# Patient Record
Sex: FEMALE | Race: WHITE
[De-identification: names, ages, dates, MRNs, and addresses within clinical notes are randomized per-mention and may not be internally consistent; named-entity substitution may affect disease eponyms.]

---

## 2017-10-22 ENCOUNTER — HEALTH MAINTENANCE LETTER (OUTPATIENT)
Age: 47
End: 2017-10-22

## 2018-07-16 ENCOUNTER — TRANSFERRED RECORDS (OUTPATIENT)
Dept: HEALTH INFORMATION MANAGEMENT | Facility: CLINIC | Age: 48
End: 2018-07-16

## 2018-07-26 ENCOUNTER — TRANSFERRED RECORDS (OUTPATIENT)
Dept: HEALTH INFORMATION MANAGEMENT | Facility: CLINIC | Age: 48
End: 2018-07-26

## 2018-08-29 ENCOUNTER — OFFICE VISIT (OUTPATIENT)
Dept: FAMILY MEDICINE | Facility: CLINIC | Age: 48
End: 2018-08-29
Payer: MEDICAID

## 2018-08-29 VITALS
OXYGEN SATURATION: 100 % | HEART RATE: 96 BPM | DIASTOLIC BLOOD PRESSURE: 76 MMHG | BODY MASS INDEX: 45.48 KG/M2 | SYSTOLIC BLOOD PRESSURE: 119 MMHG | WEIGHT: 281.8 LBS | TEMPERATURE: 99.1 F

## 2018-08-29 DIAGNOSIS — I10 BENIGN ESSENTIAL HYPERTENSION: ICD-10-CM

## 2018-08-29 DIAGNOSIS — F41.1 GAD (GENERALIZED ANXIETY DISORDER): ICD-10-CM

## 2018-08-29 DIAGNOSIS — F41.0 PANIC DISORDER WITHOUT AGORAPHOBIA: ICD-10-CM

## 2018-08-29 DIAGNOSIS — F15.21 METHAMPHETAMINE USE DISORDER, SEVERE, IN EARLY REMISSION, IN CONTROLLED ENVIRONMENT, DEPENDENCE (H): Primary | ICD-10-CM

## 2018-08-29 DIAGNOSIS — R30.0 DYSURIA: ICD-10-CM

## 2018-08-29 DIAGNOSIS — Z13.9 SCREENING FOR CONDITION: ICD-10-CM

## 2018-08-29 LAB
BACTERIA: NORMAL
BILIRUBIN UR: NEGATIVE
BLOOD UR: NEGATIVE
CASTS: NORMAL /LPF
CRYSTAL URINE: NORMAL /LPF
EPITHELIAL CELLS UR: NORMAL /LPF (ref 0–2)
GLUCOSE URINE: NEGATIVE
HCG UR QL: NEGATIVE
KETONES UR QL: NEGATIVE
LEUKOCYTE ESTERASE UR: ABNORMAL
MUCOUS URINE: NORMAL LPF
NITRITE UR QL STRIP: POSITIVE
PH UR STRIP: 6 [PH] (ref 5–7)
PROTEIN UR: NEGATIVE
RBC URINE: NORMAL /HPF
SP GR UR STRIP: 1.02
UROBILINOGEN UR STRIP-ACNC: ABNORMAL
WBC URINE: NORMAL /HPF

## 2018-08-29 RX ORDER — LISINOPRIL 10 MG/1
10 TABLET ORAL DAILY
Qty: 30 TABLET | Refills: 99 | Status: SHIPPED | OUTPATIENT
Start: 2018-08-29 | End: 2018-08-29

## 2018-08-29 RX ORDER — ACETAMINOPHEN 500 MG
500 TABLET ORAL
COMMUNITY
Start: 2018-07-16 | End: 2018-10-18

## 2018-08-29 RX ORDER — LISINOPRIL 10 MG/1
TABLET ORAL
Refills: 99 | COMMUNITY
Start: 2018-07-09 | End: 2018-08-29

## 2018-08-29 RX ORDER — LISINOPRIL 2.5 MG/1
2.5 TABLET ORAL DAILY
Qty: 30 TABLET | Refills: 3 | Status: SHIPPED | OUTPATIENT
Start: 2018-08-29 | End: 2018-10-18

## 2018-08-29 RX ORDER — HYDROXYZINE PAMOATE 50 MG/1
CAPSULE ORAL
Refills: 99 | COMMUNITY
Start: 2018-07-09 | End: 2018-10-18

## 2018-08-29 RX ORDER — LANCETS 28 GAUGE
EACH MISCELLANEOUS
Refills: 99 | COMMUNITY
Start: 2018-06-27 | End: 2018-10-18

## 2018-08-29 RX ORDER — ALBUTEROL SULFATE 90 UG/1
2 AEROSOL, METERED RESPIRATORY (INHALATION)
COMMUNITY
End: 2018-10-18

## 2018-08-29 RX ORDER — PROPRANOLOL HYDROCHLORIDE 10 MG/1
10 TABLET ORAL 3 TIMES DAILY
Qty: 90 TABLET | Refills: 1 | Status: SHIPPED | OUTPATIENT
Start: 2018-08-29 | End: 2018-10-18

## 2018-08-29 RX ORDER — NYSTATIN 100000 U/G
OINTMENT TOPICAL 2 TIMES DAILY
COMMUNITY

## 2018-08-29 RX ORDER — PRENATAL VIT/IRON FUM/FOLIC AC 27MG-0.8MG
1 TABLET ORAL DAILY
Qty: 100 TABLET | Refills: 3 | Status: SHIPPED | OUTPATIENT
Start: 2018-08-29 | End: 2018-10-18

## 2018-08-29 RX ORDER — IBUPROFEN 400 MG/1
400 TABLET, FILM COATED ORAL
COMMUNITY
Start: 2015-03-23 | End: 2018-10-18

## 2018-08-29 RX ORDER — MINERAL OIL/HYDROPHIL PETROLAT
OINTMENT (GRAM) TOPICAL
Refills: 0 | COMMUNITY
Start: 2018-05-22 | End: 2018-10-18

## 2018-08-29 RX ORDER — OMEGA-3 FATTY ACIDS/FISH OIL 300-1000MG
1 CAPSULE ORAL DAILY
Qty: 90 CAPSULE | Refills: 3 | Status: SHIPPED | OUTPATIENT
Start: 2018-08-29 | End: 2018-09-28

## 2018-08-29 RX ORDER — BENZOCAINE/MENTHOL 6 MG-10 MG
LOZENGE MUCOUS MEMBRANE
COMMUNITY
Start: 2016-09-27 | End: 2018-10-18

## 2018-08-29 NOTE — MR AVS SNAPSHOT
After Visit Summary   8/29/2018    Misty Girard    MRN: [id number]           Patient Information     Date Of Birth          1970        Visit Information        Provider Department      8/29/2018 4:00 PM [facility]        Today's Diagnoses     Screening for condition    -  1    Methamphetamine use disorder, severe, in early remission, in controlled environment, dependence (H)        ALONA (generalized anxiety disorder)        Panic disorder without agoraphobia        Benign essential hypertension        Dysuria          Care Instructions    Here is the plan from today's visit    1. Screening for condition  - HIV Antigen Antibody Combo  - Hepatitis A Antibody IgG  - Hepatitis A antibody IgM  - Hepatitis B Surface Antibody  - Hepatitis B surface antigen  - Hepatitis C antibody  - M Tuberculosis by Quantiferon  - HCG Qualitative Urine ([facility])  - Urine Microscopic (UA) ([facility])    2. Methamphetamine use disorder, severe, in early remission, in controlled environment, dependence (H)  - BEHAVIORAL HEALTH REFERRAL ([facility] interal and external)    3. ALONA (generalized anxiety disorder)  - propranolol (INDERAL) 10 MG tablet; Take 1 tablet (10 mg) by mouth 3 times daily AS NEEDED for anxiety  Dispense: 90 tablet; Refill: 1  - BEHAVIORAL HEALTH REFERRAL ([facility] interal and external)    4. Panic disorder without agoraphobia  - propranolol (INDERAL) 10 MG tablet; Take 1 tablet (10 mg) by mouth 3 times daily AS NEEDED for anxiety  Dispense: 90 tablet; Refill: 1  - BEHAVIORAL HEALTH REFERRAL ([facility] interal and external)    5. Benign essential hypertension  - propranolol (INDERAL) 10 MG tablet; Take 1 tablet (10 mg) by mouth 3 times daily AS NEEDED for anxiety  Dispense: 90 tablet; Refill: 1  - lisinopril (PRINIVIL/ZESTRIL) 2.5 MG tablet; Take 1 tablet (2.5 mg) by mouth daily  Dispense: 30 tablet; Refill: 3  - AUTOMATIC BP MONITOR, DIAL  - omega 3 1000 MG  CAPS; Take 1 g by mouth daily  Dispense: 90 capsule; Refill: 3  - Prenatal Vit-Fe Fumarate-FA (PRENATAL MULTIVITAMIN PLUS IRON) 27-0.8 MG TABS per tablet; Take 1 tablet by mouth daily  Dispense: 100 tablet; Refill: 3    6. Dysuria  - Urinalysis, Micro If (UA) ([facility])  - Urine Microscopic (UA) ([facility])    Please call or return to clinic if your symptoms don't go away.    Follow up plan  Follow up within one week for psych/mental health management. Follow up in the next 2 weeks for gyn concerns. Follow up later for osteoarthritis of knees.    Thank you for coming to [facility] today.  Lab Testing:  **If you had lab testing today and your results are reassuring or normal they will be mailed to you or sent through [employer] within 7 days.   **If the lab tests need quick action we will call you with the results.  The phone number we will call with results is # [phone] (home) . If this is not the best number please call our clinic and change the number.  Medication Refills:  If you need any refills please call your pharmacy and they will contact us.   If you need to  your refill at a new pharmacy, please contact the new pharmacy directly. The new pharmacy will help you get your medications transferred faster.   Scheduling:  If you have any concerns about today's visit or wish to schedule another appointment please call our office during normal business hours [phone] (8-5:00 M-F)  If a referral was made to a [facility] and you don't get a call from central scheduling please call [phone].  If a Mammogram was ordered for you at [facility] call [phone] to schedule or change your appointment.  If you had an XRay/CT/Ultrasound/MRI ordered the number is [phone] to schedule or change your radiology appointment.   Medical Concerns:  If you have urgent medical concerns please call [phone] at any time of the day.    David [facility], DO            Follow-ups  after your visit        Additional Services     BEHAVIORAL HEALTH REFERRAL ([facility] inter[facility] and external)       Referring MD: AGUEDA ARREGUIN    May leave message on voicemail: No  PHQ-9 Score:   GAD7 Score:                  Who to contact     Please call your clinic at [phone] to:    Ask questions about your health    Make or cancel appointments    Discuss your medicines    Learn about your test results    Speak to your doctor            Additional Information About Your Visit        Care EveryWhere ID     This is your Care EveryWhere ID. This could be used by other organizations to access your [facility] medical records  [id number]        Your Vitals Were     Pulse Temperature Pulse Oximetry BMI (Body Mass Index)          96 99.1  F (37.3  C) (Oral) 100% 45.48 kg/m2         Blood Pressure from Last 3 Encounters:   08/29/18 119/76   02/07/12 112/80   02/22/10 124/78    Weight from Last 3 Encounters:   08/29/18 281 lb 12.8 oz (127.8 kg)   02/07/12 262 lb 1.6 oz (118.9 kg)   02/22/10 263 lb (119.3 kg)              We Performed the Following     AUTOMATIC BP MONITOR, DIAL     BEHAVIORAL HEALTH REFERRAL ([facility] and external)     HCG Qualitative Urine ([facility])     Hepatitis A Antibody IgG     Hepatitis A antibody IgM     Hepatitis B Surface Antibody     Hepatitis B surface antigen     Hepatitis C antibody     HIV Antigen Antibody Combo     M Tuberculosis by Quantiferon     Urinalysis, Micro If (UA) ([facility])     Urine Microscopic (UA) ([facility])          Today's Medication Changes          These changes are accurate as of 8/29/18  5:18 PM.  If you have any questions, ask your nurse or doctor.               Start taking these medicines.        Dose/Directions    lisinopril 2.5 MG tablet   Commonly known as:  PRINIVIL/Zestril   Used for:  Benign essential hypertension   Started by:  Agueda Arreguin        Dose:  2.5 mg   Take 1 tablet (2.5 mg) by mouth daily   Quantity:  30 tablet    Refills:  3       omega 3 1000 MG Caps   Used for:  Benign essential hypertension   Started by:  Ag[facility]a Vera        Dose:  1 g   Take 1 g by mouth daily   Quantity:  90 capsule   Refills:  3       prenatal multivitamin plus iron 27-0.8 MG Tabs per tablet   Used for:  Benign essential hypertension   Started by:  Agueda Vera        Dose:  1 tablet   Take 1 tablet by mouth daily   Quantity:  100 tablet   Refills:  3         These medicines have changed or have updated prescriptions.        Dose/Directions    * propranolol 10 MG tablet   Commonly known as:  INDERAL   This may have changed:  Another medication with the same name was added. Make sure you understand how and when to take each.   Used for:  Generalised anxiety disorder   Changed by:  Agueda Vera        1/2 tab up to 4 times daily   Quantity:  60   Refills:  5       * propranolol 10 MG tablet   Commonly known as:  INDERAL   This may have changed:  You were already taking a medication with the same name, and this prescription was added. Make sure you understand how and when to take each.   Used for:  ALONA (generalized anxiety disorder), Panic disorder without agoraphobia, Benign essential hypertension   Changed by:  Agueda Vera        Dose:  10 mg   Take 1 tablet (10 mg) by mouth 3 times daily AS NEEDED for anxiety   Quantity:  90 tablet   Refills:  1       * Notice:  This list has 2 medication(s) that are the same as other medications prescribed for you. Read the directions carefully, and ask your doctor or other care provider to review them with you.      Stop taking these medicines if you haven't already. Please contact your care team if you have questions.     ORTHO-CYCLEN (28) 0.25-35 MG-MCG per tablet   Generic drug:  norgestimate-ethinyl estradiol   Stopped by:  Agueda Vera                Where to get your medicines      These medications were sent to [facility] [place] [facility] - [place], [place] - [address]  [address], [place] [address]     Phone:  [phone]     lisinopril 2.5 MG tablet    omega 3 1000 MG Caps    prenatal multivitamin plus iron 27-0.8 MG Tabs per tablet    propranolol 10 MG tablet                Primary Care Provider Fax #    Physician No Ref-Primary [phone]       No address on file        Equal Access to Services     MARAH MONTOYA : Hadii aad ku hadasho Soomaali, waaxda luqadaha, qaybta kaalmada adeegyada, waxay idiin hayaan adebri conner laKarengilma . So [facility] [phone].    ATENCIÓN: Si habla esptung, tiene a barbour disposición servicios gratuitos de asistencia lingüística. Jolene al [phone].    We comply with applicable federal civil rights laws and [place] laws. We do not discriminate on the basis of race, color, national origin, age, disability, sex, sexual orientation, or gender identity.            Thank you!     Thank you for choosing [facility]  for your care. Our goal is always to provide you with excellent care. Hearing back from our patients is one way we can continue to improve our services. Please take a few minutes to complete the written survey that you may receive in the mail after your visit with us. Thank you!             Your Updated Medication List - Protect others around you: Learn how to safely use, store and throw away your medicines at www.disposemymeds.org.          This list is accurate as of 8/29/18  5:18 PM.  Always use your most recent med list.                   Brand Name Dispense Instructions for use Diagnosis    acetaminophen 500 MG tablet    TYLENOL     Take 500 mg by mouth        albuterol 108 (90 Base) MCG/ACT inhaler    PROAIR HFA/PROVENTIL HFA/VENTOLIN HFA     Inhale 2 puffs into the lungs        aspirin 81 MG tablet     100    1 TABLET DAILY    Hypothyroidism       Blood Pressure Monitor Misc      As directed. Dx 401.9        BUSPAR 5 MG tablet   Generic drug:  busPIRone     60 Tab    1  tab twice daily    Generalised anxiety disorder       CONTOUR NEXT TEST test strip   Generic drug:  blood glucose monitoring      USE TO TEST BLOOD GLUCOSE FOUR TIMES DAILY;USE TO TEST BLOOD GLUCOSE AS NEEDED        DOCOSAHEXAENOIC ACID PO      Take 1,000 mg by mouth        hydrOXYzine 50 MG capsule    VISTARIL     TAKE 1 CAP BY MOUTH TWICE A DAY        IRON PO           levonorgestrel 20 MCG/24HR IUD    MIRENA     1 Device by Intrauterine route        lisinopril 2.5 MG tablet    PRINIVIL/Zestril    30 tablet    Take 1 tablet (2.5 mg) by mouth daily    Benign essential hypertension       metFORMIN 500 MG tablet    GLUCOPHAGE     TAKE 2 TABS (1,000MG) BY MOUTH TWICE A DAY WITH MEALS        mineral oil-hydrophilic petrolatum      APPLY 1 4 MED CUP TO AFFECT ED AREA(S) DAILY AS NEEDED FOR 14 DAYS        * MOTRIN 800 MG tablet   Generic drug:  ibuprofen     90 Tab    ONE TABLET 3 TIMES A DAY WITH FOOD    PMS (premenstrual syndrome)       * ibuprofen 400 MG tablet    ADVIL/MOTRIN     Take 400 mg by mouth        nystatin ointment    MYCOSTATIN     Apply topically 2 times daily        omega 3 1000 MG Caps     90 capsule    Take 1 g by mouth daily    Benign essential hypertension       prenatal multivitamin plus iron 27-0.8 MG Tabs per tablet     100 tablet    Take 1 tablet by mouth daily    Benign essential hypertension       PRENATAL VITAMIN TABS   OR     100    1 TABLET DAILY    Fatigue       * propranolol 10 MG tablet    INDERAL    60    1/2 tab up to 4 times daily    Generalised anxiety disorder       * propranolol 10 MG tablet    INDERAL    90 tablet    Take 1 tablet (10 mg) by mouth 3 times daily AS NEEDED for anxiety    ALONA (generalized anxiety disorder), Panic disorder without agoraphobia, Benign essential hypertension       PSYLLIUM PO      Take 1 tsp. by mouth        SYNTHROID 50 MCG tablet   Generic drug:  levothyroxine     90    1 TABLET DAILY    Hypothyroidism       UNISTIK 3 COMFORT Misc      USE TO TEST  BLOOD GLUCOSE FOUR TIMES DAILY;USE TO TEST BLOOD GLUCOSE AS NEEDED        VITAMIN D (CHOLECALCIFEROL) PO      Take 2,000 Units by mouth daily        * Notice:  This list has 4 medication(s) that are the same as other medications prescribed for you. Read the directions carefully, and ask your doctor or other care provider to review them with you.

## 2018-08-29 NOTE — PROGRESS NOTES
New Patient Note-Women         HPI       She is a [place] [facility] patient here to establish care. She was recently incarcerated for several months related to her long-term meth use.     Concerns today: multiple concerns, would like to transfer primary care to our clinic.   1) She has severe anxiety and would like genetic testing to determine which psych meds are right for her. Has had many panic attacks, and would like a referral to our behavioral health team.  2) needs refills on lisinopril for BP and propranolol for anxiety and HTN  3) She has concerns if she should have her Mirena IUD (5/23/17) in place if she is already menopausal.      Patient Active Problem List   Diagnosis     Generalized anxiety disorder     Secondary dysmenorrhea     Fatigue     Hypothyroidism     Elevated BP     CARDIOVASCULAR SCREENING; LDL GOAL LESS THAN 160       Past Medical History:   Diagnosis Date     Elevated BP 1/27/2010    Following on/off propranalol     Fatigue 1/27/2010    Chronic fatigue syndrome?     Generalised anxiety disorder 1/27/2010    Propranalol as needed  Starting citalopram 1/10     Hypothyroidism 1/27/2010    50 mcg - 1/10     Secondary dysmenorrhea 1/27/2010       Previous Medical Care   Established PCP with [facility]     Family History   Problem Relation Age of Onset     Diabetes Mother      C.A.D. Father      Hypertension Father               Review of Systems:     Review of Systems:  CONSTITUTIONAL: NEGATIVE for fever, chills, change in weight  INTEGUMENTARY/SKIN: NEGATIVE for worrisome rashes, moles or lesions  EYES: NEGATIVE for vision changes or irritation  ENT/MOUTH: NEGATIVE for ear, mouth and throat problems  RESP: NEGATIVE for significant cough or SOB  BREAST: NEGATIVE for masses, tenderness or discharge  CV: NEGATIVE for chest pain, palpitations or peripheral edema  GI: NEGATIVE for nausea, abdominal pain, heartburn, or change in bowel habits  : NEGATIVE for frequency, dysuria, or  hematuria  MUSCULOSKELETAL:arthralgias bilateral knees and joint swelling diagnosed bilateral Baker's cysts  NEURO: paresthesias bilateral feet in AM and with long standing  ENDOCRINE: NEGATIVE for temperature intolerance, skin/hair changes  HEME/ALLERGY: NEGATIVE for bleeding problems  PSYCHIATRIC: anxiety, HX anxiety and HX panic attacks  Sleep:   Do you snore most or the night (as reported by a family member)? Yes  Do you feel sleepy or extremely tired during most of the day? Yes  Diagnosed with SOPHIA- waiting for delivery of CPAP           Social History     Social History   Substance Use Topics     Smoking status: Never Smoker     Smokeless tobacco: Never Used     Alcohol use No       Marital Status:  Who lives in your household? Lives at [place] [facility]    Has anyone hurt you physically, for example by pushing, hitting, slapping or kicking you or forcing you to have sex? Denies  Do you feel threatened or controlled by a partner, ex-partner or anyone in your life? Denies    Sexual Health     Sexual concerns: No   STI History: Neg  Pregnancy History:   LMP No LMP recorded. Patient is not currently having periods (Reason: IUD).   Last Pap Smear Date: No results found for: PAP  Abnormal Pap History: None           Physical Exam:     Vitals: /76  Pulse 96  Temp 99.1  F (37.3  C) (Oral)  Wt 281 lb 12.8 oz (127.8 kg)  SpO2 100%  BMI 45.48 kg/m2  BMI= Body mass index is 45.48 kg/(m^2).     GENERAL: healthy, alert and obese  NECK: no tenderness, no adenopathy, no asymmetry, no masses, no stiffness; thyroid- normal to palpation  RESP: lungs clear to auscultation - no rales, no rhonchi, no wheezes  CV: regular rates and rhythm, normal S1 S2, no S3 or S4 and no murmur, no click or rub -  ABDOMEN: soft, no tenderness, no  hepatosplenomegaly, no masses, normal bowel sounds. Midline ventral hernia present, reducible, nontender.  MS: extremities- no gross deformities noted, no edema  SKIN: no  suspicious lesions, no rashes  NEURO: strength and tone- normal, sensory exam- grossly normal, mentation- intact, speech- normal, reflexes- symmetric  PSYCH: anxious mood and affect, rocking back and forth in her chair, speech pressured at times, redirectable    Assessment and Plan      Misty was seen today for physical.    Diagnoses and all orders for this visit:    Screening for condition  -     HIV Antigen Antibody Combo  -     Hepatitis A Antibody IgG  -     Hepatitis A antibody IgM  -     Hepatitis B Surface Antibody  -     Hepatitis B surface antigen  -     Hepatitis C antibody  -     M Tuberculosis by Quantiferon  -     HCG Qualitative Urine ([facility])  -     Urine Microscopic (UA) ([facility])    Methamphetamine use disorder, severe, in early remission, in controlled environment, dependence (H)  -     [facility]    ALONA (generalized anxiety disorder)  -     propranolol (INDERAL) 10 MG tablet; Take 1 tablet (10 mg) by mouth 3 times daily AS NEEDED for anxiety  -     [facility]    Panic disorder without agoraphobia  -     propranolol (INDERAL) 10 MG tablet; Take 1 tablet (10 mg) by mouth 3 times daily AS NEEDED for anxiety  -     BEHAVIORAL HEALTH REFERRAL ([facility] interal and external)    Benign essential hypertension  -     propranolol (INDERAL) 10 MG tablet; Take 1 tablet (10 mg) by mouth 3 times daily AS NEEDED for anxiety  -     Discontinue: lisinopril (PRINIVIL/ZESTRIL) 10 MG tablet; Take 1 tablet (10 mg) by mouth daily  -     lisinopril (PRINIVIL/ZESTRIL) 2.5 MG tablet; Take 1 tablet (2.5 mg) by mouth daily  -     AUTOMATIC BP MONITOR, DIAL  -     omega 3 1000 MG CAPS; Take 1 g by mouth daily  -     Prenatal Vit-Fe Fumarate-FA (PRENATAL MULTIVITAMIN PLUS IRON) 27-0.8 MG TABS per tablet; Take 1 tablet by mouth daily    Dysuria  -     Urinalysis, Micro If (UA) ([facility])  -     Urine Microscopic (UA)  ([facility])    Misty was advised to follow up at our clinic for her mental health concerns, and for her gynecologic concerns, and for her knee pain.    Options for treatment and follow-up care were reviewed with the patient . Misty Girard  engaged in the decision making process and verbalized understanding of the options discussed and agreed with the final plan.    David Vera, DO

## 2018-08-29 NOTE — PATIENT INSTRUCTIONS
Here is the plan from today's visit    1. Screening for condition  - HIV Antigen Antibody Combo  - Hepatitis A Antibody IgG  - Hepatitis A antibody IgM  - Hepatitis B Surface Antibody  - Hepatitis B surface antigen  - Hepatitis C antibody  - M Tuberculosis by Quantiferon  - HCG Qualitative Urine ([facility])  - Urine Microscopic (UA) ([facility])    2. Methamphetamine use disorder, severe, in early remission, in controlled environment, dependence (H)  - BEHAVIORAL HEALTH REFERRAL ([facility] interal and external)    3. ALONA (generalized anxiety disorder)  - propranolol (INDERAL) 10 MG tablet; Take 1 tablet (10 mg) by mouth 3 times daily AS NEEDED for anxiety  Dispense: 90 tablet; Refill: 1  - BEHAVIORAL HEALTH REFERRAL ([facility] interal and external)    4. Panic disorder without agoraphobia  - propranolol (INDERAL) 10 MG tablet; Take 1 tablet (10 mg) by mouth 3 times daily AS NEEDED for anxiety  Dispense: 90 tablet; Refill: 1  - BEHAVIORAL HEALTH REFERRAL ([facility] interal and external)    5. Benign essential hypertension  - propranolol (INDERAL) 10 MG tablet; Take 1 tablet (10 mg) by mouth 3 times daily AS NEEDED for anxiety  Dispense: 90 tablet; Refill: 1  - lisinopril (PRINIVIL/ZESTRIL) 2.5 MG tablet; Take 1 tablet (2.5 mg) by mouth daily  Dispense: 30 tablet; Refill: 3  - AUTOMATIC BP MONITOR, DIAL  - omega 3 1000 MG CAPS; Take 1 g by mouth daily  Dispense: 90 capsule; Refill: 3  - Prenatal Vit-Fe Fumarate-FA (PRENATAL MULTIVITAMIN PLUS IRON) 27-0.8 MG TABS per tablet; Take 1 tablet by mouth daily  Dispense: 100 tablet; Refill: 3    6. Dysuria  - Urinalysis, Micro If (UA) ([facility])  - Urine Microscopic (UA) ([facility])    Please call or return to clinic if your symptoms don't go away.    Follow up plan  Follow up within one week for psych/mental health management. Follow up in the next 2 weeks for gyn concerns. Follow up later for osteoarthritis of knees.    Thank you for coming to [facility] today.  Lab  Testing:  **If you had lab testing today and your results are reassuring or normal they will be mailed to you or sent through [employer] within 7 days.   **If the lab tests need quick action we will call you with the results.  The phone number we will call with results is # [phone] (home) . If this is not the best number please call our clinic and change the number.  Medication Refills:  If you need any refills please call your pharmacy and they will contact us.   If you need to  your refill at a new pharmacy, please contact the new pharmacy directly. The new pharmacy will help you get your medications transferred faster.   Scheduling:  If you have any concerns about today's visit or wish to schedule another appointment please call our office during normal business hours [phone] (8-5:00 M-F)  If a referral was made to a [facility] and you don't get a call from central scheduling please call [phone].  If a Mammogram was ordered for you at [facility] call [phone] to schedule or change your appointment.  If you had an XRay/CT/Ultrasound/MRI ordered the number is [phone] to schedule or change your radiology appointment.   Medical Concerns:  If you have urgent medical concerns please call [phone] at any time of the day.    David Vera,

## 2018-08-29 NOTE — PROGRESS NOTES
Preceptor Attestation:   Patient seen, evaluated and discussed with the resident.   I have verified the content of the note, which accurately reflects my assessment of the patient and the plan of care.   Supervising Physician:  Jaylen Shahid MD

## 2018-08-30 ENCOUNTER — TELEPHONE (OUTPATIENT)
Dept: FAMILY MEDICINE | Facility: CLINIC | Age: 48
End: 2018-08-30

## 2018-08-30 LAB
HAV IGG SER QL IA: NONREACTIVE
HAV IGM SERPL QL IA: NONREACTIVE
HBV SURFACE AB SERPL IA-ACNC: 0.97 M[IU]/ML
HBV SURFACE AG SERPL QL IA: NONREACTIVE
HCV AB SERPL QL IA: NONREACTIVE
HIV 1+2 AB+HIV1 P24 AG SERPL QL IA: NONREACTIVE

## 2018-08-30 NOTE — TELEPHONE ENCOUNTER
[facility] phone call message- medication clarification/question:    Full Medication Name: lisinopril (PRINIVIL/ZESTRIL) 2.5 MG tablet   Dose: Take 1 tablet (2.5 mg) by mouth daily - Oral    Question/Clarification needed: Patient's pharmacy called stating that they received two different scripts for this medication. Author did inform pharmacy that this is the only prescription for this specific medication on file. Pharmacy would like call back to clarify.     Pharmacy confirmed as   [facility] [place] [facility] - [place], [place] - [address]  [address]  [place] [address]  Phone: [phone] Fax: [phone]  : Yes    Please leave ONLY preferred pharmacy    OK to leave a message on voice mail? Yes    Advised patient that RN would call back within 3 hours, unless emergent.    Primary language: English      needed? No    Call taken on August 30, 2018 at 9:38 AM by Ailyn Hamilton    Route to [facility]

## 2018-08-30 NOTE — TELEPHONE ENCOUNTER
RN called pharmacy to relay that lisinopril 10mg dose was discontinued and ordered the 2.5mg dose. pharmacy verbalized understanding    Jesica Byers RN

## 2018-08-30 NOTE — TELEPHONE ENCOUNTER
Dr. Shahid per [facility] the OMEGA 3 is not covered by insurance, please send over something else or let us know if you would like a PA started?    Prior Authorization Retail Medication Request    Medication/Dose: OMEGA-3-ACID 1GM CAP  ICD code (if different than what is on RX):  Benign essential hypertension [I10]   Previously Tried and Failed:  See chart  Rationale:  See chart    Insurance Name:  [employer]  Insurance ID:  [id number]      Pharmacy Information (if different than what is on RX)  Name:  [facility]  Phone:  [phone]    ARLET Burnett 4:05 PM August 30, 2018

## 2018-08-31 ENCOUNTER — TELEPHONE (OUTPATIENT)
Dept: FAMILY MEDICINE | Facility: CLINIC | Age: 48
End: 2018-08-31

## 2018-08-31 LAB
GAMMA INTERFERON BACKGROUND BLD IA-ACNC: 0.02 IU/ML
M TB IFN-G BLD-IMP: NEGATIVE
M TB IFN-G CD4+ BCKGRND COR BLD-ACNC: 8.57 IU/ML
MITOGEN IGNF BCKGRD COR BLD-ACNC: 0.01 IU/ML
MITOGEN IGNF BCKGRD COR BLD-ACNC: 0.02 IU/ML

## 2018-08-31 NOTE — TELEPHONE ENCOUNTER
RN returned call. States she figured it out. Confirmed directions one tablet three times daily PRN. No further questions at this time.    Mary Holt RN

## 2018-08-31 NOTE — TELEPHONE ENCOUNTER
[facility] phone call message- medication clarification/question:    Full Medication Name: propranolol (INDERAL) 10 MG tablet       Question/Clarification needed: Karmen from [facility] called to get clarification on the medication above. She states she got two different way of medication prescription and need some clarification. Please give her call to discuss.      Pharmacy confirmed as   [facility] [place] [facility] - [place], [place] - [address]  [address]  [place] [address]  Phone: [phone] Fax: [phone]  : Yes    Please leave ONLY preferred pharmacy    OK to leave a message on voice mail? Yes    Advised patient that RN would call back within 3 hours, unless emergent.    Primary language: English      needed? No    Call taken on August 31, 2018 at 1:30 PM by Roseline Randolph    Route to [facility] TRIAGE

## 2018-08-31 NOTE — TELEPHONE ENCOUNTER
Private insurance will likely not cover fish oil, but as a [place] [facility] patient, she will need an Rx for all meds. Please check with faculty about how to proceed.  David Vera, DO

## 2018-09-05 ENCOUNTER — TELEPHONE (OUTPATIENT)
Dept: FAMILY MEDICINE | Facility: CLINIC | Age: 48
End: 2018-09-05

## 2018-09-05 NOTE — TELEPHONE ENCOUNTER
Left message for Norman with [facility], stated that we are calling in regards to the fish oil script for patient as it looks like the insurance will not cover it with a script, need to know how to go about it to get her the fish oil while she is in [facility].     Cheri Farias, TIFFA 1:42 PM September 5, 2018

## 2018-09-05 NOTE — TELEPHONE ENCOUNTER
Sent message to Dr. Vera for clarification.  Will update when I receive a reply.    Hi Dr. Vera - Just needed some clarification - the referral looks like it is for therapy but in your note it states the patient wants genetic testing and input on her medications, so that seems like that would be a psychiatry referral.  The other piece I am wondering about is if she is still an [facility] patient.  If this is the case, then her referrals for psychiatry and  are managed through the [facility] program.  Your note stated she was switching her primary care here, so I wasn't sure if she was finished with the program and seeing us here on her own now or what the situation is currently?    Thank you!  Kaur

## 2018-09-06 ENCOUNTER — TELEPHONE (OUTPATIENT)
Dept: FAMILY MEDICINE | Facility: CLINIC | Age: 48
End: 2018-09-06

## 2018-09-06 NOTE — TELEPHONE ENCOUNTER
[facility] phone call message- general phone call:    Reason for call: Nurse from [place] [facility] called back to inform MA that Pharmacy has filled the prescription and patient will pay cash if she chooses to take.    Return call needed: No    OK to leave a message on voice mail? No    Advised patient response may take up to 2 business days: No    Primary language: English      needed? No    Call taken on September 6, 2018 at 11:41 AM by Kary Cesar to [facility] TRIAGE

## 2018-09-07 ENCOUNTER — TELEPHONE (OUTPATIENT)
Dept: FAMILY MEDICINE | Facility: CLINIC | Age: 48
End: 2018-09-07

## 2018-09-08 NOTE — TELEPHONE ENCOUNTER
Psychiatry Consult Referral:  Please schedule this patient with Dr. Blanton.  This is for a one time consult only, not for ongoing psychiatric care.  Dr. Blanton provides recommendations to the PCP for ongoing care.     Please let the patient know that this is an educational consult clinic.  For that reason, Dr. Blanton and a resident will be seeing the patient together.  If the patient is not comfortable with that we can assist with making arrangements for a psychiatry consult at an outside clinic.    If you are unable to reach the patient after two phone calls, please send a letter and close this encounter.    Thank you!

## 2018-09-11 ENCOUNTER — TELEPHONE (OUTPATIENT)
Dept: FAMILY MEDICINE | Facility: CLINIC | Age: 48
End: 2018-09-11

## 2018-09-11 DIAGNOSIS — M17.0 PRIMARY OSTEOARTHRITIS OF BOTH KNEES: Primary | ICD-10-CM

## 2018-09-11 NOTE — TELEPHONE ENCOUNTER
Central Prior Authorization Team   Phone: [phone]      PA Initiation    Medication[facility]- [facility]  Insurance Company: [place] Medicaid ([facility]) - Phone [phone] Fax [phone]  Pharmacy Filling the Rx: [facility] [place] [facility] - [place], [place] - [address] [place] [address], [address]  Filling Pharmacy Phone: [phone]  Filling Pharmacy Fax: [phone]  Start Date: 9/11/2018

## 2018-09-12 NOTE — TELEPHONE ENCOUNTER
PRIOR AUTHORIZATION DENIED    Medication: Fish Oil- DENIED    Denial Date: 9/11/2018    Denial Rational:           Appeal Information:

## 2018-09-13 ENCOUNTER — TELEPHONE (OUTPATIENT)
Dept: FAMILY MEDICINE | Facility: CLINIC | Age: 48
End: 2018-09-13

## 2018-09-13 NOTE — TELEPHONE ENCOUNTER
Prior Authorization Retail Medication Request  Medication/Dose: Fiberlax Tabs 625mg 1x a day PRN           [facility] [place] [address] - [place] - [address], [address] [phone] (Phone)  [phone] (Fax)     Patient indicates to reference NDC [id number] when submitting to insurance and then they will cover it she takes the medication 1x a day PRN.

## 2018-09-13 NOTE — TELEPHONE ENCOUNTER
[facility] phone call message - order or referral request from patient:     Order or referral being requested: Requested order for bilateral knee sleeve supports  Additional Details: please send to [facility] medical    Referral only -Specialty knee supports location [facility] fax # is [phone]    OK to leave a message on voice mail? Yes    Primary language: English      needed? No    Call taken on September 13, 2018 at 4:43 PM by Verona Vargas    Order request route to [facility] TRIAGE   Referrals Route to [facility] ([facility]) CARE COORDINATOR

## 2018-09-13 NOTE — TELEPHONE ENCOUNTER
Prior Authorization:     Denied Reason: see below    Alternatives: None available    Pharmacy notified.  Routing to MD    Please advise if you would like to move forward with the appeal process or plan to  prescribe an alternative medication. If Appeal is desired a letter of medical necessity with denial rationale is needed to start the appeal process.   Route to PA Pool when completed.    ARLET Burnett 8:35 AM September 13, 2018

## 2018-09-13 NOTE — TELEPHONE ENCOUNTER
LVM with name and callback number on [place] [facility] VM for Luiz.       Comfort Sneed, CMA

## 2018-09-13 NOTE — TELEPHONE ENCOUNTER
Spoke with Norman at [facility] who transferred me as pt is now in a different program, left voicemail for the nurse Mikaela to give us a call back. Need to verify message below.     ARLET Burnett 8:14 AM September 13, 2018

## 2018-09-13 NOTE — TELEPHONE ENCOUNTER
The patient called back about this PA.  The PA needs to state specific medication of Omega 3 fatty acid fish oil 340/1000mg she takes this 2x a day and NDC # to reference per pt is [id number]

## 2018-09-14 NOTE — TELEPHONE ENCOUNTER
Called pharmacy to have them reprocess with NDC below and they stated that is the one they were requesting PA for. Call [place] Medicaid to find out why medication was denied if NDC should be covered. Per rep, letter states patient must be seen at the 3 locations described below in order for Fish Oil to be covered. Appeal will need to be done at this point if MD feels medication should be covered regardless of location.

## 2018-09-14 NOTE — TELEPHONE ENCOUNTER
Message routed to Dr. Vera to place order if appropriate. Saw patient in clinic on 8/29/18.     If placed, please give to your forms PCS to fax.    Mary Holt RN

## 2018-09-14 NOTE — TELEPHONE ENCOUNTER
Spoke with Mikaela nursing staff at facility pt is at and verified that yes the pt needs the prescription sent in if it isn't already, advised we sent it over but got it back saying insurance doesn't cover it. At this point Mikaela will contact the pharmacy and advise them to give her the out of pocket price for pt and inform patient of it, at which pt can decide if she will pay for it or not and start it. Mikaela will call if any further questions.   ARLET Burnett 9:19 AM September 14, 2018

## 2018-09-17 NOTE — TELEPHONE ENCOUNTER
Central Prior Authorization Team   Phone: [phone]      PA Initiation    Medication: omega 3 1000 MG CAPS-PA initiated  Insurance Company: [place] Medicaid ([facility]) - Phone [phone] Fax [phone]  Pharmacy Filling the Rx: [facility] [place] [address] - [place], [place] - [address] [place] [address], [address]  Filling Pharmacy Phone: [phone]  Filling Pharmacy Fax: [phone]  Start Date: 9/17/2018

## 2018-09-18 NOTE — TELEPHONE ENCOUNTER
Patient called stating that the insurance company would like us to call to update information so the prior authorization could be processed. Please call MA at [phone].    Ailyn Hamilton, Patient Representative

## 2018-09-18 NOTE — TELEPHONE ENCOUNTER
Spoke to Janet at [place] [facility] and relayed information giving to me regarding PA. I called the phone # provided and kept getting back the the [facility] PA Dept at which they stated, there is nothing to update. The only thing left to do is an appeal. Janet will let patient know and discuss with clinic if any questions.

## 2018-09-20 NOTE — TELEPHONE ENCOUNTER
Prior Authorization:     Denied Reason:     Alternatives: None available    Pharmacy notified.  Routing to MD    Please advise if you would like to move forward with the appeal process or plan to  prescribe an alternative medication. If Appeal is desired a letter of medical necessity with denial rationale is needed to start the appeal process.   Route to PA Pool when completed.

## 2018-09-20 NOTE — TELEPHONE ENCOUNTER
Have placed order to [facility] for 2 elastic knee braces. Will consult with preceptor for how to proceed with PA for fish oil.  David Vera, DO

## 2018-09-21 ENCOUNTER — OFFICE VISIT (OUTPATIENT)
Dept: FAMILY MEDICINE | Facility: CLINIC | Age: 48
End: 2018-09-21
Payer: MEDICAID

## 2018-09-21 VITALS
OXYGEN SATURATION: 97 % | BODY MASS INDEX: 45 KG/M2 | WEIGHT: 278.8 LBS | SYSTOLIC BLOOD PRESSURE: 124 MMHG | DIASTOLIC BLOOD PRESSURE: 81 MMHG | HEART RATE: 94 BPM | RESPIRATION RATE: 16 BRPM

## 2018-09-21 DIAGNOSIS — I10 BENIGN ESSENTIAL HYPERTENSION: ICD-10-CM

## 2018-09-21 DIAGNOSIS — M17.0 OSTEOARTHRITIS OF BOTH KNEES, UNSPECIFIED OSTEOARTHRITIS TYPE: ICD-10-CM

## 2018-09-21 DIAGNOSIS — K59.00 CONSTIPATION, UNSPECIFIED CONSTIPATION TYPE: ICD-10-CM

## 2018-09-21 DIAGNOSIS — F15.10 METHAMPHETAMINE USE (H): ICD-10-CM

## 2018-09-21 DIAGNOSIS — Z00.00 ROUTINE GENERAL MEDICAL EXAMINATION AT A HEALTH CARE FACILITY: Primary | ICD-10-CM

## 2018-09-21 DIAGNOSIS — E11.40 TYPE 2 DIABETES MELLITUS WITH DIABETIC NEUROPATHY, WITHOUT LONG-TERM CURRENT USE OF INSULIN (H): ICD-10-CM

## 2018-09-21 DIAGNOSIS — F41.9 ANXIETY: ICD-10-CM

## 2018-09-21 LAB
CHOLEST SERPL-MCNC: 202.2 MG/DL (ref 0–200)
CHOLEST/HDLC SERPL: 6.1 {RATIO} (ref 0–5)
HBA1C MFR BLD: 6.7 % (ref 4.1–5.7)
HDLC SERPL-MCNC: 33.4 MG/DL
LDLC SERPL CALC-MCNC: 119 MG/DL (ref 0–129)
TRIGL SERPL-MCNC: 250.9 MG/DL (ref 0–150)
TSH SERPL DL<=0.005 MIU/L-ACNC: 2.47 MU/L (ref 0.4–4)
VLDL CHOLESTEROL: 50.2 MG/DL (ref 7–32)

## 2018-09-21 RX ORDER — CALCIUM POLYCARBOPHIL 625 MG 625 MG/1
2 TABLET ORAL DAILY
Qty: 30 TABLET | Refills: 3 | Status: SHIPPED | OUTPATIENT
Start: 2018-09-21 | End: 2018-10-18

## 2018-09-21 ASSESSMENT — ENCOUNTER SYMPTOMS
EYES NEGATIVE: 1
APPETITE CHANGE: 0
LIGHT-HEADEDNESS: 0
DYSPHORIC MOOD: 0
DIFFICULTY URINATING: 0
AGITATION: 0
PALPITATIONS: 0
WEAKNESS: 0
ACTIVITY CHANGE: 0
NUMBNESS: 1
CONSTIPATION: 0
ABDOMINAL DISTENTION: 0
HEADACHES: 0
COUGH: 0
ARTHRALGIAS: 1
DYSURIA: 0
DIARRHEA: 0
FATIGUE: 0
SLEEP DISTURBANCE: 0
FEVER: 0
UNEXPECTED WEIGHT CHANGE: 0
ABDOMINAL PAIN: 0
SHORTNESS OF BREATH: 0
NERVOUS/ANXIOUS: 1

## 2018-09-21 NOTE — PROGRESS NOTES
Preceptor Attestation:   Patient seen and discussed with the resident. Assessment and plan reviewed with resident and agreed upon.   Supervising Physician:  Deshaun Martin MD  [place]

## 2018-09-21 NOTE — LETTER
"[facility]  [address],  [address]  [facility] [address]  [phone]        October 6, 2018     Misty Girard  [address] [place] [id number]        Dear Misty:    Here are your results from your last hemoglobin A1c: 6.7, this is improved from your last A1c which you reported was 8. Continue your current diabetic regimen.     Here are the results of your latest lipid tests:  LDL Cholesterol Calculated   Date Value Ref Range Status   09/21/2018 119 0 - 129 mg/dL Final     HDL Cholesterol   Date Value Ref Range Status   09/21/2018 33.4 (L) >40.0 mg/dL Final     Triglycerides   Date Value Ref Range Status   09/21/2018 250.9 (H) 0.0 - 150.0 mg/dL Final     Cholesterol   Date Value Ref Range Status   09/21/2018 202.2 (H) 0.0 - 200.0 mg/dL Final       LDL is the \"bad\" cholesterol linked to heart disease and stroke.   HDL is the \"good\" choesterol and when it is high, it decreases the risk for above problems.    Follow a low-fat, low-cholesterol diet and get regular exercise, this will help bring your triglyceride level down.  Please feel free to call the clinic if you have any questions.    Sincerely,  Ailyn Posadas MD    "

## 2018-09-21 NOTE — MR AVS SNAPSHOT
After Visit Summary   9/21/2018    Misty Girard    MRN: [id number]           Patient Information     Date Of Birth          1970        Visit Information        Provider Department      9/21/2018 2:00 PM Lainey Posadas MD [facility]        Today's Diagnoses     Routine general medical examination at a health care facility    -  1    Constipation, unspecified constipation type        Anxiety        Osteoarthritis of both knees, unspecified osteoarthritis type        Type 2 diabetes mellitus with diabetic neuropathy, without long-term current use of insulin (H)          Care Instructions    Here is the plan from today's visit    1. Constipation, unspecified constipation type  - calcium polycarbophil (FIBERCON) 625 MG tablet; Take 2 tablets (1,250 mg) by mouth daily  Dispense: 30 tablet; Refill: 3    2. Routine general medical examination at a health care facility  - Lipid [place] ([facility])  - TSH with free T4 reflex  - Vitamin D Level  - Hemoglobin A1c ([facility])    3. Anxiety  Keep your appointment with Dr. Blanton    4. Osteoarthritis of both knees, unspecified osteoarthritis type  If your pain worsens, come back in.    5. Type 2 diabetes mellitus with diabetic neuropathy, without long-term current use of insulin (H)  -A1c  - metformin 1000 twice a day  - follow-up with me in 1 week      Please call or return to clinic if your symptoms don't go away.    Follow up plan  Please make a clinic appointment for follow up with me (LAINEY POSADAS) in 1  week for DM follow-up.    Thank you for coming to [facility] today.  Lab Testing:  **If you had lab testing today and your results are reassuring or normal they will be mailed to you or sent through [facility] within 7 days.   **If the lab tests need quick action we will call you with the results.  The phone number we will call with results is # [phone] (home) . If this is not the best number please call our clinic and change  the number.  Medication Refills:  If you need any refills please call your pharmacy and they will contact us.   If you need to  your refill at a new pharmacy, please contact the new pharmacy directly. The new pharmacy will help you get your medications transferred faster.   Scheduling:  If you have any concerns about today's visit or wish to schedule another appointment please call our office during normal business hours [phone] (8-5:00 M-F)  If a referral was made to a [facility] and you don't get a call from central scheduling please call [phone].  If a Mammogram was ordered for you at [facility] call [phone] to schedule or change your appointment.  If you had an XRay/CT/Ultrasound/MRI ordered the number is [phone] to schedule or change your radiology appointment.   Medical Concerns:  If you have urgent medical concerns please call [phone] at any time of the day.    Tiffany Posadas MD           Follow-ups after your visit        Your next 10 appointments already scheduled     Sep 24, 2018  2:00 PM CDT   Return Visit with Agueda Vera   [facility] ([facility])    [address],  [facility] [address]  [place] [id number] [phone]            Oct 04, 2018  3:00 PM CDT   CONSULT with Helen Blanton MD   [facility] ([facility])    [address],  [facility] [address]  [place] [address]   [phone]              Who to contact     Please call your clinic at [phone] to:    Ask questions about your health    Make or cancel appointments    Discuss your medicines    Learn about your test results    Speak to your doctor            Additional Information About Your Visit        Care EveryWhere ID     This is your Care EveryWhere ID. This could be used by other organizations to access your [place] medical records  [id number]        Your Vitals Were     Pulse Respirations Pulse  Oximetry BMI (Body Mass Index)          94 16 97% 45 kg/m2         Blood Pressure from Last 3 Encounters:   09/21/18 124/81   08/29/18 119/76   02/07/12 112/80    Weight from Last 3 Encounters:   09/21/18 278 lb 12.8 oz (126.5 kg)   08/29/18 281 lb 12.8 oz (127.8 kg)   02/07/12 262 lb 1.6 oz (118.9 kg)              We Performed the Following     Hemoglobin A1c ([facility])     Lipid [place] ([facility])     TSH with free T4 reflex     Vitamin D Level          Today's Medication Changes          These changes are accurate as of 9/21/18  3:11 PM.  If you have any questions, ask your nurse or doctor.               Start taking these medicines.        Dose/Directions    calcium polycarbophil 625 MG tablet   Commonly known as:  FIBERCON   Used for:  Constipation, unspecified constipation type   Started by:  Tiffany Posadas MD        Dose:  2 tablet   Take 2 tablets (1,250 mg) by mouth daily   Quantity:  30 tablet   Refills:  3       order for DME   Used for:  Osteoarthritis of both knees, unspecified osteoarthritis type   Started by:  Tiffany Posadas MD        Equipment being ordered: Knee Sleeve, both knees, dispense 2   Quantity:  2 Device   Refills:  0         Stop taking these medicines if you haven't already. Please contact your care team if you have questions.     PSYLLIUM PO   Stopped by:  Tiffany Posadas MD                Where to get your medicines      These medications were sent to [facility] [place] [address] - [place] - [address]  [address], [place] [address]     Phone:  [phone]     calcium polycarbophil 625 MG tablet         Some of these will need a paper prescription and others can be bought over the counter.  Ask your nurse if you have questions.     Bring a paper prescription for each of these medications     order for DME                Primary Care Provider Office Phone #    Agueda Vera [phone]       [address], [address]  [facility]  [address]        Equal Access to Services     [facility] Center[facility]HELBI : Hadii troy narayan carolyn Diaz, waleeda luqекатерина, qajellyta josianefarzanamonica herring, waxchris gulshan choiarlettecarol harding. So [facility] [phone].    ATENCIÓN: Si habla español, tiene a barbour disposición servicios gratuitos de asistencia lingüística. Walkerame al [phone].    We comply with applicable federal civil rights laws and [place] laws. We do not discriminate on the basis of race, color, national origin, age, disability, sex, sexual orientation, or gender identity.            Thank you!     Thank you for choosing [facility]  for your care. Our goal is always to provide you with excellent care. Hearing back from our patients is one way we can continue to improve our services. Please take a few minutes to complete the written survey that you may receive in the mail after your visit with us. Thank you!             Your Updated Medication List - Protect others around you: Learn how to safely use, store and throw away your medicines at www.disposemymeds.org.          This list is accurate as of 9/21/18  3:11 PM.  Always use your most recent med list.                   Brand Name Dispense Instructions for use Diagnosis    acetaminophen 500 MG tablet    TYLENOL     Take 500 mg by mouth        albuterol 108 (90 Base) MCG/ACT inhaler    PROAIR HFA/PROVENTIL HFA/VENTOLIN HFA     Inhale 2 puffs into the lungs        aspirin 81 MG tablet     100    1 TABLET DAILY    Hypothyroidism       Blood Pressure Monitor Misc      As directed. Dx 401.9        BUSPAR 5 MG tablet   Generic drug:  busPIRone     60 Tab    1 tab twice daily    Generalised anxiety disorder       calcium polycarbophil 625 MG tablet    FIBERCON    30 tablet    Take 2 tablets (1,250 mg) by mouth daily    Constipation, unspecified constipation type       CONTOUR NEXT TEST test strip   Generic drug:  blood glucose monitoring      USE TO TEST BLOOD GLUCOSE FOUR TIMES DAILY;USE TO TEST BLOOD  GLUCOSE AS NEEDED        DOCOSAHEXAENOIC ACID PO      Take 1,000 mg by mouth        hydrOXYzine 50 MG capsule    VISTARIL     TAKE 1 CAP BY MOUTH TWICE A DAY        IRON PO           levonorgestrel 20 MCG/24HR IUD    MIRENA     1 Device by Intrauterine route        lisinopril 2.5 MG tablet    PRINIVIL/Zestril    30 tablet    Take 1 tablet (2.5 mg) by mouth daily    Benign essential hypertension       metFORMIN 500 MG tablet    GLUCOPHAGE     TAKE 2 TABS (1,000MG) BY MOUTH TWICE A DAY WITH MEALS        mineral oil-hydrophilic petrolatum      APPLY 1 4 MED CUP TO AFFECT ED AREA(S) DAILY AS NEEDED FOR 14 DAYS        * MOTRIN 800 MG tablet   Generic drug:  ibuprofen     90 Tab    ONE TABLET 3 TIMES A DAY WITH FOOD    PMS (premenstrual syndrome)       * ibuprofen 400 MG tablet    ADVIL/MOTRIN     Take 400 mg by mouth        nystatin ointment    MYCOSTATIN     Apply topically 2 times daily        omega 3 1000 MG Caps     90 capsule    Take 1 g by mouth daily    Benign essential hypertension       order for DME     2 Device    Equipment being ordered: Knee Sleeve, both knees, dispense 2    Osteoarthritis of both knees, unspecified osteoarthritis type       prenatal multivitamin plus iron 27-0.8 MG Tabs per tablet     100 tablet    Take 1 tablet by mouth daily    Benign essential hypertension       PRENATAL VITAMIN TABS   OR     100    1 TABLET DAILY    Fatigue       * propranolol 10 MG tablet    INDERAL    60    1/2 tab up to 4 times daily    Generalised anxiety disorder       * propranolol 10 MG tablet    INDERAL    90 tablet    Take 1 tablet (10 mg) by mouth 3 times daily AS NEEDED for anxiety    ALONA (generalized anxiety disorder), Panic disorder without agoraphobia, Benign essential hypertension       SYNTHROID 50 MCG tablet   Generic drug:  levothyroxine     90    1 TABLET DAILY    Hypothyroidism       [facility] Misc      USE TO TEST BLOOD GLUCOSE FOUR TIMES DAILY;USE TO TEST BLOOD GLUCOSE AS NEEDED         VITAMIN D (CHOLECALCIFEROL) PO      Take 2,000 Units by mouth daily        * Notice:  This list has 4 medication(s) that are the same as other medications prescribed for you. Read the directions carefully, and ask your doctor or other care provider to review them with you.

## 2018-09-21 NOTE — PROGRESS NOTES
HPI       Misty Girard is a 48 year old  who presents for   Chief Complaint   Patient presents with     RECHECK     She is a [place] [facility] for methamphetamine use disorder and has recently established care with us here. Her care prior to this had been in [place], [place]. She is here today for multiple concerns.     1. Methamphetamine use disorder, 190 days sober and doing well [facility]    2. OA: Bakers cysts bilaterally. Pain is well controlled currently on no medications. Working on weight loss. Has had cortisol shots in the past with good relief. , has had left cortisol shot. OA. Pain is well controlled without medications. Knee brace. Stable. Cortisone shots.     3. Hand and toe tingling and numbness. Has been present for many years. No recent changes.     4. Anxiety.  Recently started on propanolol, which she feels is helping. Has an appointment with Dr. Blanton on 10/4, but is wondering if she should keep this.    5. Medications. Needs fiber tabs for constipation.    6. DM, per patient (do not have records) last A1c was 8. Checks her sugars at home and ranges . On metformin 1000 twice a day.     7. Lab results, had labs done last visit and would like to review    8. Vit D deficiency, would like her vit D checked today    +++++++      Problem, Medication and Allergy Lists were reviewed and updated if needed..    Patient is an established patient of this clinic..         Review of Systems:   Review of Systems   Constitutional: Negative for activity change, appetite change, fatigue, fever and unexpected weight change.   HENT: Negative for congestion.    Eyes: Negative.    Respiratory: Negative for cough and shortness of breath.    Cardiovascular: Negative for chest pain, palpitations and leg swelling.   Gastrointestinal: Negative for abdominal distention, abdominal pain, constipation and diarrhea.   Genitourinary: Negative for difficulty urinating and dysuria.   Musculoskeletal:  Positive for arthralgias (bilateral knee pain, chronic and mild).   Skin: Negative for rash.   Neurological: Positive for numbness (hands and feet). Negative for weakness, light-headedness and headaches.   Psychiatric/Behavioral: Negative for agitation, dysphoric mood and sleep disturbance. The patient is nervous/anxious.             Physical Exam:     Vitals:    09/21/18 1350   BP: 124/81   Pulse: 94   Resp: 16   SpO2: 97%   Weight: 278 lb 12.8 oz (126.5 kg)     Body mass index is 45 kg/(m^2).  Vitals were reviewed and were normal     Physical Exam   Constitutional: She is oriented to person, place, and time. She appears well-developed and well-nourished. No distress.   HENT:   Head: Normocephalic and atraumatic.   Eyes: EOM are normal. Pupils are equal, round, and reactive to light.   Neck: Normal range of motion.   Cardiovascular: Normal rate and regular rhythm.    No murmur heard.  Pulmonary/Chest: Effort normal and breath sounds normal. No respiratory distress.   Abdominal: Soft. Bowel sounds are normal. She exhibits no distension. There is no tenderness.   Musculoskeletal: Normal range of motion.   Neurological: She is alert and oriented to person, place, and time.   Skin: Skin is warm and dry.   Psychiatric: Her speech is normal. Her mood appears anxious. She is hyperactive. She does not exhibit a depressed mood.   Nursing note and vitals reviewed.        Results:      Results from this visit  Results for orders placed or performed in visit on 09/21/18   Lipid [place] ([facility])   Result Value Ref Range    Cholesterol 202.2 (H) 0.0 - 200.0 mg/dL    Cholesterol/HDL Ratio 6.1 (H) 0.0 - 5.0    HDL Cholesterol 33.4 (L) >40.0 mg/dL    LDL Cholesterol Calculated 119 0 - 129 mg/dL    Triglycerides 250.9 (H) 0.0 - 150.0 mg/dL    VLDL Cholesterol 50.2 (H) 7.0 - 32.0 mg/dL   Hemoglobin A1c ([facility])   Result Value Ref Range    Hemoglobin A1C 6.7 (H) 4.1 - 5.7 %       Assessment and Plan        1. Constipation,  unspecified constipation type  - calcium polycarbophil (FIBERCON) 625 MG tablet; Take 2 tablets (1,250 mg) by mouth daily  Dispense: 30 tablet; Refill: 3    2. Routine general medical examination at a health care facility  - Lipid [place] ([facility])  - TSH with free T4 reflex  - Vitamin D Level    3. Anxiety  Feels that her anxiety has lessened since started propranolol this month  - continue propanolol  - keep appointment with Dr. Blanton of 10/4    4. Osteoarthritis of both knees, unspecified osteoarthritis type  Mild pain today.  XR5/23/17  Left knee:  Minor degenerative changes lateral knee joint compartment.  Right knee:  There are no degenerative changes medial joint compartment.  Degenerative reactive changes lateral knee joint compartment and patellofemoral joint space.  - order for DME; Equipment being ordered: Knee Sleeve, both knees, dispense 2  Dispense: 2 Device; Refill: 0    5. Type 2 diabetes mellitus with diabetic neuropathy, without long-term current use of insulin (H)  States her blood sugar has been well controlled . Is concerned about getting too low. Discussed that her DM is likely the source of her hand and toe numbness and tingling and that better glucose control will help.  - Continue metformin 1000 twice a day  - Hemoglobin A1c ([facility])  - monitor blood sugars for next week and bring these in to assess risk of hypoglycemia    6. Benign essential hypertension  - order for DME; Equipment being ordered: Digital home blood pressure monitor kit with cuff  Dispense: 1 Device; Refill: 0  - continue lisinopril 2.5    7. Methamphetamine use  [facility]. 190 days sober and doing well!       There are no discontinued medications.    Options for treatment and follow-up care were reviewed with the patient. Misty Girard  engaged in the decision making process and verbalized understanding of the options discussed and agreed with the final plan.    Tiffany Posadas MD

## 2018-09-21 NOTE — PATIENT INSTRUCTIONS
Here is the plan from today's visit    1. Constipation, unspecified constipation type  - calcium polycarbophil (FIBERCON) 625 MG tablet; Take 2 tablets (1,250 mg) by mouth daily  Dispense: 30 tablet; Refill: 3    2. Routine general medical examination at a health care facility  - Lipid [place] ([facility]  - TSH with free T4 reflex  - Vitamin D Level  - Hemoglobin A1c ([facility])    3. Anxiety  Keep your appointment with Dr. Blanton    4. Osteoarthritis of both knees, unspecified osteoarthritis type  If your pain worsens, come back in.    5. Type 2 diabetes mellitus with diabetic neuropathy, without long-term current use of insulin (H)  -A1c  - metformin 1000 twice a day  - follow-up with me in 1 week      Please call or return to clinic if your symptoms don't go away.    Follow up plan  Please make a clinic appointment for follow up with me (LAINEY VELAZQUEZ) in 1  week for DM follow-up.    Thank you for coming to [facility] today.  Lab Testing:  **If you had lab testing today and your results are reassuring or normal they will be mailed to you or sent through [facility] within 7 days.   **If the lab tests need quick action we will call you with the results.  The phone number we will call with results is # [phone] (home) . If this is not the best number please call our clinic and change the number.  Medication Refills:  If you need any refills please call your pharmacy and they will contact us.   If you need to  your refill at a new pharmacy, please contact the new pharmacy directly. The new pharmacy will help you get your medications transferred faster.   Scheduling:  If you have any concerns about today's visit or wish to schedule another appointment please call our office during normal business hours [phone] (8-5:00 M-F)  If a referral was made to a [facility] and you don't get a call from central scheduling please call [phone].  If a Mammogram was ordered for you at [facility] call [phone] to schedule or change your appointment.  If you had an XRay/CT/Ultrasound/MRI ordered the number is [phone] to schedule or change your radiology appointment.   Medical Concerns:  If you have urgent medical concerns please call [phone] at any time of the day.    Tiffany Posadas MD

## 2018-09-24 LAB — DEPRECATED CALCIDIOL+CALCIFEROL SERPL-MC: 28 UG/L (ref 20–75)

## 2018-09-25 NOTE — TELEPHONE ENCOUNTER
"Message per PCP: \"    I do not believe her fish oil is medically necessary for a prior authorization. I will not write a letter of appeal at this point, she may pay out of pocket for these if she would like. Please call the patient to inform her of this. Thank you     RN routed to PA team to call patient and follow up regarding process. Will not proceed with appeal.    Mary Holt RN  "

## 2018-09-28 ENCOUNTER — OFFICE VISIT (OUTPATIENT)
Dept: FAMILY MEDICINE | Facility: CLINIC | Age: 48
End: 2018-09-28
Payer: MEDICAID

## 2018-09-28 VITALS
WEIGHT: 274.6 LBS | BODY MASS INDEX: 44.32 KG/M2 | SYSTOLIC BLOOD PRESSURE: 119 MMHG | RESPIRATION RATE: 16 BRPM | HEART RATE: 85 BPM | OXYGEN SATURATION: 96 % | TEMPERATURE: 98.4 F | DIASTOLIC BLOOD PRESSURE: 84 MMHG

## 2018-09-28 DIAGNOSIS — Z97.5 IUD (INTRAUTERINE DEVICE) IN PLACE: Primary | ICD-10-CM

## 2018-09-28 DIAGNOSIS — Z00.00 HEALTHCARE MAINTENANCE: ICD-10-CM

## 2018-09-28 DIAGNOSIS — M17.0 OSTEOARTHRITIS OF BOTH KNEES, UNSPECIFIED OSTEOARTHRITIS TYPE: ICD-10-CM

## 2018-09-28 DIAGNOSIS — Z86.39 HISTORY OF VITAMIN D DEFICIENCY: ICD-10-CM

## 2018-09-28 DIAGNOSIS — E11.8 TYPE 2 DIABETES MELLITUS WITH COMPLICATION, WITHOUT LONG-TERM CURRENT USE OF INSULIN (H): ICD-10-CM

## 2018-09-28 RX ORDER — PNV NO.95/FERROUS FUM/FOLIC AC 28MG-0.8MG
1000 TABLET ORAL DAILY
Qty: 90 CAPSULE | Refills: 3 | Status: SHIPPED | OUTPATIENT
Start: 2018-09-28 | End: 2018-10-18

## 2018-09-28 RX ORDER — MULTIVIT-MIN/IRON/FOLIC ACID/K 18-600-40
1000 CAPSULE ORAL DAILY
Qty: 90 TABLET | Refills: 3 | Status: SHIPPED | OUTPATIENT
Start: 2018-09-28 | End: 2018-10-18

## 2018-09-28 NOTE — PROGRESS NOTES
"       HPI       Misty Girard is a 48 year old  who presents for   Chief Complaint   Patient presents with     IUD     Removal      Labs Only     Had Mirena IUD placed 5/23/2018. She is wondering if she should have it removed because she is menopausal. Patient reports she was seen by a provider in [place], [place], Dr. Beyer where labs were checked and she was told they were \"positive for menopause.\" Per review of Care Everywhere, labs were done 7/16/2018 with FSH 42.7 and LH 12.1. FSH in the post-menopausal range. She reports her mother experienced menopause at about this age. She reports her LMP was about 2 years ago.    Additional concerns:  1)Discuss lab results from visit on 9/21/2018. Results discussed    2) Patient is requesting referral to endocrinology for ongoing management of her DM. Says she was seen by endocrinology in the past and would like to have them manage.     3)Discussed recommended vaccines. Patient meets criteria for dose of PPSV23 between ages 19-64 due to history of DM and smoking history. Patient is agreeable to have this administered today. Do not yet have influenza vaccine available in clinic to administer.     +++++++    Problem, Medication and Allergy Lists were reviewed and updated if needed..    Patient is an established patient of this clinic..         Review of Systems:   Review of Systems   Constitutional: Negative for chills, diaphoresis, fatigue, fever and unexpected weight change.        Has an intentional 6lb weight loss in 2 weeks   Respiratory: Negative for cough and shortness of breath.    Cardiovascular: Negative for chest pain and leg swelling.   Gastrointestinal: Negative for abdominal pain, constipation, diarrhea, nausea and vomiting.   Genitourinary: Negative for menstrual problem, vaginal bleeding, vaginal discharge and vaginal pain.   Musculoskeletal: Positive for arthralgias (knee pain).            Physical Exam:     Vitals:    09/28/18 1022   BP: 119/84   Pulse: 85 "   Resp: 16   Temp: 98.4  F (36.9  C)   TempSrc: Oral   SpO2: 96%   Weight: 274 lb 9.6 oz (124.6 kg)     Body mass index is 44.32 kg/(m^2).  Vital signs normal except BMI     Physical Exam   Constitutional: No distress.   HENT:   Head: Normocephalic and atraumatic.   Cardiovascular: Normal rate, regular rhythm and normal heart sounds.    Pulmonary/Chest: Effort normal and breath sounds normal.   Skin: She is not diaphoretic.   Psychiatric: She has a normal mood and affect. She is hyperactive.       Results:   Results from last visit:  Office Visit on 09/21/2018   Component Date Value Ref Range Status     Cholesterol 09/21/2018 202.2* 0.0 - 200.0 mg/dL Final     Cholesterol/HDL Ratio 09/21/2018 6.1* 0.0 - 5.0 Final     HDL Cholesterol 09/21/2018 33.4* >40.0 mg/dL Final     LDL Cholesterol Calculated 09/21/2018 119  0 - 129 mg/dL Final     Triglycerides 09/21/2018 250.9* 0.0 - 150.0 mg/dL Final     VLDL Cholesterol 09/21/2018 50.2* 7.0 - 32.0 mg/dL Final     TSH 09/21/2018 2.47  0.40 - 4.00 mU/L Final     Vitamin D Deficiency screening 09/21/2018 28  20 - 75 ug/L Final    Comment: Season, race, dietary intake, and treatment affect the concentration of   25-hydroxy-Vitamin D. Values may decrease during winter months and increase   during summer months. Values 20-29 ug/L may indicate Vitamin D insufficiency   and values <20 ug/L may indicate Vitamin D deficiency.  Vitamin D determination is routinely performed by an immunoassay specific for   25 hydroxyvitamin D3.  If an individual is on vitamin D2 (ergocalciferol)   supplementation, please specify 25 OH vitamin D2 and D3 level determination by   LCMSMS test VITD23.       Hemoglobin A1C 09/21/2018 6.7* 4.1 - 5.7 % Final       Assessment and Plan        1. IUD (intrauterine device) in place  Patient wanted to discuss if she needs to have her IUD removed because she is menopausal. Because her FSH is elevated and from her LMP report, it appears she is menopausal, the IUD  can be removed if that is what patient is desiring. There is also little to no risk in keeping IUD in place and it can provide benefit of protection from endometrial hyperplasia. After discussion, patient would like to keep IUD in place for an additional 1-2 years.     2. Healthcare maintenance  Pneumovax dose administered today as patient is a diabetic and has a tobacco smoking history.   - ADMIN VACCINE, INITIAL  - Pneumococcal vaccine 23 valent  PPSV23 (Pneumovax) [[id number]]  - Omega-3 Fatty Acids (FISH OIL OMEGA-3) 1000 MG CAPS; Take 1,000 mg by mouth daily  Dispense: 90 capsule; Refill: 3    3. Type 2 diabetes mellitus with complication, without long-term current use of insulin (H)  Endocrinology referral placed at patient's request.   - ENDOCRINOLOGY ADULT REFERRAL - INTERNAL    4. History of vitamin D deficiency  - Vitamin D, Cholecalciferol, 1000 units TABS; Take 1,000 Units by mouth daily  Dispense: 90 tablet; Refill: 3    5. Osteoarthritis of both knees, unspecified osteoarthritis type  - order for DME; Equipment being ordered: Knee Sleeve, both knees, dispense 2  Dispense: 2 Device; Refill: 0       There are no discontinued medications.    Options for treatment and follow-up care were reviewed with the patient. Misty Girard  engaged in the decision making process and verbalized understanding of the options discussed and agreed with the final plan.    Carlene Mccallum MD  Family Medicine PGY3

## 2018-09-28 NOTE — PATIENT INSTRUCTIONS
We reviewed your labs today.     I placed a referral to endocrinology at your request to discuss diabetes.     Ordered your Vit d and Fish Oil omega 3    We did not remove your IUD today.

## 2018-09-28 NOTE — MR AVS SNAPSHOT
After Visit Summary   9/28/2018    Misty Girard    MRN: [id number]           Patient Information     Date Of Birth          1970        Visit Information        Provider Department      9/28/2018 10:20 AM Gaetano Mccallum MD [facility]        Today's Diagnoses     Healthcare maintenance    -  1    History of vitamin D deficiency        Osteoarthritis of both knees, unspecified osteoarthritis type        Type 2 diabetes mellitus with complication, without long-term current use of insulin (H)          Care Instructions    We reviewed your labs today.     I placed a referral to endocrinology at your request to discuss diabetes.     Ordered your Vit d and Fish Oil omega 3    We did not remove your IUD today.           Follow-ups after your visit        Additional Services     ENDOCRINOLOGY ADULT REFERRAL - INTERNAL       Per patient request to discuss glucose and diabetes management.     Your provider has referred you to: [facility]: Endocrinology and Diabetes [facility] - [place] [phone]   [email or web address]      Please be aware that coverage of these services is subject to the terms and limitations of your health insurance plan.  Call member services at your health plan with any benefit or coverage questions.      Please bring the following to your appointment:    >>   Any x-rays, CTs or MRIs which have been performed.  Contact the facility where they were done to arrange for  prior to your scheduled appointment.    >>   List of current medications   >>   This referral request   >>   Any documents/labs given to you for this referral                  Your next 10 appointments already scheduled     Oct 04, 2018  3:00 PM CDT   CONSULT with MD [facility] ([facility])    [facility] [address] [place] [address],  [address]  [place] [id number] [phone]              Who to contact      Please call your clinic at [phone] to:    Ask questions about your health    Make or cancel appointments    Discuss your medicines    Learn about your test results    Speak to your doctor            Additional Information About Your Visit        Care EveryWhere ID     This is your Care EveryWhere ID. This could be used by other organizations to access your [facility] medical records  [id number]        Your Vitals Were     Pulse Temperature Respirations Pulse Oximetry BMI (Body Mass Index)       85 98.4  F (36.9  C) (Oral) 16 96% 44.32 kg/m2        Blood Pressure from Last 3 Encounters:   09/28/18 119/84   09/21/18 124/81   08/29/18 119/76    Weight from Last 3 Encounters:   09/28/18 274 lb 9.6 oz (124.6 kg)   09/21/18 278 lb 12.8 oz (126.5 kg)   08/29/18 281 lb 12.8 oz (127.8 kg)              We Performed the Following     ADMIN VACCINE, INITIAL     ENDOCRINOLOGY ADULT REFERRAL - INTERNAL     Pneumococcal vaccine 23 valent  PPSV23 (Pneumovax) [[id number]]          Today's Medication Changes          These changes are accurate as of 9/28/18 10:55 AM.  If you have any questions, ask your nurse or doctor.               These medicines have changed or have updated prescriptions.        Dose/Directions    Vitamin D (Cholecalciferol) 1000 units Tabs   This may have changed:    - medication strength  - how much to take   Used for:  History of vitamin D deficiency   Changed by:  Gaetano Mccallum MD        Dose:  1000 Units   Take 1,000 Units by mouth daily   Quantity:  90 tablet   Refills:  3            Where to get your medicines      These medications were sent to [facility] [place] [id number] - [place], [place] - [address]  [address], [place] [address]     Phone:  [phone]     FISH OIL OMEGA-3 1000 MG Caps    Vitamin D (Cholecalciferol) 1000 units Tabs         Some of these will need a paper prescription and others can be bought over the counter.  Ask your nurse if you have questions.      Bring a paper prescription for each of these medications     order for DME                Primary Care Provider Office Phone #    Agueda Vera [phone]       [address], [address]  [facility] [address]        Equal Access to Services     MARAH MONTOYA : Hadii aad ku hadyenyo Emily, waleeda luqadaha, qaybta kaalmada adejason, chantal conner jose harding. So [facility] [phone].    ATENCIÓN: Si habla español, tiene a barbour disposición servicios gratuitos de asistencia lingüística. Llame al [phone].    We comply with applicable federal civil rights laws and [place] laws. We do not discriminate on the basis of race, color, national origin, age, disability, sex, sexual orientation, or gender identity.            Thank you!     Thank you for choosing [facility]  for your care. Our goal is always to provide you with excellent care. Hearing back from our patients is one way we can continue to improve our services. Please take a few minutes to complete the written survey that you may receive in the mail after your visit with us. Thank you!             Your Updated Medication List - Protect others around you: Learn how to safely use, store and throw away your medicines at www.disposemymeds.org.          This list is accurate as of 9/28/18 10:55 AM.  Always use your most recent med list.                   Brand Name Dispense Instructions for use Diagnosis    acetaminophen 500 MG tablet    TYLENOL     Take 500 mg by mouth        albuterol 108 (90 Base) MCG/ACT inhaler    PROAIR HFA/PROVENTIL HFA/VENTOLIN HFA     Inhale 2 puffs into the lungs        aspirin 81 MG tablet     100    1 TABLET DAILY    Hypothyroidism       Blood Pressure Monitor Misc      As directed. Dx 401.9        BUSPAR 5 MG tablet   Generic drug:  busPIRone     60 Tab    1 tab twice daily    Generalised anxiety disorder       calcium polycarbophil 625 MG tablet    FIBERCON    30 tablet    Take 2  tablets (1,250 mg) by mouth daily    Constipation, unspecified constipation type       CONTOUR NEXT TEST test strip   Generic drug:  blood glucose monitoring      USE TO TEST BLOOD GLUCOSE FOUR TIMES DAILY;USE TO TEST BLOOD GLUCOSE AS NEEDED        DOCOSAHEXAENOIC ACID PO      Take 1,000 mg by mouth        FISH OIL OMEGA-3 1000 MG Caps     90 capsule    Take 1,000 mg by mouth daily    Healthcare maintenance       hydrOXYzine 50 MG capsule    VISTARIL     TAKE 1 CAP BY MOUTH TWICE A DAY        IRON PO           levonorgestrel 20 MCG/24HR IUD    MIRENA     1 Device by Intrauterine route        lisinopril 2.5 MG tablet    PRINIVIL/Zestril    30 tablet    Take 1 tablet (2.5 mg) by mouth daily    Benign essential hypertension       metFORMIN 500 MG tablet    GLUCOPHAGE     TAKE 2 TABS (1,000MG) BY MOUTH TWICE A DAY WITH MEALS        mineral oil-hydrophilic petrolatum      APPLY 1 4 MED CUP TO AFFECT ED AREA(S) DAILY AS NEEDED FOR 14 DAYS        * MOTRIN 800 MG tablet   Generic drug:  ibuprofen     90 Tab    ONE TABLET 3 TIMES A DAY WITH FOOD    PMS (premenstrual syndrome)       * ibuprofen 400 MG tablet    ADVIL/MOTRIN     Take 400 mg by mouth        nystatin ointment    MYCOSTATIN     Apply topically 2 times daily        order for DME     1 Device    Equipment being ordered: Digital home blood pressure monitor kit with cuff    Benign essential hypertension       order for DME     2 Device    Equipment being ordered: Knee Sleeve, both knees, dispense 2    Osteoarthritis of both knees, unspecified osteoarthritis type       prenatal multivitamin plus iron 27-0.8 MG Tabs per tablet     100 tablet    Take 1 tablet by mouth daily    Benign essential hypertension       PRENATAL VITAMIN TABS   OR     100    1 TABLET DAILY    Fatigue       * propranolol 10 MG tablet    INDERAL    60    1/2 tab up to 4 times daily    Generalised anxiety disorder       * propranolol 10 MG tablet    INDERAL    90 tablet    Take 1 tablet (10 mg) by  mouth 3 times daily AS NEEDED for anxiety    ALONA (generalized anxiety disorder), Panic disorder without agoraphobia, Benign essential hypertension       SYNTHROID 50 MCG tablet   Generic drug:  levothyroxine     90    1 TABLET DAILY    Hypothyroidism       UNISTIK 3 COMFORT Misc      USE TO TEST BLOOD GLUCOSE FOUR TIMES DAILY;USE TO TEST BLOOD GLUCOSE AS NEEDED        Vitamin D (Cholecalciferol) 1000 units Tabs     90 tablet    Take 1,000 Units by mouth daily    History of vitamin D deficiency       * Notice:  This list has 4 medication(s) that are the same as other medications prescribed for you. Read the directions carefully, and ask your doctor or other care provider to review them with you.

## 2018-09-28 NOTE — PROGRESS NOTES
Preceptor Attestation:   Patient seen, evaluated and discussed with the resident. I have verified the content of the note, which accurately reflects my assessment of the patient and the plan of care.   Supervising Physician:  Alvin Jacob MD

## 2018-09-29 PROBLEM — E66.01 MORBID OBESITY WITH BMI OF 40.0-44.9, ADULT (H): Status: ACTIVE | Noted: 2018-07-16

## 2018-09-29 PROBLEM — E11.42 DIABETIC PERIPHERAL NEUROPATHY (H): Status: ACTIVE | Noted: 2018-07-16

## 2018-09-29 PROBLEM — M17.12 OSTEOARTHRITIS OF LEFT KNEE: Status: ACTIVE | Noted: 2017-07-06

## 2018-09-29 PROBLEM — Z87.891 HISTORY OF TOBACCO ABUSE: Status: ACTIVE | Noted: 2018-07-16

## 2018-09-29 PROBLEM — Z71.89 ACP (ADVANCE CARE PLANNING): Status: ACTIVE | Noted: 2018-07-16

## 2018-09-29 ASSESSMENT — ENCOUNTER SYMPTOMS
FEVER: 0
CONSTIPATION: 0
DIARRHEA: 0
CHILLS: 0
ARTHRALGIAS: 1
UNEXPECTED WEIGHT CHANGE: 0
COUGH: 0
ABDOMINAL PAIN: 0
NAUSEA: 0
SHORTNESS OF BREATH: 0
VOMITING: 0
DIAPHORESIS: 0
FATIGUE: 0

## 2018-10-04 ENCOUNTER — OFFICE VISIT (OUTPATIENT)
Dept: PSYCHOLOGY | Facility: CLINIC | Age: 48
End: 2018-10-04
Payer: COMMERCIAL

## 2018-10-04 VITALS
WEIGHT: 276.4 LBS | BODY MASS INDEX: 44.61 KG/M2 | OXYGEN SATURATION: 96 % | SYSTOLIC BLOOD PRESSURE: 117 MMHG | TEMPERATURE: 98.8 F | DIASTOLIC BLOOD PRESSURE: 80 MMHG | HEART RATE: 76 BPM

## 2018-10-04 DIAGNOSIS — F41.9 ANXIOUS MOOD: Primary | ICD-10-CM

## 2018-10-04 DIAGNOSIS — F33.1 MODERATE EPISODE OF RECURRENT MAJOR DEPRESSIVE DISORDER (H): ICD-10-CM

## 2018-10-04 NOTE — MR AVS SNAPSHOT
After Visit Summary   10/4/2018    Misty Girard    MRN: [id number]           Patient Information     Date Of Birth          1970        Visit Information        Provider Department      10/4/2018 3:00 PM Helen Blanton MD [facility]        Today's Diagnoses     Anxious mood    -  1      Care Instructions    Misty,    It was nice meeting you today. Here is a summary of our visit:    - We agree with your current plans.  - Keep up the positive work at [employer].  - It's okay to keep using the propranolol as a PRN for anxiety attacks.  - Though we cannot offer the Genesight testing, it would be reasonable to get it done in order to figure out which medications are less likely to give you side effects.  - We are not recommending starting a new medication today. Just keep on doing what you are doing ([employer] Program, sobriety, exercise and healthy diet, your Church practices, using your sleep apnea machine, and paracording).  - We would recommend a bright light box. I will provide a prescription for this. Instructions are below.    It was a pleasure meeting you today. If you have further your questions, you are welcome to contact your PCP.    Thanks!    Katarzyna Boothe and Bass    Light Therapy Instructions:    INSTRUCTIONS FOR LIGHT BOX THERAPY    PLACE THE LIGHT BOX ON A TABLE OR COUNTER WHERE YOU CAN SIT COMFORTABLY.  FOLLOW THE 'S INFORMATION FOR THE DISTANCE TO THE LIGHT BOX.  YOU SHOULD NOT STARE DIRECTLY INTO THE LIGHT. YOU CAN READ OR EAT WHILE USING THE LIGHT. YOUR EYES MUST BE OPEN.  START WITH 30 MINUTES OF LIGHT EXPOSURE AS SOON AS POSSIBLE AFTER WAKING ( BETWEEN 6AM AND 9 AM).  MOST PEOPLE USE LIGHT THERAPY THROUGH THE WINTER UNTIL SPRINGTIME.    WHEN TO EXPECT A RESPONSE    YOU SHOULD NOTICE A RESPONSE IN A FEW DAYS AND BY TWO WEEKS DEFINITE IMPROVEMENT.   WHEN LIGHT THERAPY IS STOPPED IT MAY BE A FEW DAYS BEFORE SYMPTOMS REAPPEAR.    WHAT  TO DO IF IMPROVEMENT IS NOT NOTICED    IF NO IMPROVEMENT AFTER 10 TO 14 DAYS, TRY SPENDING UP TO 60 MINUTES PER DAY IN FRONT OF THE LIGHT EVERY MORNING OR DIVIDING THE TIME BETWEEN MORNING AND EVENING. DO NOT USE CLOSE TO BEDTIME.    Psychiatry Outpatient Clinic     [facility]  [address]  [address] 2, [address]  [place] [address]     Map and Directions    Hours: M-F (8:00 AM--5:00 PM)  Evening hours only available as arranged by your provider     Appointments:  [phone]          Follow-ups after your visit        Additional Services     DME REFERRAL       Full spectrum 10,000 lux light box (Procedure code )  Will use 30 min every morning and afternoon in fall and winter months.                  Who to contact     Please call your clinic at [phone] to:    Ask questions about your health    Make or cancel appointments    Discuss your medicines    Learn about your test results    Speak to your doctor            Additional Information About Your Visit        Care EveryWhere ID     This is your Care EveryWhere ID. This could be used by other organizations to access your [place] medical records  [id number]        Your Vitals Were     Pulse Temperature Pulse Oximetry BMI (Body Mass Index)          76 98.8  F (37.1  C) (Oral) 96% 44.61 kg/m2         Blood Pressure from Last 3 Encounters:   10/04/18 117/80   09/28/18 119/84   09/21/18 124/81    Weight from Last 3 Encounters:   10/04/18 276 lb 6.4 oz (125.4 kg)   09/28/18 274 lb 9.6 oz (124.6 kg)   09/21/18 278 lb 12.8 oz (126.5 kg)              We Performed the Following     DME REFERRAL        Primary Care Provider Office Phone #    Agueda Vera [phone]       [address] [place] [address], [address]  [facility] [address]        Equal Access to Services     MARAH MONTOYA : Donnie Diaz, ivan palacios, chantal mike. So [facility] [phone].    ATENCIÓN: Si  dorinda cabrera, tiene a barbour disposición servicios gratuitos de asistencia lingüística. Jolene good [phone].    We comply with applicable federal civil rights laws and [place] laws. We do not discriminate on the basis of race, color, national origin, age, disability, sex, sexual orientation, or gender identity.            Thank you!     Thank you for choosing [facility]  for your care. Our goal is always to provide you with excellent care. Hearing back from our patients is one way we can continue to improve our services. Please take a few minutes to complete the written survey that you may receive in the mail after your visit with us. Thank you!             Your Updated Medication List - Protect others around you: Learn how to safely use, store and throw away your medicines at www.disposemymeds.org.          This list is accurate as of 10/4/18  4:51 PM.  Always use your most recent med list.                   Brand Name Dispense Instructions for use Diagnosis    acetaminophen 500 MG tablet    TYLENOL     Take 500 mg by mouth        albuterol 108 (90 Base) MCG/ACT inhaler    PROAIR HFA/PROVENTIL HFA/VENTOLIN HFA     Inhale 2 puffs into the lungs        aspirin 81 MG tablet     100    1 TABLET DAILY    Hypothyroidism       Blood Pressure Monitor Misc      As directed. Dx 401.9        BUSPAR 5 MG tablet   Generic drug:  busPIRone     60 Tab    1 tab twice daily    Generalised anxiety disorder       calcium polycarbophil 625 MG tablet    FIBERCON    30 tablet    Take 2 tablets (1,250 mg) by mouth daily    Constipation, unspecified constipation type       CONTOUR NEXT TEST test strip   Generic drug:  blood glucose monitoring      USE TO TEST BLOOD GLUCOSE FOUR TIMES DAILY;USE TO TEST BLOOD GLUCOSE AS NEEDED        DOCOSAHEXAENOIC ACID PO      Take 1,000 mg by mouth        FISH OIL OMEGA-3 1000 MG Caps     90 capsule    Take 1,000 mg by mouth daily    Healthcare maintenance       hydrOXYzine 50 MG  capsule    VISTARIL     TAKE 1 CAP BY MOUTH TWICE A DAY        IRON PO           levonorgestrel 20 MCG/24HR IUD    MIRENA     1 Device by Intrauterine route        lisinopril 2.5 MG tablet    PRINIVIL/Zestril    30 tablet    Take 1 tablet (2.5 mg) by mouth daily    Benign essential hypertension       metFORMIN 500 MG tablet    GLUCOPHAGE     TAKE 2 TABS (1,000MG) BY MOUTH TWICE A DAY WITH MEALS        mineral oil-hydrophilic petrolatum      APPLY 1 4 MED CUP TO AFFECT ED AREA(S) DAILY AS NEEDED FOR 14 DAYS        * MOTRIN 800 MG tablet   Generic drug:  ibuprofen     90 Tab    ONE TABLET 3 TIMES A DAY WITH FOOD    PMS (premenstrual syndrome)       * ibuprofen 400 MG tablet    ADVIL/MOTRIN     Take 400 mg by mouth        nystatin ointment    MYCOSTATIN     Apply topically 2 times daily        order for DME     1 Device    Equipment being ordered: Digital home blood pressure monitor kit with cuff    Benign essential hypertension       order for DME     2 Device    Equipment being ordered: Knee Sleeve, both knees, dispense 2    Osteoarthritis of both knees, unspecified osteoarthritis type       prenatal multivitamin plus iron 27-0.8 MG Tabs per tablet     100 tablet    Take 1 tablet by mouth daily    Benign essential hypertension       PRENATAL VITAMIN TABS   OR     100    1 TABLET DAILY    Fatigue       * propranolol 10 MG tablet    INDERAL    60    1/2 tab up to 4 times daily    Generalised anxiety disorder       * propranolol 10 MG tablet    INDERAL    90 tablet    Take 1 tablet (10 mg) by mouth 3 times daily AS NEEDED for anxiety    ALONA (generalized anxiety disorder), Panic disorder without agoraphobia, Benign essential hypertension       SYNTHROID 50 MCG tablet   Generic drug:  levothyroxine     90    1 TABLET DAILY    Hypothyroidism       UNISTIK 3 COMFORT Misc      USE TO TEST BLOOD GLUCOSE FOUR TIMES DAILY;USE TO TEST BLOOD GLUCOSE AS NEEDED        Vitamin D (Cholecalciferol) 1000 units Tabs     90 tablet     Take 1,000 Units by mouth daily    History of vitamin D deficiency       * Notice:  This list has 4 medication(s) that are the same as other medications prescribed for you. Read the directions carefully, and ask your doctor or other care provider to review them with you.

## 2018-10-04 NOTE — PATIENT INSTRUCTIONS
Misty,    It was nice meeting you today. Here is a summary of our visit:    - We agree with your current plans.  - Keep up the positive work at [employer].  - It's okay to keep using the propranolol as a PRN for anxiety attacks.  - Though we cannot offer the Genesight testing, it would be reasonable to get it done in order to figure out which medications are less likely to give you side effects.  - We are not recommending starting a new medication today. Just keep on doing what you are doing ([employer] [facility], sobriety, exercise and healthy diet, your [employer] practices, using your sleep apnea machine, and paracording).  - We would recommend a bright light box. I will provide a prescription for this. Instructions are below.    It was a pleasure meeting you today. If you have further your questions, you are welcome to contact your PCP.    Thanks!    Katarzyna Boothe and Bass    Light Therapy Instructions:    INSTRUCTIONS FOR LIGHT BOX THERAPY    PLACE THE LIGHT BOX ON A TABLE OR COUNTER WHERE YOU CAN SIT COMFORTABLY.  FOLLOW THE 'S INFORMATION FOR THE DISTANCE TO THE LIGHT BOX.  YOU SHOULD NOT STARE DIRECTLY INTO THE LIGHT. YOU CAN READ OR EAT WHILE USING THE LIGHT. YOUR EYES MUST BE OPEN.  START WITH 30 MINUTES OF LIGHT EXPOSURE AS SOON AS POSSIBLE AFTER WAKING ( BETWEEN 6AM AND 9 AM).  MOST PEOPLE USE LIGHT THERAPY THROUGH THE WINTER UNTIL SPRINGTIME.    WHEN TO EXPECT A RESPONSE    YOU SHOULD NOTICE A RESPONSE IN A FEW DAYS AND BY TWO WEEKS DEFINITE IMPROVEMENT.   WHEN LIGHT THERAPY IS STOPPED IT MAY BE A FEW DAYS BEFORE SYMPTOMS REAPPEAR.    WHAT TO DO IF IMPROVEMENT IS NOT NOTICED    IF NO IMPROVEMENT AFTER 10 TO 14 DAYS, TRY SPENDING UP TO 60 MINUTES PER DAY IN FRONT OF THE LIGHT EVERY MORNING OR DIVIDING THE TIME BETWEEN MORNING AND EVENING. DO NOT USE CLOSE TO BEDTIME.    Psychiatry Outpatient Clinic     [place] [address] [place] [address]  Floor 2, [address]  [facility]  [place] [address]     Map and Directions    Hours: M-F (8:00 AM--5:00 PM)  Evening hours only available as arranged by your provider     Appointments:  [phone]

## 2018-10-04 NOTE — PROGRESS NOTES
[facility]  PSYCHIATRY OUTPATIENT CONSULT      Misty Girard is a 48 year old female who prefers the name Misty.    Pt seen by:  Dr. Blanton and Dr. Boothe   Referred by PCP:  Agueda Vera  Referral Question:  Make recommendations for anxiety, gene testing, and medication.  History Provided by:  patient who was an expansive, but at times hard to follow historian.   We spent 50 minutes face to face time with the pt, greater than 50% in counseling and care coordination.      DIAGNOSES         Methamphetamine Use Disorder, in early remission   - 25 years of consistent use  Unspecified Anxiety Disorder  R/o Personality Disorder, cluster B traits  Recurrent MDD, unspec severity     ASSESSMENT        Misty Girard is a 49yo  female with a history of methamphetamine use disorder who presents for consultation regarding anxiety and gene testing. She reports she has been sober 200+ days and is in fact doing quite well in terms of mood and anxiety. She endorses healthy anxiety symptoms in the context of methamphetamine use (this has improved with sobriety), but otherwise denies symptoms of jeannette/hypomania, psychosis, and depression.. She is currently participating in court-ordered CD treatment at [facility]. She has found the [employer] components of their program quite helpful and has enjoyed a structure of prayer, regular exercise, healthy diet, and arts/crafts as major components of her recovery. She is not interested in starting a medication today, but historically has felt sensitive to side effects and so is interested in obtaining Genesight testing. This would not be unreasonable, but it is not something we do at this clinic.    In interview, Misty presented as what could best be described as affectively dysregulated and cognitively disinhibited. She lept from one emotion to the next with surprising frequency, in a breath laughing and then crying. She was both engaged  "with the interview and then at times guarded. She frequently made self-referential comments about \"staying on track.\" She also described herself as \"hilarious\" and yet at times would make objectively humorous remarks and then be surprised by a response of laughter. It is worth noting that she was pregnant and  by age 15, and kicked out of the house, and yet she felt she had a \"normal and loving\" childhood with supportive parents. There are clear developmental issues at play here (which spiraled into a 25 year history of chronic daily methamphetamine use), but given the limited nature of this encounter, it would be impossible to do more than speculate on specific diagnoses.     At present, she states she is doing well, and so we encouraged her to continue what she has been doing for herself over the last several weeks. Ideally, she would establish with a long-term psychotherapist and/or psychiatrist. She expressed an interest in at some point becoming a patient a the [facility], and so contact information was provided.    TREATMENT DISCUSSION:   Pharmacotherapy and Drug Interactions- N/A.   Psychotherapy- Currently participating in CD treatment. May need assistance finding new therapist at conclusion of [employer].   Follow-up- As needed     RECOMMENDATIONS                                  1) MEDICATION:  [after today, all med related issues should be directed to PCP]  - Continue PRN propranolol for anxiety episodes    2) THERAPY:  Continue as discussed above    3) FOLLOW-UP: with PCP PRN    4) OTHER:  Pt provided contact info for [facility] psychiatry    5) CRISIS NUMBERS:   None today. If needed in the future, would give:  [facility] - [phone]  COPE 24/7 [place] Mobile Team for Adults [[phone]];  Child [[phone]]    [facility] - [phone]  Urgent Care Adult Mental Health: Drop-in, 24/7 crisis line and [place] Mobile Team [[phone]]   CRISIS TEXT " "LINE: Text 741-283 from anywhere, anytime, any crisis 24/7;  OR SEE www.crisistextline.org     CHIEF COMPLAINT                                           \" 25 years of meth \"     HISTORY OF PRESENT ILLNESS                                           Pertinent Background:  Patient [place] and began using cocaine in late teens. Started meth in early twenties. She has 3 children, one of whom is in foster care. She was [facility] from 2005 to [facility] for [facility]-related offenses and is under provisional release presuming she continues with CD treatment.    Most recent history    Currently at [employer]  \"Found [employer]\"  Used to ruminate about health all the time. \"Always worried I'd have a heart attack.\" Family h/o MI.   \"Suffer from anxiety and panic attacks\", sometimes will lock self in her room. Last anxiety \"attack\" was maybe 3 weeks ago.  Used to cry at inappropriate times  [facility] [phone] for drug-related offenses  Child taken away from her that she gave birth to while in snf  Used to smoke meth  Prefers homeopathic oils, electrical stimulation (vis a vis transcranial direct current stim)  Interested in \"happy light\" (Bright Light Therapy box)  Feels she's doing pretty well last few weeks. Improvement in IADLs, social interactions.  Has lost 7.5lbs in last few weeks  Exercising daily; focusing on prayer, health, phys/emot safety  Sober 205 days  Meth since 1993. Quarter gram daily.   Used to self medicate anxiety  Smoked weed 2x in life  H/o crack cocaine use in early 2000s  Rarely drink  Has sleep apnea, getting used to it.   Recommended VLFX by other psychiatrist.  Starting use meth as way to attract a cam to her (bought a bunch of drugs to \"mayo\" him)    States she doesn't want to start a new med, but that she would like to get gene testing because she historically has been very sensitive to medications.    Recent Symptoms: " "  Depression:  denies  Elevated:  racing thoughts  Psychosis:  none  Anxiety:  nervous/overwhelmed  Panic Attack:  has period \"anxiety attacks\", but these have improved lately and she uses PRN propranolol with good effects  PERTINENT NEGATIVE Sxs:  suicidal ideation, self-injurious behavior/urges, violent ideation, aggression, psychosis and jeannette    Recent Substance Use  none reported    SUBSTANCE USE HISTORY                   [employer] x 2    PSYCHIATRIC HISTORY     Has been previously diagnosed with Borderline Personality Disorders. Med trials below. Denies h/o psychiatric admissions and suicide attempts, \"I love life.\"    LTG  Hydrox - quit,   Buspar  Propranolol    SOCIAL/ FAMILY HISTORY                [per patient report]                                  Maternal - SZ, SA  Paternal - AUD  Brother - \"issues\"    Dad was a . First kid at age 15.  the father, but then he started cheating on her when she was 16yo.    MEDICAL / SURGICAL HISTORY           Patient Active Problem List   Diagnosis     Generalized anxiety disorder     Secondary dysmenorrhea     Fatigue     Hypothyroidism     CARDIOVASCULAR SCREENING; LDL GOAL LESS THAN 160     Methamphetamine use (H)     Controlled type 2 diabetes mellitus without complication, without long-term current use of insulin (H)     Borderline personality disorder (H)     ACP (advance care planning)     Diabetic peripheral neuropathy (H)     Family history of coronary artery disease     History of tobacco abuse     Hypertension, essential, benign     Hypertriglyceridemia     Morbid obesity with BMI of 40.0-44.9, adult (H)     Osteoarthritis of left knee     Panic disorder without agoraphobia     PTSD (post-traumatic stress disorder)     Vitamin D deficiency       No past surgical history on file.    MEDICAL REVIEW OF SYSTEMS                             A comprehensive review of systems was performed and is negative other than noted in the HPI.       ALLERGY     "                            Review of patient's allergies indicates no known allergies.   MEDICATIONS                                 Current Outpatient Prescriptions   Medication Sig Dispense Refill     acetaminophen (TYLENOL) 500 MG tablet Take 500 mg by mouth       albuterol (PROAIR HFA/PROVENTIL HFA/VENTOLIN HFA) 108 (90 Base) MCG/ACT inhaler Inhale 2 puffs into the lungs       ASPIRIN 81 MG OR TABS 1 TABLET DAILY 100 3     Blood Pressure Monitor MISC As directed. Dx 401.9       BUSPAR 5 MG OR TABS 1 tab twice daily (Patient not taking: No sig reported) 60 Tab 5     calcium polycarbophil (FIBERCON) 625 MG tablet Take 2 tablets (1,250 mg) by mouth daily 30 tablet 3     CONTOUR NEXT TEST test strip USE TO TEST BLOOD GLUCOSE FOUR TIMES DAILY;USE TO TEST BLOOD GLUCOSE AS NEEDED  99     DOCOSAHEXAENOIC ACID PO Take 1,000 mg by mouth       hydrOXYzine (VISTARIL) 50 MG capsule TAKE 1 CAP BY MOUTH TWICE A DAY  99     ibuprofen (ADVIL/MOTRIN) 400 MG tablet Take 400 mg by mouth       IRON PO        Lancets Misc. (UNISTIK 3 COMFORT) MISC USE TO TEST BLOOD GLUCOSE FOUR TIMES DAILY;USE TO TEST BLOOD GLUCOSE AS NEEDED  99     levonorgestrel (MIRENA) 20 MCG/24HR IUD 1 Device by Intrauterine route       lisinopril (PRINIVIL/ZESTRIL) 2.5 MG tablet Take 1 tablet (2.5 mg) by mouth daily 30 tablet 3     metFORMIN (GLUCOPHAGE) 500 MG tablet TAKE 2 TABS (1,000MG) BY MOUTH TWICE A DAY WITH MEALS  99     mineral oil-hydrophilic petrolatum (AQUAPHOR) APPLY 1 4 MED CUP TO AFFECT ED AREA(S) DAILY AS NEEDED FOR 14 DAYS  0     MOTRIN 800 MG OR TABS ONE TABLET 3 TIMES A DAY WITH FOOD 90 Tab 0     nystatin (MYCOSTATIN) ointment Apply topically 2 times daily       Omega-3 Fatty Acids (FISH OIL OMEGA-3) 1000 MG CAPS Take 1,000 mg by mouth daily 90 capsule 3     order for DME Equipment being ordered: Knee Sleeve, both knees, dispense 2 2 Device 0     order for DME Equipment being ordered: Digital home blood pressure monitor kit with cuff 1 Device  0     Prenatal Vit-Fe Fumarate-FA (PRENATAL MULTIVITAMIN PLUS IRON) 27-0.8 MG TABS per tablet Take 1 tablet by mouth daily 100 tablet 3     PRENATAL VITAMIN TABS   OR 1 TABLET DAILY 100 3     propranolol (INDERAL) 10 MG tablet Take 1 tablet (10 mg) by mouth 3 times daily AS NEEDED for anxiety 90 tablet 1     PROPRANOLOL HCL 10 MG OR TABS 1/2 tab up to 4 times daily 60 5     SYNTHROID 50 MCG OR TABS 1 TABLET DAILY (Patient not taking: No sig reported) 90 3     Vitamin D, Cholecalciferol, 1000 units TABS Take 1,000 Units by mouth daily 90 tablet 3     VITALS   /80  Pulse 76  Temp 98.8  F (37.1  C) (Oral)  Wt 276 lb 6.4 oz (125.4 kg)  SpO2 96%  BMI 44.61 kg/m2   MENTAL STATUS EXAM        Alertness: alert  and oriented  Appearance: well groomed  Behavior/Demeanor: cooperative, with good  eye contact   Speech: increased rate  Language: intact  Psychomotor: fidgety and rocking back in forth in her chair  Mood: description consistent with euthymia  Affect: full range and labile; disinhibited, dysregulated and was not congruent to mood; was congruent to content  Thought Process/Associations: overinclusive  and difficult to follow  Thought Content:  Reports none;  Denies suicidal ideation, delusions and paranoid ideation  Perception:  Reports none;  Denies auditory hallucinations and visual hallucinations  Insight: adequate  Judgment: adequate for safety  Cognition: (6) does  appear grossly intact; formal cognitive testing was not done  Gait and Station: unremarkable    LABS and DATA     PHQ9 TODAY = not completed     STATEMENTS REGARDING TREATMENT RISK and CONSULT PROCESS      TREATMENT RISK STATEMENT:  The risks, benefits, alternatives and potential adverse effects have been explained and are understood by the pt. The pt agrees to the treatment plan with the ability to do so. The pt knows to call the clinic for any problems or to access emergency care if needed.  Medical and CD concerns are documented above.   Psychotropic drug interaction check was done, including changes made today, and is discussed above.     STATEMENT REGARDING CONSULT:  Intervention decisions emerging from this consult will be either made by the PCP or initiated today in agreement with PCP.  Note, this is a one time consult only.  No psychiatry follow-up will be provided. PCP is encouraged to contact this consultant if future assistance is desired.    COUNSELING AND COORDINATION OF CARE CONSISTED OF:  Diagnosis, impressions, risk and benefits of treatment options, instructions for treatment and follow up and plan for additional supporting services.                                                  RESIDENT PHYSICIAN:     Dr. Boothe  ATTENDING PHYSICIAN:  Dr. Blanton  I, Dr Boothe, am scribing for and in the presence of Dr. Helen Blanton, attending.  I, Dr. Blanton as the attending doctor, have reviewed and edited the documentation recorded by Dr Boothe.    The documentation accurately reflects the services I personally performed and the treatment decisions made by me.   MD Margarette

## 2018-10-08 ENCOUNTER — TELEPHONE (OUTPATIENT)
Dept: FAMILY MEDICINE | Facility: CLINIC | Age: 48
End: 2018-10-08

## 2018-10-08 DIAGNOSIS — E11.42 DIABETIC PERIPHERAL NEUROPATHY (H): ICD-10-CM

## 2018-10-08 DIAGNOSIS — L85.3 DRY SKIN: Primary | ICD-10-CM

## 2018-10-08 NOTE — TELEPHONE ENCOUNTER
[facility] phone call message - order or referral request from patient:     Order or referral being requested: Requested order [employer]  Additional Details: Patient would like a DME order for the above and have it sent to [facility] to leave a message on voice mail? Yes    Primary language: English      needed? No    Call taken on October 8, 2018 at 4:07 PM by Comfort Sneed    Order request route to [facility] TRIAGE   Referrals Route to [facility] (Green/[place]/Purple) CARE COORDINATOR

## 2018-10-08 NOTE — TELEPHONE ENCOUNTER
"Patient is calling because she states she needs the order to say \"3\" knee sleeves, not 2. This is according to what [facility] told her. Please update and fax to [place]   "

## 2018-10-09 DIAGNOSIS — M17.0 OSTEOARTHRITIS OF BOTH KNEES, UNSPECIFIED OSTEOARTHRITIS TYPE: ICD-10-CM

## 2018-10-09 NOTE — TELEPHONE ENCOUNTER
Order updated, printed, signed and placed in PCS folder to fax to [facility].    Carlene Mccallum MD  Family Medicine PGY3 Resident

## 2018-10-18 ENCOUNTER — OFFICE VISIT (OUTPATIENT)
Dept: FAMILY MEDICINE | Facility: CLINIC | Age: 48
End: 2018-10-18
Payer: COMMERCIAL

## 2018-10-18 VITALS
WEIGHT: 276 LBS | BODY MASS INDEX: 44.55 KG/M2 | TEMPERATURE: 99.2 F | SYSTOLIC BLOOD PRESSURE: 127 MMHG | OXYGEN SATURATION: 97 % | DIASTOLIC BLOOD PRESSURE: 84 MMHG | HEART RATE: 78 BPM

## 2018-10-18 DIAGNOSIS — L85.3 DRY SKIN: ICD-10-CM

## 2018-10-18 DIAGNOSIS — F41.0 PANIC DISORDER WITHOUT AGORAPHOBIA: ICD-10-CM

## 2018-10-18 DIAGNOSIS — Z86.39 HISTORY OF VITAMIN D DEFICIENCY: ICD-10-CM

## 2018-10-18 DIAGNOSIS — F41.1 GAD (GENERALIZED ANXIETY DISORDER): ICD-10-CM

## 2018-10-18 DIAGNOSIS — J45.909 REACTIVE AIRWAY DISEASE WITHOUT COMPLICATION, UNSPECIFIED ASTHMA SEVERITY, UNSPECIFIED WHETHER PERSISTENT: ICD-10-CM

## 2018-10-18 DIAGNOSIS — E11.9 TYPE 2 DIABETES MELLITUS WITHOUT COMPLICATION, WITHOUT LONG-TERM CURRENT USE OF INSULIN (H): ICD-10-CM

## 2018-10-18 DIAGNOSIS — E11.42 DIABETIC PERIPHERAL NEUROPATHY (H): ICD-10-CM

## 2018-10-18 DIAGNOSIS — Z00.00 HEALTHCARE MAINTENANCE: ICD-10-CM

## 2018-10-18 DIAGNOSIS — I10 BENIGN ESSENTIAL HYPERTENSION: ICD-10-CM

## 2018-10-18 DIAGNOSIS — M71.20 SYNOVIAL CYST OF KNEE, UNSPECIFIED LATERALITY: Primary | ICD-10-CM

## 2018-10-18 DIAGNOSIS — K59.00 CONSTIPATION, UNSPECIFIED CONSTIPATION TYPE: ICD-10-CM

## 2018-10-18 RX ORDER — PNV NO.95/FERROUS FUM/FOLIC AC 28MG-0.8MG
1000 TABLET ORAL DAILY
Qty: 90 CAPSULE | Refills: 3 | Status: SHIPPED | OUTPATIENT
Start: 2018-10-18 | End: 2019-03-22

## 2018-10-18 RX ORDER — ACETAMINOPHEN 500 MG
1000 TABLET ORAL EVERY 8 HOURS PRN
Qty: 100 TABLET | Refills: 3 | Status: SHIPPED | OUTPATIENT
Start: 2018-10-18 | End: 2018-11-15

## 2018-10-18 RX ORDER — LISINOPRIL 2.5 MG/1
1.25 TABLET ORAL DAILY
Qty: 60 TABLET | Refills: 1 | Status: SHIPPED | OUTPATIENT
Start: 2018-10-18 | End: 2018-11-21

## 2018-10-18 RX ORDER — PRENATAL VIT/IRON FUM/FOLIC AC 27MG-0.8MG
1 TABLET ORAL DAILY
Qty: 100 TABLET | Refills: 3 | Status: SHIPPED | OUTPATIENT
Start: 2018-10-18 | End: 2019-05-06

## 2018-10-18 RX ORDER — PROPRANOLOL HYDROCHLORIDE 10 MG/1
10 TABLET ORAL 3 TIMES DAILY PRN
Qty: 90 TABLET | Refills: 3 | Status: SHIPPED | OUTPATIENT
Start: 2018-10-18 | End: 2018-11-15

## 2018-10-18 RX ORDER — CALCIUM POLYCARBOPHIL 625 MG 625 MG/1
2 TABLET ORAL DAILY
Qty: 180 TABLET | Refills: 3 | Status: SHIPPED | OUTPATIENT
Start: 2018-10-18 | End: 2019-05-06

## 2018-10-18 RX ORDER — INHALER, ASSIST DEVICES
SPACER (EA) MISCELLANEOUS
Qty: 1 EACH | Refills: 0 | Status: SHIPPED | OUTPATIENT
Start: 2018-10-18

## 2018-10-18 RX ORDER — ALBUTEROL SULFATE 90 UG/1
2 AEROSOL, METERED RESPIRATORY (INHALATION) EVERY 4 HOURS PRN
Qty: 2 INHALER | Refills: 3 | Status: SHIPPED | OUTPATIENT
Start: 2018-10-18 | End: 2019-12-06

## 2018-10-18 RX ORDER — MULTIVIT-MIN/IRON/FOLIC ACID/K 18-600-40
1000 CAPSULE ORAL DAILY
Qty: 90 TABLET | Refills: 11 | Status: SHIPPED | OUTPATIENT
Start: 2018-10-18 | End: 2019-03-22

## 2018-10-18 NOTE — PATIENT INSTRUCTIONS
Here is the plan from today's visit    1. Constipation, unspecified constipation type  - calcium polycarbophil (FIBERCON) 625 MG tablet; Take 2 tablets (1,250 mg) by mouth daily  Dispense: 180 tablet; Refill: 3    2. Benign essential hypertension  - lisinopril (PRINIVIL/ZESTRIL) 2.5 MG tablet; Take 0.5 tablets (1.25 mg) by mouth daily  Dispense: 60 tablet; Refill: 1  - order for DME; Equipment being ordered: Digital home blood pressure monitor kit with cuff  Dispense: 1 Device; Refill: 0  - Prenatal Vit-Fe Fumarate-FA (PRENATAL MULTIVITAMIN PLUS IRON) 27-0.8 MG TABS per tablet; Take 1 tablet by mouth daily  Dispense: 100 tablet; Refill: 3  - propranolol (INDERAL) 10 MG tablet; Take 1 tablet (10 mg) by mouth 3 times daily as needed (anxiety)  Dispense: 90 tablet; Refill: 3    3. Healthcare maintenance  - Omega-3 Fatty Acids (FISH OIL OMEGA-3) 1000 MG CAPS; Take 1,000 mg by mouth daily  Dispense: 90 capsule; Refill: 3    4. Dry skin  - order for DME; Equipment being ordered: Aquaphor cream jar  Dispense: 2 Container; Refill: 20    5. Diabetic peripheral neuropathy (H)  - order for DME; Equipment being ordered: Aquaphor cream jar  Dispense: 2 Container; Refill: 20  - metFORMIN (GLUCOPHAGE) 500 MG tablet; Take 2 tablets (1,000 mg) by mouth 2 times daily (with meals)  Dispense: 360 tablet; Refill: 1    6. ALONA (generalized anxiety disorder)  - propranolol (INDERAL) 10 MG tablet; Take 1 tablet (10 mg) by mouth 3 times daily as needed (anxiety)  Dispense: 90 tablet; Refill: 3  - order for DME; Equipment being ordered: Full spectrum 10,000 lux light box (Procedure code )  Will use 30 min every morning and afternoon in fall and winter months.  Dispense: 1 Units; Refill: 0    7. Panic disorder without agoraphobia  - propranolol (INDERAL) 10 MG tablet; Take 1 tablet (10 mg) by mouth 3 times daily as needed (anxiety)  Dispense: 90 tablet; Refill: 3    8. History of vitamin D deficiency  - Vitamin D, Cholecalciferol, 1000  units TABS; Take 1,000 Units by mouth daily  Dispense: 90 tablet; Refill: 11    9. Synovial cyst of knee, unspecified laterality  - acetaminophen (TYLENOL) 500 MG tablet; Take 2 tablets (1,000 mg) by mouth every 8 hours as needed for mild pain  Dispense: 100 tablet; Refill: 3    10. Reactive airway disease without complication, unspecified asthma severity, unspecified whether persistent  - albuterol (PROAIR HFA/PROVENTIL HFA/VENTOLIN HFA) 108 (90 Base) MCG/ACT inhaler; Inhale 2 puffs into the lungs every 4 hours as needed for shortness of breath / dyspnea or wheezing  Dispense: 2 Inhaler; Refill: 3  - Spirometry Pre/Post (Bronchodilation Response); Future  - spacer (OPTICHAMBER GENARO) holding chamber; Holding/spacer device to use with inhaler.  Dispense: 1 each; Refill: 0    11. Type 2 diabetes mellitus without complication, without long-term current use of insulin (H)  - aspirin 81 MG tablet; Take 1 tablet (81 mg) by mouth daily  Dispense: 100 tablet; Refill: 3  - blood glucose (NO BRAND SPECIFIED) lancets standard; Use to test blood sugar 1-2 times daily or as directed.  Dispense: 100 each; Refill: 11  - blood glucose monitoring (NO BRAND SPECIFIED) test strip; Use to test blood sugars 2 times daily or as directed  Dispense: 100 strip; Refill: 11      Please call or return to clinic if your symptoms don't go away.    Follow up plan    Please make a clinic appointment for follow up with your primary physician Agueda Vera in 6 months or as needed    Thank you for coming to [facility] today.  Lab Testing:  **If you had lab testing today and your results are reassuring or normal they will be mailed to you or sent through [facility] within 7 days.   **If the lab tests need quick action we will call you with the results.  The phone number we will call with results is # [phone] (home) . If this is not the best number please call our clinic and change the number.  Medication Refills:  If you need any  refills please call your pharmacy and they will contact us.   If you need to  your refill at a new pharmacy, please contact the new pharmacy directly. The new pharmacy will help you get your medications transferred faster.   Scheduling:  If you have any concerns about today's visit or wish to schedule another appointment please call our office during normal business hours [phone] (8-5:00 M-F)  If a referral was made to a [facility] and you don't get a call from central scheduling please call [phone].  If a Mammogram was ordered for you at [facility] call [phone] to schedule or change your appointment.  If you had an XRay/CT/Ultrasound/MRI ordered the number is [phone] to schedule or change your radiology appointment.   Medical Concerns:  If you have urgent medical concerns please call [phone] at any time of the day.    Tahir Armstrong MD

## 2018-10-18 NOTE — MR AVS SNAPSHOT
After Visit Summary   10/18/2018    Misty Girard    MRN: [id number]           Patient Information     Date Of Birth          1970        Visit Information        Provider Department      10/18/2018 4:20 PM Tahir Armstrong MD [facility]        Today's Diagnoses     Synovial cyst of knee, unspecified laterality    -  1    Constipation, unspecified constipation type        Benign essential hypertension        Healthcare maintenance        Dry skin        Diabetic peripheral neuropathy (H)        ALONA (generalized anxiety disorder)        Panic disorder without agoraphobia        History of vitamin D deficiency        Reactive airway disease without complication, unspecified asthma severity, unspecified whether persistent        Type 2 diabetes mellitus without complication, without long-term current use of insulin (H)          Care Instructions    Here is the plan from today's visit    1. Constipation, unspecified constipation type  - calcium polycarbophil (FIBERCON) 625 MG tablet; Take 2 tablets (1,250 mg) by mouth daily  Dispense: 180 tablet; Refill: 3    2. Benign essential hypertension  - lisinopril (PRINIVIL/ZESTRIL) 2.5 MG tablet; Take 0.5 tablets (1.25 mg) by mouth daily  Dispense: 60 tablet; Refill: 1  - order for DME; Equipment being ordered: Digital home blood pressure monitor kit with cuff  Dispense: 1 Device; Refill: 0  - Prenatal Vit-Fe Fumarate-FA (PRENATAL MULTIVITAMIN PLUS IRON) 27-0.8 MG TABS per tablet; Take 1 tablet by mouth daily  Dispense: 100 tablet; Refill: 3  - propranolol (INDERAL) 10 MG tablet; Take 1 tablet (10 mg) by mouth 3 times daily as needed (anxiety)  Dispense: 90 tablet; Refill: 3    3. Healthcare maintenance  - Omega-3 Fatty Acids (FISH OIL OMEGA-3) 1000 MG CAPS; Take 1,000 mg by mouth daily  Dispense: 90 capsule; Refill: 3    4. Dry skin  - order for DME; Equipment being ordered: Aquaphor cream jar  Dispense: 2 Container; Refill:  20    5. Diabetic peripheral neuropathy (H)  - order for DME; Equipment being ordered: Aquaphor cream jar  Dispense: 2 Container; Refill: 20  - metFORMIN (GLUCOPHAGE) 500 MG tablet; Take 2 tablets (1,000 mg) by mouth 2 times daily (with meals)  Dispense: 360 tablet; Refill: 1    6. ALONA (generalized anxiety disorder)  - propranolol (INDERAL) 10 MG tablet; Take 1 tablet (10 mg) by mouth 3 times daily as needed (anxiety)  Dispense: 90 tablet; Refill: 3  - order for DME; Equipment being ordered: Full spectrum 10,000 lux light box (Procedure code )  Will use 30 min every morning and afternoon in fall and winter months.  Dispense: 1 Units; Refill: 0    7. Panic disorder without agoraphobia  - propranolol (INDERAL) 10 MG tablet; Take 1 tablet (10 mg) by mouth 3 times daily as needed (anxiety)  Dispense: 90 tablet; Refill: 3    8. History of vitamin D deficiency  - Vitamin D, Cholecalciferol, 1000 units TABS; Take 1,000 Units by mouth daily  Dispense: 90 tablet; Refill: 11    9. Synovial cyst of knee, unspecified laterality  - acetaminophen (TYLENOL) 500 MG tablet; Take 2 tablets (1,000 mg) by mouth every 8 hours as needed for mild pain  Dispense: 100 tablet; Refill: 3    10. Reactive airway disease without complication, unspecified asthma severity, unspecified whether persistent  - albuterol (PROAIR HFA/PROVENTIL HFA/VENTOLIN HFA) 108 (90 Base) MCG/ACT inhaler; Inhale 2 puffs into the lungs every 4 hours as needed for shortness of breath / dyspnea or wheezing  Dispense: 2 Inhaler; Refill: 3  - Spirometry Pre/Post (Bronchodilation Response); Future  - spacer (OPTICHAMBER GENARO) holding chamber; Holding/spacer device to use with inhaler.  Dispense: 1 each; Refill: 0    11. Type 2 diabetes mellitus without complication, without long-term current use of insulin (H)  - aspirin 81 MG tablet; Take 1 tablet (81 mg) by mouth daily  Dispense: 100 tablet; Refill: 3  - blood glucose (NO BRAND SPECIFIED) lancets standard; Use to  test blood sugar 1-2 times daily or as directed.  Dispense: 100 each; Refill: 11  - blood glucose monitoring (NO BRAND SPECIFIED) test strip; Use to test blood sugars 2 times daily or as directed  Dispense: 100 strip; Refill: 11      Please call or return to clinic if your symptoms don't go away.    Follow up plan    Please make a clinic appointment for follow up with your primary physician Agueda Vera in 6 months or as needed    Thank you for coming to [facility] today.  Lab Testing:  **If you had lab testing today and your results are reassuring or normal they will be mailed to you or sent through [facility] within 7 days.   **If the lab tests need quick action we will call you with the results.  The phone number we will call with results is # [phone] (home) . If this is not the best number please call our clinic and change the number.  Medication Refills:  If you need any refills please call your pharmacy and they will contact us.   If you need to  your refill at a new pharmacy, please contact the new pharmacy directly. The new pharmacy will help you get your medications transferred faster.   Scheduling:  If you have any concerns about today's visit or wish to schedule another appointment please call our office during normal business hours [phone] (8-5:00 M-F)  If a referral was made to a [facility] and you don't get a call from central scheduling please call [phone].  If a Mammogram was ordered for you at [facility] call [phone] to schedule or change your appointment.  If you had an XRay/CT/Ultrasound/MRI ordered the number is [phone] to schedule or change your radiology appointment.   Medical Concerns:  If you have urgent medical concerns please call [phone] at any time of the day.    Tahir Armstrong MD            Follow-ups after your visit        Your next 10 appointments already scheduled     Nov 28, 2018  3:00 PM CST    (Arrive by 2:45 PM)   NEW DIABETES with Lise Loco MD   [facility] ([facility])    [address]  [address] [place] [id number] [phone]              Future tests that were ordered for you today     Open Future Orders        Priority Expected Expires Ordered    Spirometry Pre/Post (Bronchodilation Response) Routine  12/2/2018 10/18/2018            Who to contact     Please call your clinic at [phone] to:    Ask questions about your health    Make or cancel appointments    Discuss your medicines    Learn about your test results    Speak to your doctor            Additional Information About Your Visit        Care EveryWhere ID     This is your Care EveryWhere ID. This could be used by other organizations to access your [place] medical records  [id number]        Your Vitals Were     Pulse Temperature Pulse Oximetry BMI (Body Mass Index)          78 99.2  F (37.3  C) (Oral) 97% 44.55 kg/m2         Blood Pressure from Last 3 Encounters:   10/18/18 127/84   10/04/18 117/80   09/28/18 119/84    Weight from Last 3 Encounters:   10/18/18 276 lb (125.2 kg)   10/04/18 276 lb 6.4 oz (125.4 kg)   09/28/18 274 lb 9.6 oz (124.6 kg)                 Today's Medication Changes          These changes are accurate as of 10/18/18  5:25 PM.  If you have any questions, ask your nurse or doctor.               Start taking these medicines.        Dose/Directions    blood glucose lancets standard   Commonly known as:  no brand specified   Used for:  Type 2 diabetes mellitus without complication, without long-term current use of insulin (H)   Replaces:  UNISTIK 3 COMFORT Misc   Started by:  Tahir Armstrong MD        Use to test blood sugar 1-2 times daily or as directed.   Quantity:  100 each   Refills:  11       spacer holding chamber   Used for:  Reactive airway disease without complication, unspecified asthma severity, unspecified whether persistent    Started by:  Tahir Armstrong MD        Holding/spacer device to use with inhaler.   Quantity:  1 each   Refills:  0         These medicines have changed or have updated prescriptions.        Dose/Directions    acetaminophen 500 MG tablet   Commonly known as:  TYLENOL   This may have changed:    - how much to take  - when to take this  - reasons to take this   Used for:  Synovial cyst of knee, unspecified laterality   Changed by:  Tahir Armstrong MD        Dose:  1000 mg   Take 2 tablets (1,000 mg) by mouth every 8 hours as needed for mild pain   Quantity:  100 tablet   Refills:  3       albuterol 108 (90 Base) MCG/ACT inhaler   Commonly known as:  PROAIR HFA/PROVENTIL HFA/VENTOLIN HFA   This may have changed:    - when to take this  - reasons to take this   Used for:  Reactive airway disease without complication, unspecified asthma severity, unspecified whether persistent   Changed by:  Tahir Armstrong MD        Dose:  2 puff   Inhale 2 puffs into the lungs every 4 hours as needed for shortness of breath / dyspnea or wheezing   Quantity:  2 Inhaler   Refills:  3       aspirin 81 MG tablet   This may have changed:  See the new instructions.   Used for:  Type 2 diabetes mellitus without complication, without long-term current use of insulin (H)   Changed by:  Tahir Armstrong MD        Dose:  81 mg   Take 1 tablet (81 mg) by mouth daily   Quantity:  100 tablet   Refills:  3       blood glucose monitoring test strip   Commonly known as:  no brand specified   This may have changed:  See the new instructions.   Used for:  Type 2 diabetes mellitus without complication, without long-term current use of insulin (H)   Changed by:  Tahir Armstrong MD        Use to test blood sugars 2 times daily or as directed   Quantity:  100 strip   Refills:  11       lisinopril 2.5 MG tablet   Commonly known as:  PRINIVIL/Zestril   This may have changed:  how much to take   Used for:  Benign  essential hypertension   Changed by:  Tahir Armstrong MD        Dose:  1.25 mg   Take 0.5 tablets (1.25 mg) by mouth daily   Quantity:  60 tablet   Refills:  1       metFORMIN 500 MG tablet   Commonly known as:  GLUCOPHAGE   This may have changed:  See the new instructions.   Used for:  Diabetic peripheral neuropathy (H)   Changed by:  Tahir Armstrong MD        Dose:  1000 mg   Take 2 tablets (1,000 mg) by mouth 2 times daily (with meals)   Quantity:  360 tablet   Refills:  1       order for DME   This may have changed:  additional instructions   Used for:  Dry skin, Diabetic peripheral neuropathy (H)   Changed by:  Tahir Armstrong MD        Equipment being ordered: Aquaphor cream jar   Quantity:  2 Container   Refills:  20       order for DME   This may have changed:  additional instructions   Used for:  ALONA (generalized anxiety disorder)   Changed by:  Tahir Armstrong MD        Equipment being ordered: Full spectrum 10,000 lux light box (Procedure code ) Will use 30 min every morning and afternoon in fall and winter months.   Quantity:  1 Units   Refills:  0       propranolol 10 MG tablet   Commonly known as:  INDERAL   This may have changed:    - when to take this  - reasons to take this  - additional instructions  - Another medication with the same name was removed. Continue taking this medication, and follow the directions you see here.   Used for:  ALONA (generalized anxiety disorder), Panic disorder without agoraphobia, Benign essential hypertension   Changed by:  Tahir Armstrong MD        Dose:  10 mg   Take 1 tablet (10 mg) by mouth 3 times daily as needed (anxiety)   Quantity:  90 tablet   Refills:  3         Stop taking these medicines if you haven't already. Please contact your care team if you have questions.     Blood Pressure Monitor Misc   Stopped by:  Tahir Armstrong MD           BUSPAR 5 MG tablet   Generic drug:  busPIRone   Stopped by:   Tahir Armstrong MD           DOCOSAHEXAENOIC ACID PO   Stopped by:  Tahir Armstrong MD           hydrOXYzine 50 MG capsule   Commonly known as:  VISTARIL   Stopped by:  Tahir Armstrong MD           ibuprofen 400 MG tablet   Commonly known as:  ADVIL/MOTRIN   Stopped by:  Tahir Armstrong MD           IRON PO   Stopped by:  Tahir Armstrong MD           mineral oil-hydrophilic petrolatum   Stopped by:  Tahir Armstrong MD           MOTRIN 800 MG tablet   Generic drug:  ibuprofen   Stopped by:  Tahir Armstrong MD           PRENATAL VITAMIN TABS   OR   Stopped by:  Tahir Armstrong MD           SYNTHROID 50 MCG tablet   Generic drug:  levothyroxine   Stopped by:  Tahir Armstrong MD           UNISTIK 3 COMFORT Misc   Replaced by:  blood glucose lancets standard   Stopped by:  Tahir Armstrong MD                Where to get your medicines      These medications were sent to [facility] [place] [facility] - [place], [place] - [address]  [address], [place] [address]     Phone:  [phone]     acetaminophen 500 MG tablet    albuterol 108 (90 Base) MCG/ACT inhaler    aspirin 81 MG tablet    blood glucose lancets standard    blood glucose monitoring test strip    calcium polycarbophil 625 MG tablet    FISH OIL OMEGA-3 1000 MG Caps    lisinopril 2.5 MG tablet    metFORMIN 500 MG tablet    prenatal multivitamin plus iron 27-0.8 MG Tabs per tablet    propranolol 10 MG tablet    spacer holding chamber    Vitamin D (Cholecalciferol) 1000 units Tabs         Some of these will need a paper prescription and others can be bought over the counter.  Ask your nurse if you have questions.     Bring a paper prescription for each of these medications     order for DME    order for DME    order for DME                Primary Care Provider Office Phone #    Agueda Avery Chuy [phone]       [address]  [address]  [facility] [address]        Equal Access to Services     MARAH MONTOYA : Hadii aad ku hadyenyxavier Vanegenesis, waleeda luqadaha, qaybta kaalmamonica herring, chantal harding. So [facility] [phone].    ATENCIÓN: Si habla español, tiene a barbour disposición servicios gratuitos de asistencia lingüística. Jolene al [phone].    We comply with applicable federal civil rights laws and [place] laws. We do not discriminate on the basis of race, color, national origin, age, disability, sex, sexual orientation, or gender identity.            Thank you!     [facility] you [facility]  for your care. Our goal is always to provide you with excellent care. Hearing back from our patients is one way we can continue to improve our services. Please take a few minutes to complete the written survey that you may receive in the mail after your visit with us. Thank you!             Your Updated Medication List - Protect others around you: Learn how to safely use, store and throw away your medicines at www.disposemymeds.org.          This list is accurate as of 10/18/18  5:25 PM.  Always use your most recent med list.                   Brand Name Dispense Instructions for use Diagnosis    acetaminophen 500 MG tablet    TYLENOL    100 tablet    Take 2 tablets (1,000 mg) by mouth every 8 hours as needed for mild pain    Synovial cyst of knee, unspecified laterality       albuterol 108 (90 Base) MCG/ACT inhaler    PROAIR HFA/PROVENTIL HFA/VENTOLIN HFA    2 Inhaler    Inhale 2 puffs into the lungs every 4 hours as needed for shortness of breath / dyspnea or wheezing    Reactive airway disease without complication, unspecified asthma severity, unspecified whether persistent       aspirin 81 MG tablet     100 tablet    Take 1 tablet (81 mg) by mouth daily    Type 2 diabetes mellitus without complication, without long-term current use of insulin (H)       blood glucose lancets standard    no brand  specified    100 each    Use to test blood sugar 1-2 times daily or as directed.    Type 2 diabetes mellitus without complication, without long-term current use of insulin (H)       blood glucose monitoring test strip    no brand specified    100 strip    Use to test blood sugars 2 times daily or as directed    Type 2 diabetes mellitus without complication, without long-term current use of insulin (H)       calcium polycarbophil 625 MG tablet    FIBERCON    180 tablet    Take 2 tablets (1,250 mg) by mouth daily    Constipation, unspecified constipation type       FISH OIL OMEGA-3 1000 MG Caps     90 capsule    Take 1,000 mg by mouth daily    Healthcare maintenance       levonorgestrel 20 MCG/24HR IUD    MIRENA     1 Device by Intrauterine route        lisinopril 2.5 MG tablet    PRINIVIL/Zestril    60 tablet    Take 0.5 tablets (1.25 mg) by mouth daily    Benign essential hypertension       metFORMIN 500 MG tablet    GLUCOPHAGE    360 tablet    Take 2 tablets (1,000 mg) by mouth 2 times daily (with meals)    Diabetic peripheral neuropathy (H)       nystatin ointment    MYCOSTATIN     Apply topically 2 times daily        order for DME     1 Device    Equipment being ordered: Digital home blood pressure monitor kit with cuff    Benign essential hypertension       order for DME     2 Container    Equipment being ordered: Aquaphor cream jar    Dry skin, Diabetic peripheral neuropathy (H)       order for DME     1 Units    Equipment being ordered: Full spectrum 10,000 lux light box (Procedure code ) Will use 30 min every morning and afternoon in fall and winter months.    ALONA (generalized anxiety disorder)       prenatal multivitamin plus iron 27-0.8 MG Tabs per tablet     100 tablet    Take 1 tablet by mouth daily    Benign essential hypertension       propranolol 10 MG tablet    INDERAL    90 tablet    Take 1 tablet (10 mg) by mouth 3 times daily as needed (anxiety)    ALONA (generalized anxiety disorder), Panic  disorder without agoraphobia, Benign essential hypertension       spacer holding chamber     1 each    Holding/spacer device to use with inhaler.    Reactive airway disease without complication, unspecified asthma severity, unspecified whether persistent       Vitamin D (Cholecalciferol) 1000 units Tabs     90 tablet    Take 1,000 Units by mouth daily    History of vitamin D deficiency

## 2018-10-18 NOTE — PROGRESS NOTES
Preceptor Attestation:   Patient seen, evaluated and discussed with the resident. I have verified the content of the note, which accurately reflects my assessment of the patient and the plan of care.   Supervising Physician:  Rusesll Ventura MD

## 2018-10-19 ASSESSMENT — ENCOUNTER SYMPTOMS
CONSTIPATION: 0
NAUSEA: 0
SORE THROAT: 0
CHILLS: 0
RHINORRHEA: 0
FEVER: 0
DIARRHEA: 0
VOMITING: 0
LIGHT-HEADEDNESS: 0
ABDOMINAL PAIN: 0
ARTHRALGIAS: 0
SHORTNESS OF BREATH: 0
COUGH: 0
MYALGIAS: 0
DYSURIA: 0
HEADACHES: 0

## 2018-10-19 NOTE — PROGRESS NOTES
HPI       Misty Girard is a 48 year old  who presents for   Chief Complaint   Patient presents with     Refill Request     metformin, Lancets, test strips-All Medications     Forms     Standing restrictions     Patient is a 48-year-old female currently in recovery with [place] [facility].  She presents today for follow-up for multiple medical concerns which are chronic and ongoing and mostly for medication and DME refills.  She says that she has reactive airway disease for which she uses an albuterol inhaler.  She says she has never had spirometry or formal pulmonary workup.  She has never used a spacer but does have concerns about the medication getting stuck in the back of her mouth.  Additionally she needs refills of her metformin and diabetic testing supplies.  She says that she ran out of metformin yesterday and her blood glucose check was in the 260s.  She subsequently got very anxious and got onto a treadmill to help the blood glucose and it subsequently came down to 140.  She says she is currently testing her blood sugars 4 times a day despite the fact that she is on single agent diabetic oral therapy.    Patient is also on lisinopril for blood pressure control.  She says that when she takes the 2.5 mg tablet she sometimes gets hypotensive and experiences lightheadedness, palpitations, fatigue.  She would like her prescription to be changed to 1.25 mg as this is what she had taken in the past and seemed to adequately control her blood pressure.  She says that her blood pressure is uncontrolled in the 160s systolic when she does not take any lisinopril.    She also needs refills of her baby aspirin, fiber,  Omega-3 fatty acids, prenatal vitamin, propranolol as needed for social anxiety, Synthroid, vitamin D, nystatin ointment, Aquaphor, blood pressure monitor, and light box for seasonal affective disorder             +++++++    Problem, Medication and Allergy Lists were reviewed and  updated if needed..    Patient is an established patient of this clinic..         Review of Systems:   Review of Systems   Constitutional: Negative for chills and fever.   HENT: Negative for rhinorrhea and sore throat.    Respiratory: Negative for cough and shortness of breath.    Cardiovascular: Negative for chest pain.   Gastrointestinal: Negative for abdominal pain, constipation, diarrhea, nausea and vomiting.   Genitourinary: Negative for dysuria.   Musculoskeletal: Negative for arthralgias and myalgias.   Neurological: Negative for light-headedness and headaches.            Physical Exam:     Vitals:    10/18/18 1626   BP: 127/84   Pulse: 78   Temp: 99.2  F (37.3  C)   TempSrc: Oral   SpO2: 97%   Weight: 276 lb (125.2 kg)     Body mass index is 44.55 kg/(m^2).  Vitals were reviewed and were normal     Physical Exam   Constitutional: She is oriented to person, place, and time. She appears well-developed and well-nourished. No distress.   obese   HENT:   Head: Normocephalic.   Eyes: Conjunctivae are normal.   Neck: Neck supple.   Cardiovascular: Normal rate.    Pulmonary/Chest: Effort normal. No respiratory distress.   Musculoskeletal: She exhibits no deformity.   Neurological: She is alert and oriented to person, place, and time.   Skin: Skin is warm and dry.   Psychiatric:   Anxious         Results:       Assessment and Plan          1. Constipation, unspecified constipation type  - calcium polycarbophil (FIBERCON) 625 MG tablet; Take 2 tablets (1,250 mg) by mouth daily  Dispense: 180 tablet; Refill: 3    2. Benign essential hypertension  - lisinopril (PRINIVIL/ZESTRIL) 2.5 MG tablet; Take 0.5 tablets (1.25 mg) by mouth daily  Dispense: 60 tablet; Refill: 1  - order for DME; Equipment being ordered: Digital home blood pressure monitor kit with cuff  Dispense: 1 Device; Refill: 0  - Prenatal Vit-Fe Fumarate-FA (PRENATAL MULTIVITAMIN PLUS IRON) 27-0.8 MG TABS per tablet; Take 1 tablet by mouth daily  Dispense:  100 tablet; Refill: 3  - propranolol (INDERAL) 10 MG tablet; Take 1 tablet (10 mg) by mouth 3 times daily as needed (anxiety)  Dispense: 90 tablet; Refill: 3    3. Healthcare maintenance  - Omega-3 Fatty Acids (FISH OIL OMEGA-3) 1000 MG CAPS; Take 1,000 mg by mouth daily  Dispense: 90 capsule; Refill: 3    4. Dry skin  - order for DME; Equipment being ordered: Aquaphor cream jar  Dispense: 2 Container; Refill: 20    5. Diabetic peripheral neuropathy (H)  - order for DME; Equipment being ordered: Aquaphor cream jar  Dispense: 2 Container; Refill: 20  - metFORMIN (GLUCOPHAGE) 500 MG tablet; Take 2 tablets (1,000 mg) by mouth 2 times daily (with meals)  Dispense: 360 tablet; Refill: 1    6. ALONA (generalized anxiety disorder)  - propranolol (INDERAL) 10 MG tablet; Take 1 tablet (10 mg) by mouth 3 times daily as needed (anxiety)  Dispense: 90 tablet; Refill: 3  - order for DME; Equipment being ordered: Full spectrum 10,000 lux light box (Procedure code )  Will use 30 min every morning and afternoon in fall and winter months.  Dispense: 1 Units; Refill: 0    7. Panic disorder without agoraphobia  - propranolol (INDERAL) 10 MG tablet; Take 1 tablet (10 mg) by mouth 3 times daily as needed (anxiety)  Dispense: 90 tablet; Refill: 3    8. History of vitamin D deficiency  - Vitamin D, Cholecalciferol, 1000 units TABS; Take 1,000 Units by mouth daily  Dispense: 90 tablet; Refill: 11    9. Synovial cyst of knee, unspecified laterality  - acetaminophen (TYLENOL) 500 MG tablet; Take 2 tablets (1,000 mg) by mouth every 8 hours as needed for mild pain  Dispense: 100 tablet; Refill: 3    10. Reactive airway disease without complication, unspecified asthma severity, unspecified whether persistent  - albuterol (PROAIR HFA/PROVENTIL HFA/VENTOLIN HFA) 108 (90 Base) MCG/ACT inhaler; Inhale 2 puffs into the lungs every 4 hours as needed for shortness of breath / dyspnea or wheezing  Dispense: 2 Inhaler; Refill: 3  - Spirometry  Pre/Post (Bronchodilation Response); Future  - spacer (OPTICHAMBER GENARO) holding chamber; Holding/spacer device to use with inhaler.  Dispense: 1 each; Refill: 0    11. Type 2 diabetes mellitus without complication, without long-term current use of insulin (H)    Discussed that the patient should not need to test blood glucose 4 times a day as she is only on single agent therapy with metformin twice daily.  Patient has significant anxiety related to her relatively new diagnosis of type 2 diabetes (within the last year).  I said that she should only have to test once per day, however, she insists that she would like to test twice per day.  I am okay with this, and I feel that it provides additional education for the patient regarding the effect of certain foods on her blood sugar to help her learn to be better controlled in the future.  I advised her to check once in the morning fasting daily and an additional check at one additional time per day at variable times including 2 hours after breakfast, before lunch, 2 hours after lunch, before dinner, 2 hours after dinner, and at bedtime. And she should keep a log of her blood glucose readings  - aspirin 81 MG tablet; Take 1 tablet (81 mg) by mouth daily  Dispense: 100 tablet; Refill: 3  - blood glucose (NO BRAND SPECIFIED) lancets standard; Use to test blood sugar 1-2 times daily or as directed.  Dispense: 100 each; Refill: 11  - blood glucose monitoring (NO BRAND SPECIFIED) test strip; Use to test blood sugars 2 times daily or as directed  Dispense: 100 strip; Refill: 11           Medications Discontinued During This Encounter   Medication Reason     BUSPAR 5 MG OR TABS      MOTRIN 800 MG OR TABS      order for DME      PRENATAL VITAMIN TABS   OR      PROPRANOLOL HCL 10 MG OR TABS      SYNTHROID 50 MCG OR TABS      Blood Pressure Monitor MISC      CONTOUR NEXT TEST test strip      DOCOSAHEXAENOIC ACID PO      hydrOXYzine (VISTARIL) 50 MG capsule      ibuprofen  (ADVIL/MOTRIN) 400 MG tablet      IRON PO      Lancets Misc. (UNISTIK 3 COMFORT) MISC      mineral oil-hydrophilic petrolatum (AQUAPHOR)      ASPIRIN 81 MG OR TABS Reorder     calcium polycarbophil (FIBERCON) 625 MG tablet Reorder     lisinopril (PRINIVIL/ZESTRIL) 2.5 MG tablet Reorder     Omega-3 Fatty Acids (FISH OIL OMEGA-3) 1000 MG CAPS Reorder     order for DME Reorder     order for DME Reorder     Prenatal Vit-Fe Fumarate-FA (PRENATAL MULTIVITAMIN PLUS IRON) 27-0.8 MG TABS per tablet Reorder     propranolol (INDERAL) 10 MG tablet Reorder     Vitamin D, Cholecalciferol, 1000 units TABS Reorder     acetaminophen (TYLENOL) 500 MG tablet Reorder     albuterol (PROAIR HFA/PROVENTIL HFA/VENTOLIN HFA) 108 (90 Base) MCG/ACT inhaler Reorder     metFORMIN (GLUCOPHAGE) 500 MG tablet Reorder     metFORMIN (GLUCOPHAGE) 500 MG tablet Reorder       Options for treatment and follow-up care were reviewed with the patient. Misty Girard  engaged in the decision making process and verbalized understanding of the options discussed and agreed with the final plan.    Tahir Armstrong MD

## 2018-10-25 NOTE — TELEPHONE ENCOUNTER
Closing encounter, have not heard back from pt in almost a month.   ARLET Burnett 4:17 PM October 25, 2018

## 2018-11-14 ENCOUNTER — TELEPHONE (OUTPATIENT)
Dept: FAMILY MEDICINE | Facility: CLINIC | Age: 48
End: 2018-11-14

## 2018-11-14 NOTE — TELEPHONE ENCOUNTER
[facility] phone call message- medication clarification/question:    Full Medication Name: Bengay   Dose: ?    Question/Clarification needed: [facility] from [facility] is calling because Dr. Armstrong stated at the last visit the patient could use the above medication. Due to [facility]'s licensure, all medications need to have a prescription label. They are requesting the above medication be sent to the pharmacy.     Pharmacy confirmed as   [facility] [place] [facility] - [place], [place] - [address]  [address]  [place] [address]  Phone: [phone] Fax: [phone]  : Yes    Please leave ONLY preferred pharmacy    OK to leave a message on voice mail? Yes    Advised patient that RN would call back within 3 hours, unless emergent.    Primary language: English      needed? No    Call taken on November 14, 2018 at 1:59 PM by Comfort Sneed    Route to [facility]

## 2018-11-15 ENCOUNTER — TELEPHONE (OUTPATIENT)
Dept: FAMILY MEDICINE | Facility: CLINIC | Age: 48
End: 2018-11-15

## 2018-11-15 ENCOUNTER — OFFICE VISIT (OUTPATIENT)
Dept: FAMILY MEDICINE | Facility: CLINIC | Age: 48
End: 2018-11-15
Payer: COMMERCIAL

## 2018-11-15 VITALS
DIASTOLIC BLOOD PRESSURE: 86 MMHG | WEIGHT: 276.4 LBS | SYSTOLIC BLOOD PRESSURE: 132 MMHG | HEART RATE: 84 BPM | OXYGEN SATURATION: 100 % | TEMPERATURE: 98.7 F | BODY MASS INDEX: 44.61 KG/M2 | RESPIRATION RATE: 20 BRPM

## 2018-11-15 DIAGNOSIS — I10 BENIGN ESSENTIAL HYPERTENSION: ICD-10-CM

## 2018-11-15 DIAGNOSIS — F41.1 GAD (GENERALIZED ANXIETY DISORDER): ICD-10-CM

## 2018-11-15 DIAGNOSIS — M54.50 LUMBAR BACK PAIN: ICD-10-CM

## 2018-11-15 DIAGNOSIS — M71.20 SYNOVIAL CYST OF KNEE, UNSPECIFIED LATERALITY: ICD-10-CM

## 2018-11-15 DIAGNOSIS — F41.0 PANIC DISORDER WITHOUT AGORAPHOBIA: ICD-10-CM

## 2018-11-15 DIAGNOSIS — I10 HYPERTENSION, ESSENTIAL, BENIGN: ICD-10-CM

## 2018-11-15 DIAGNOSIS — E11.9 CONTROLLED TYPE 2 DIABETES MELLITUS WITHOUT COMPLICATION, WITHOUT LONG-TERM CURRENT USE OF INSULIN (H): Primary | ICD-10-CM

## 2018-11-15 DIAGNOSIS — M17.0 OSTEOARTHRITIS OF BOTH KNEES, UNSPECIFIED OSTEOARTHRITIS TYPE: Primary | ICD-10-CM

## 2018-11-15 LAB
BUN SERPL-MCNC: 10.9 MG/DL (ref 7–19)
CALCIUM SERPL-MCNC: 9.7 MG/DL (ref 8.5–10.1)
CHLORIDE SERPLBLD-SCNC: 100.6 MMOL/L (ref 98–110)
CO2 SERPL-SCNC: 26.3 MMOL/L (ref 20–32)
CREAT SERPL-MCNC: 0.7 MG/DL (ref 0.5–1)
CREAT UR-MCNC: 116 MG/DL
GFR SERPL CREATININE-BSD FRML MDRD: >90 ML/MIN/1.7 M2
GLUCOSE SERPL-MCNC: 160.1 MG'DL (ref 70–99)
MICROALBUMIN UR-MCNC: 8 MG/L
MICROALBUMIN/CREAT UR: 7.21 MG/G CR (ref 0–25)
POTASSIUM SERPL-SCNC: 4.1 MMOL/DL (ref 3.3–4.5)
SODIUM SERPL-SCNC: 138.3 MMOL/L (ref 132.6–141.4)

## 2018-11-15 RX ORDER — ACETAMINOPHEN 500 MG
500-1000 TABLET ORAL EVERY 8 HOURS PRN
Qty: 100 TABLET | Refills: 3 | COMMUNITY
Start: 2018-11-15 | End: 2020-03-04

## 2018-11-15 RX ORDER — ATORVASTATIN CALCIUM 10 MG/1
10 TABLET, FILM COATED ORAL DAILY
Qty: 90 TABLET | Refills: 1 | Status: SHIPPED | OUTPATIENT
Start: 2018-11-15 | End: 2019-01-02

## 2018-11-15 RX ORDER — PROPRANOLOL HYDROCHLORIDE 10 MG/1
5-10 TABLET ORAL 3 TIMES DAILY PRN
Qty: 90 TABLET | Refills: 3 | COMMUNITY
Start: 2018-11-15

## 2018-11-15 ASSESSMENT — ENCOUNTER SYMPTOMS
FACIAL ASYMMETRY: 0
SLEEP DISTURBANCE: 0
CHEST TIGHTNESS: 0
TREMORS: 0
SEIZURES: 0
NUMBNESS: 0
PALPITATIONS: 0
WHEEZING: 0
BACK PAIN: 1
LIGHT-HEADEDNESS: 1
CONSTITUTIONAL NEGATIVE: 1
ARTHRALGIAS: 0
EYES NEGATIVE: 1
NERVOUS/ANXIOUS: 1
JOINT SWELLING: 0
DIZZINESS: 1
MYALGIAS: 0
COUGH: 0
SHORTNESS OF BREATH: 0
SPEECH DIFFICULTY: 0
WEAKNESS: 0
GASTROINTESTINAL NEGATIVE: 1
HEADACHES: 0

## 2018-11-15 NOTE — PATIENT INSTRUCTIONS
- Started Lipitor 10mg.  - Changed Propranolol to 5-10mg as needed for anxiety.   - Continue to take Lisinopril as prescribed.  - Changed Tylenol to 1-2 tabs as needed.   - Keep appointment with Psychiatry in January.  - We want to see you back here at [facility] in 2 weeks to discuss how things are going and talk about treatment for anxiety.

## 2018-11-15 NOTE — PROGRESS NOTES
HPI       Misty Girard is a 48 year old  who presents for   Chief Complaint   Patient presents with     Referral     Back Pain, requesting Chiropractor ref     Recheck Medication     D/C  some medications,change pain gel to a generic for insurance     Patient is primarily concerned about changing some of the medications on her list today. She would like to stop taking Lisinopril and change her Propranolol to 5mg prn. She has been taking her propranolol since 2005 for anxiety and now for blood pressure as well and feels it is causing her to be dizzy. She stopped taking her propranolol and lisinopril for the past 5 days and reports that her home readings have not been higher than 110-120/70, HR 80s.     Patient would also like to have a referral to see Chiropractor for chronic back pain due to DJD. Has had this previously and feels it helped.     In regards to her diabetes, she reports that her blood sugar in the morning has been 160-180s. Has an appointment with an endocrinologist in 3 weeks. Had eye exam at [facility] 2 months ago and [facility] in [place] in July.     Of note, patient was noted to be extremely anxious during our discussion today and had frequent brief crying spells. She reports feeling anxious about her health since her new diabetes diagnosis. She worries daily about dying from a stroke or heart attack. Has seen Dr Blanton recently and does have another appointment with Psychiatry in January.     Back Pain      Duration: 8-11 years back pain.         Specific cause: DJD    Description:   Location of pain: low back both  Character of pain: dull ache  Pain radiation:none  New numbness or weakness in legs, not attributed to pain:  No   Any new bowel or bladder incontinence?No     Intensity: Currently 3-4/10, moderate    History: History of DJD  Pain interferes with job/home/school: No   History of back problems: previous degenerative joint disease of the lumbar spine  Any previous MRI or X-rays: yes,  outside facility   Sees a specialist for back pain: no  Therapies tried without relief: none    Alleviating factors:   Improved by: Chiropractor     Precipitating factors:  Excessive exertional activity     Problem, Medication and Allergy Lists were reviewed and updated if needed..    Patient is an established patient of this clinic..         Review of Systems:   Review of Systems   Constitutional: Negative.    HENT: Negative.    Eyes: Negative.    Respiratory: Negative for cough, chest tightness, shortness of breath and wheezing.    Cardiovascular: Negative for chest pain and palpitations.   Gastrointestinal: Negative.    Musculoskeletal: Positive for back pain. Negative for arthralgias, gait problem, joint swelling and myalgias.   Neurological: Positive for dizziness and light-headedness. Negative for tremors, seizures, syncope, facial asymmetry, speech difficulty, weakness, numbness and headaches.   Psychiatric/Behavioral: Negative for sleep disturbance and suicidal ideas. The patient is nervous/anxious.           Physical Exam:     Vitals:    11/15/18 1503   BP: 132/86   Pulse: 84   Resp: 20   Temp: 98.7  F (37.1  C)   TempSrc: Oral   SpO2: 100%   Weight: 276 lb 6.4 oz (125.4 kg)     Body mass index is 44.61 kg/(m^2).  Vitals were reviewed and were normal     Physical Exam   Constitutional: She is oriented to person, place, and time. She appears well-developed and well-nourished. She appears distressed.   HENT:   Mouth/Throat: Oropharynx is clear and moist.   Eyes: Pupils are equal, round, and reactive to light.   Cardiovascular: Normal rate, regular rhythm and normal heart sounds.    No murmur heard.  Pulmonary/Chest: Breath sounds normal. She has no wheezes.   Lymphadenopathy:     She has no cervical adenopathy.   Neurological: She is alert and oriented to person, place, and time.   Psychiatric:   Patient is very anxious and jumps from one emotion to the next, fariba briefly on and off during discussion.           Results:      Results from this visit  Results for orders placed or performed in visit on 11/15/18   Basic Metabolic Panel ([facility])   Result Value Ref Range    Urea Nitrogen 10.9 7.0 - 19.0 mg/dL    Calcium 9.7 8.5 - 10.1 mg/dL    Chloride 100.6 98.0 - 110.0 mmol/L    Carbon Dioxide 26.3 20.0 - 32.0 mmol/L    Creatinine 0.7 0.5 - 1.0 mg/dL    Glucose 160.1 (H) 70.0 - 99.0 mg'dL    Potassium 4.1 3.3 - 4.5 mmol/dL    Sodium 138.3 132.6 - 141.4 mmol/L    GFR Estimate >90 >60.0 mL/min/1.7 m2    GFR Estimate If Black >90 >60.0 mL/min/1.7 m2   Albumin Random Urine Quantitative with Creat Ratio   Result Value Ref Range    Creatinine Urine 116 mg/dL    Albumin Urine mg/L 8 mg/L    Albumin Urine mg/g Cr 7.21 0 - 25 mg/g Cr     Assessment and Plan      Misty was seen today for referral and recheck medication.    Diagnoses and all orders for this visit:  Controlled type 2 diabetes mellitus without complication, without long-term current use of insulin (H) - Fasting morning BG has been 160-180s. BG today is 160. Last A1c in September was 6.7. Is working hard on portion control. Also exercising more regularly. Currently is on Metformin 1000mg BID and tolerating this well. No GI side effects. She does have diabetic neuropathy in her feet bilaterally. She would like to get shoe insoles if possible. Will make referral to Orthotics. Will also check renal function with BMP and urine Alb:Cr. Her calculated ASCVD risk is only 6% however moderate intensity statin therapy is still warranted based on new diabetes dx, family history significant for CAD (father), overweight, high blood pressure, and abnormal lipids. Will start Atorvastatin 10mg daily. She will need to come back in 2 weeks to follow up on labs and reassess blood pressure medications. Has appointment with endocrinology in 3 weeks.   -     Basic Metabolic Panel ([facility])  -     Albumin Random Urine Quantitative with Creat Ratio  -     atorvastatin (LIPITOR) 10 MG tablet;  Take 1 tablet (10 mg) by mouth daily  -     ORTHOTICS REFERRAL  -     ADMIN VACCINE, INITIAL (FLU)  -     FLU VAC PRESRV FREE QUAD SPLIT VIR IM, 0.5 mL dosage    The 10-year ASCVD risk score (Kalpesh ROTHMAN Jr, et al., 2013) is: 6%    Values used to calculate the score:      Age: 48 years      Sex: Female      Is Non- : No      Diabetic: Yes      Tobacco smoker: No      Systolic Blood Pressure: 132 mmHg      Is BP treated: Yes      HDL Cholesterol: 33.4 mg/dL      Total Cholesterol: 202.2 mg/dL    Diabetic peripheral neuropathy (H)  - Aquaphor cream jar    - continue metformin 1000mg BID    Hypertension, essential, benign- patient has a home blood pressure kit and reports that her pressure has been no higher than 110-120/70 with HR in 80s despite not taking her Propranolol or Lisinopril over the last 5 days. She has been on Propranolol since 2005 and has had ongoing mild dizziness from this medication. She reports that her blood pressure drops to lower than 90 systolic when she takes this with her Lisinopril. Her blood pressure today in clinic is 132/86 without taking any medications. Given its renal-protective benefits, will continue her Lisinopril at 2.5mg daily and change her Propranolol to 5mg prn for anxiety. She has been taking this medication for anxiety for years and feels that it helps. Ideally will want to discuss starting an serotonin specific reuptake inhibitor for her anxiety in the future. Will see her back in 2 weeks to recheck her blood pressure and follow up on urine alb:Cr. If normal, may consider discontinuing Lisinopril at that time.   -     Basic Metabolic Panel ([facility])  -     Albumin Random Urine Quantitative with Creat Ratio    Synovial cyst of knee, left- feels that Tylenol and Bengay helps. Discussed injection in the future if ongoing or worsening pain. She will think about it.    - Changed Tylenol 500mg PO to 1-2 tablets q8H prn.     ALONA (generalized anxiety disorder) -  patient has severe generalized anxiety. She is constantly worrying about her health and that she is going to die from a heart attack or stroke. She denies any jeannette/hypomania, psychosis or depression. She has been taking Propranolol 10mg daily since 2005 and feels this does help with her anxiety attacks however it also makes her dizzy. She has not tried a serotonin specific reuptake inhibitor before. She did recently see Dr Blanton on 10/4/18 however did not wish to start pharmacotherapy at that time. She was however interested in having genetic testing done to see which medications would work best for her. We discussed that trying an alternative medication to Propranlol may significantly improve her constant worrying as her current medication is generally not used for ALONA. She will think about it for further discussion in follow up. She does have an appointment with Psychiatry scheduled in January. In the meanwhile, she is strongly recommended to follow up here in clinic in the next two weeks for further discussion about treatment strategies. For now, will change her Propranolol to 5mg PRN for anxiety attacks.     Panic disorder without agoraphobia- as above. Changed Propranolol to 5mg PRN.     Benign essential hypertension- it is unclear whether patient actually has a diagnosis of HTN at this time. She has been off all of her BP meds for the past 5 days and reports home readings no greater than 120-130/70. BP in clinic today without medications is 132/86. She is also having side effects of dizziness from the Propranolol. Will continue her Lisinopril at 2.5mg daily and changed Propranolol to 5 mg prn. Will have her follow up in 2 weeks to recheck BP at that time and reassess if Lisinopril is needed.     Lumbar back pain- has history of DJD with previous outside imaging (XR and MRI) showing disease at L2/L3 level. No symptoms of radiculopathy or sciatica. Would like referral to chiropractor.   -     [facility] HAND AND  CHIROPRACTIC REFERRAL - [facility]; Future      Constipation, unspecified constipation type  - calcium polycarbophil (FIBERCON) 625 MG tablet; Take 2 tablets (1,250 mg) by mouth daily  Dispense: 180 tablet; Refill: 3    Reactive airway disease without complication, unspecified asthma severity, unspecified whether persistent- had Spirometry and PFTs a long time ago. Uses albuterol inhaler occasionally but infrequently.   - Continue to use albuterol inhaler as needed    Healthcare maintenance  - Omega-3 Fatty Acids (FISH OIL OMEGA-3) 1000 MG CAPS; Take 1,000 mg by mouth daily  Dispense: 90 capsule; Refill: 3    Medications Discontinued During This Encounter   Medication Reason     acetaminophen (TYLENOL) 500 MG tablet Reorder     propranolol (INDERAL) 10 MG tablet Reorder     Options for treatment and follow-up care were reviewed with the patient. Misty Girard  engaged in the decision making process and verbalized understanding of the options discussed and agreed with the final plan.     I was present with the medical student who participated in the service and in the documentation of this note. I have verified the history and personally performed the physical exam and medical decision making, and have verified the content of the note, which accurately reflects my assessment of the patient and the plan of care.    Alvin Pritchard, MS3  [facility]     I was present with the medical student who participated in the service and in the documentation of this note. I have verified the history and personally performed the physical exam and medical decision making, and have verified the content of the note, which accurately reflects my assessment of the patient and the plan of care.   MD Alvin Bennett MD, MPH  PGY-3 [facility]  Pager: [phone]          Keystone Flap Text: The defect edges were debeveled with a #15 scalpel blade.  Given the location of the defect, shape of the defect a keystone flap was deemed most appropriate.  Using a sterile surgical marker, an appropriate keystone flap was drawn incorporating the defect, outlining the appropriate donor tissue and placing the expected incisions within the relaxed skin tension lines where possible. The area thus outlined was incised deep to adipose tissue with a #15 scalpel blade.  The skin margins were undermined to an appropriate distance in all directions around the primary defect and laterally outward around the flap utilizing iris scissors.

## 2018-11-15 NOTE — NURSING NOTE
Injectable influenza vaccine documentation    1. Has the patient received the information for the influenza vaccine? YES    2. Does the patient have a severe allergy to eggs (Patients with a severe egg allergy should be assessed by a medical provider, RN, or clinical pharmacist. If they receive the influenza vaccine, please have them observed for 15 minutes.)? No    3. Has the patient had an allergic reaction to previous influenza vaccines? No    4. Has the patient had any severe allergic reactions to past influenza vaccines ? No       5. Does patient have a history of Guillain-Parlin syndrome? No      Based on responses above, I administered the influenza vaccine.  Lana Espinoza

## 2018-11-15 NOTE — MR AVS SNAPSHOT
After Visit Summary   11/15/2018    Misty Girard    MRN: [id number]           Patient Information     Date Of Birth          1970        Visit Information        Provider Department      11/15/2018 3:20 PM Alvin Wells MD [facility]        Today's Diagnoses     Controlled type 2 diabetes mellitus without complication, without long-term current use of insulin (H)    -  1    Hypertension, essential, benign        Synovial cyst of knee, unspecified laterality        ALONA (generalized anxiety disorder)        Panic disorder without agoraphobia        Benign essential hypertension        Lumbar back pain          Care Instructions    - Started Lipitor 10mg.  - Changed Propranolol to 5-10mg as needed for anxiety.   - Continue to take Lisinopril as prescribed.  - Changed Tylenol to 1-2 tabs as needed.   - Keep appointment with Psychiatry in January.  - We want to see you back here at [facility] in 2 weeks to discuss how things are going and talk about treatment for anxiety.               Follow-ups after your visit        Additional Services     [facility], PT, HAND AND CHIROPRACTIC REFERRAL - [facility]       Chiropractoric Care    Physical Therapy, Hand Therapy and Chiropractic Care are available through:  *[facility]  *Hand Therapy (Occupational Therapy or Physical Therapy)  *[facility]    Call one number to schedule at any of the above locations: [phone].    Physical therapy, Hand therapy and/or Chiropractic care has been recommended by your physician as an excellent treatment option to reduce pain and help people return to normal activities, including sports.  Therapy and/or chiropractic care services are a great complement or alternative to expensive and invasive surgery, injections, or long-term use of prescription medications. The primary goal is to identify the underlying problem and provide you the tools to manage your  condition on your own.     Please be aware that coverage of these services is subject to the terms and limitations of your health insurance plan.  Call member services at your health plan with any benefit or coverage questions.      Please bring the following to your appointment:  *Your personal calendar for scheduling future appointments  *Comfortable clothing            ORTHOTICS REFERRAL       **This referral order prints off in the [facility]  (Orthotics & Prosthetics) Central Scheduling Office**    The [facility] Scheduling Staff will contact the patient to schedule appointments.     Central Scheduling Contact Information: [phone] ([place])    Orthotics: Foot Orthotics    Please be aware that coverage of these services is subject to the terms and limitations of your health insurance plan.  Call member services at your health plan with any benefit or coverage questions.      Please bring the following to your appointment:    >>   Any x-rays, CTs or MRIs which have been performed.  Contact the facility where they were done to arrange for  prior to your scheduled appointment.    >>   List of current medications   >>   This referral request   >>   Any documents/labs given to you for this referral                  Your next 10 appointments already scheduled     Nov 28, 2018  3:00 PM CST   (Arrive by 2:45 PM)   NEW DIABETES with Lise Loco MD   [facility] ([facility])    [address] [place] [address]  3rd [facility] [address] [phone]              Future tests that were ordered for you today     Open Future Orders        Priority Expected Expires Ordered    [facility], PT, HAND AND CHIROPRACTIC REFERRAL - [facility] Routine  11/15/2019 11/15/2018            Who to contact     Please call your clinic at [phone] to:    Ask questions about your health    Make or cancel appointments    Discuss your medicines    Learn about your  test results    Speak to your doctor            Additional Information About Your Visit        Care EveryWhere ID     This is your Care EveryWhere ID. This could be used by other organizations to access your [place] medical records  [id number]        Your Vitals Were     Pulse Temperature Respirations Pulse Oximetry Breastfeeding? BMI (Body Mass Index)    84 98.7  F (37.1  C) (Oral) 20 100% No 44.61 kg/m2       Blood Pressure from Last 3 Encounters:   11/15/18 132/86   10/18/18 127/84   10/04/18 117/80    Weight from Last 3 Encounters:   11/15/18 276 lb 6.4 oz (125.4 kg)   10/18/18 276 lb (125.2 kg)   10/04/18 276 lb 6.4 oz (125.4 kg)              We Performed the Following     Albumin Random Urine Quantitative with Creat Ratio     Basic Metabolic Panel ([facility])     ORTHOTICS REFERRAL          Today's Medication Changes          These changes are accurate as of 11/15/18  4:37 PM.  If you have any questions, ask your nurse or doctor.               Start taking these medicines.        Dose/Directions    atorvastatin 10 MG tablet   Commonly known as:  LIPITOR   Used for:  Controlled type 2 diabetes mellitus without complication, without long-term current use of insulin (H)   Started by:  Alvin eWlls MD        Dose:  10 mg   Take 1 tablet (10 mg) by mouth daily   Quantity:  90 tablet   Refills:  1         These medicines have changed or have updated prescriptions.        Dose/Directions    acetaminophen 500 MG tablet   Commonly known as:  TYLENOL   This may have changed:  how much to take   Used for:  Synovial cyst of knee, unspecified laterality   Changed by:  Alvin Wells MD        Dose:  500-1000 mg   Take 1-2 tablets (500-1,000 mg) by mouth every 8 hours as needed for mild pain   Quantity:  100 tablet   Refills:  3       propranolol 10 MG tablet   Commonly known as:  INDERAL   This may have changed:  how much to take   Used for:  ALONA (generalized anxiety disorder), Panic disorder without  agoraphobia, Benign essential hypertension   Changed by:  Alvin Wells MD        Dose:  5-10 mg   Take 0.5-1 tablets (5-10 mg) by mouth 3 times daily as needed (anxiety)   Quantity:  90 tablet   Refills:  3            Where to get your medicines      These medications were sent to [facility] [id number] - [place], [place] - [address]  [address], [place] [address]     Phone:  [phone]     atorvastatin 10 MG tablet                Primary Care Provider Office Phone # Fax #    Agueda Gena Vera [phone]       [address], [address]  [facility] [address]        Equal Access to Services     MARAH MONTOYA : Donnie Diaz, wachristie palacios, qajellyta kaalmada nevaeh, chantal garcia . So [facility] [phone].    ATENCIÓN: Si habla español, tiene a barbour disposición servicios gratuitos de asistencia lingüística. Llame al [phone].    We comply with applicable federal civil rights laws and [place] laws. We do not discriminate on the basis of race, color, national origin, age, disability, sex, sexual orientation, or gender identity.            Thank you!     Thank you for choosing hospitals [facility]  for your care. Our goal is always to provide you with excellent care. Hearing back from our patients is one way we can continue to improve our services. Please take a few minutes to complete the written survey that you may receive in the mail after your visit with us. Thank you!             Your Updated Medication List - Protect others around you: Learn how to safely use, store and throw away your medicines at www.disposemymeds.org.          This list is accurate as of 11/15/18  4:37 PM.  Always use your most recent med list.                   Brand Name Dispense Instructions for use Diagnosis    acetaminophen 500 MG tablet    TYLENOL    100 tablet    Take 1-2 tablets (500-1,000 mg) by mouth every 8  hours as needed for mild pain    Synovial cyst of knee, unspecified laterality       albuterol 108 (90 Base) MCG/ACT inhaler    PROAIR HFA/PROVENTIL HFA/VENTOLIN HFA    2 Inhaler    Inhale 2 puffs into the lungs every 4 hours as needed for shortness of breath / dyspnea or wheezing    Reactive airway disease without complication, unspecified asthma severity, unspecified whether persistent       aspirin 81 MG tablet     100 tablet    Take 1 tablet (81 mg) by mouth daily    Type 2 diabetes mellitus without complication, without long-term current use of insulin (H)       atorvastatin 10 MG tablet    LIPITOR    90 tablet    Take 1 tablet (10 mg) by mouth daily    Controlled type 2 diabetes mellitus without complication, without long-term current use of insulin (H)       blood glucose lancets standard    no brand specified    100 each    Use to test blood sugar 1-2 times daily or as directed.    Type 2 diabetes mellitus without complication, without long-term current use of insulin (H)       blood glucose monitoring test strip    no brand specified    100 strip    Use to test blood sugars 2 times daily or as directed    Type 2 diabetes mellitus without complication, without long-term current use of insulin (H)       calcium polycarbophil 625 MG tablet    FIBERCON    180 tablet    Take 2 tablets (1,250 mg) by mouth daily    Constipation, unspecified constipation type       FISH OIL OMEGA-3 1000 MG Caps     90 capsule    Take 1,000 mg by mouth daily    Healthcare maintenance       levonorgestrel 20 MCG/24HR IUD    MIRENA     1 Device by Intrauterine route        lisinopril 2.5 MG tablet    PRINIVIL/Zestril    60 tablet    Take 0.5 tablets (1.25 mg) by mouth daily    Benign essential hypertension       * metFORMIN 500 MG tablet    GLUCOPHAGE    120 tablet    Take 2 tablets (1,000 mg) by mouth 2 times daily (with meals)    Diabetic peripheral neuropathy (H)       * metFORMIN 500 MG tablet    GLUCOPHAGE    60 tablet    Take 2  tablets (1,000 mg) by mouth 2 times daily (with meals)    Type 2 diabetes mellitus without complication, without long-term current use of insulin (H)       nystatin ointment    MYCOSTATIN     Apply topically 2 times daily        order for DME     1 Device    Equipment being ordered: Digital home blood pressure monitor kit with cuff    Benign essential hypertension       order for DME     2 Container    Equipment being ordered: Aquaphor cream jar    Dry skin, Diabetic peripheral neuropathy (H)       order for DME     1 Units    Equipment being ordered: Full spectrum 10,000 lux light box (Procedure code ) Will use 30 min every morning and afternoon in fall and winter months.    ALONA (generalized anxiety disorder)       prenatal multivitamin plus iron 27-0.8 MG Tabs per tablet     100 tablet    Take 1 tablet by mouth daily    Benign essential hypertension       propranolol 10 MG tablet    INDERAL    90 tablet    Take 0.5-1 tablets (5-10 mg) by mouth 3 times daily as needed (anxiety)    ALONA (generalized anxiety disorder), Panic disorder without agoraphobia, Benign essential hypertension       spacer holding chamber     1 each    Holding/spacer device to use with inhaler.    Reactive airway disease without complication, unspecified asthma severity, unspecified whether persistent       Vitamin D (Cholecalciferol) 1000 units Tabs     90 tablet    Take 1,000 Units by mouth daily    History of vitamin D deficiency       * Notice:  This list has 2 medication(s) that are the same as other medications prescribed for you. Read the directions carefully, and ask your doctor or other care provider to review them with you.

## 2018-11-15 NOTE — LETTER
November 18, 2018      Misty Peaceslim  [address] [facility] [address]        Dear Misty,    Thank you for getting your care at [facility]. Please see below for your test results.  Your urine microalbumin (the test we discussed that shows early damage to kidney from diabetes) was negative at his time, meaning you can stop taking the lisinopril as we discussed as your blood pressure readings at home had been controlled with no medications when last seen in clinic.  Continue your propanolol at the lower dosage as we discussed, but it is important that you see psychiatry and get further treatment for your anxiety disorder as soon as possible.  I will send message to Dr. Blanton regarding initiation of a medication before your scheduled visit in January.  This message will also be forwarded to our nursing staff who should call you as you requested.    Resulted Orders   Basic Metabolic Panel (\[facility]\"")   Result Value Ref Range    Urea Nitrogen 10.9 7.0 - 19.0 mg/dL    Calcium 9.7 8.5 - 10.1 mg/dL    Chloride 100.6 98.0 - 110.0 mmol/L    Carbon Dioxide 26.3 20.0 - 32.0 mmol/L    Creatinine 0.7 0.5 - 1.0 mg/dL    Glucose 160.1 (H) 70.0 - 99.0 mg'dL    Potassium 4.1 3.3 - 4.5 mmol/dL    Sodium 138.3 132.6 - 141.4 mmol/L    GFR Estimate >90 >60.0 mL/min/1.7 m2    GFR Estimate If Black >90 >60.0 mL/min/1.7 m2   Albumin Random Urine Quantitative with Creat Ratio   Result Value Ref Range    Creatinine Urine 116 mg/dL    Albumin Urine mg/L 8 mg/L    Albumin Urine mg/g Cr 7.21 0 - 25 mg/g Cr       If you have any concerns about these results please call and leave a message for me or send a MyChart message to the clinic.    Sincerely,    Alvin Wells MD

## 2018-11-15 NOTE — PROGRESS NOTES
Preceptor Attestation:   Patient seen, evaluated and discussed with the resident. I have verified the content of the note, which accurately reflects my assessment of the patient and the plan of care.   Supervising Physician:  Celia Mix MD

## 2018-11-15 NOTE — TELEPHONE ENCOUNTER
Verify that the refill encounter hasn't been started Yes    [facility] phone call message- patient requesting a refill:    Full Medication Name: Bengay    Dose: none per chart.     Pharmacy confirmed as   [facility] [address] - [place], [place] - [address]  [address]  [place] [address]  Phone: [phone] Fax: [phone]    [facility]  [address] [facility]  Phone: [phone] Fax: [phone]  : Yes    Medication tab checked to see if medication has been sent  Yes    Additional Comments: please send medication to pharmacy if appropriate.  Patient needs rx sent to [facility]     OK to leave a message on voice mail? Yes    Advised patient refill may take up to 2 business days? Yes    Primary language: English      needed? No    Call taken on November 15, 2018 at 12:54 PM by Verona Vargas    Route to  [facility] REFILL

## 2018-11-16 ENCOUNTER — TELEPHONE (OUTPATIENT)
Dept: FAMILY MEDICINE | Facility: CLINIC | Age: 48
End: 2018-11-16

## 2018-11-16 RX ORDER — MENTHOL 1.4 %
ADHESIVE PATCH, MEDICATED TOPICAL EVERY 6 HOURS PRN
Qty: 113 G | Refills: 3 | Status: SHIPPED | OUTPATIENT
Start: 2018-11-16 | End: 2019-12-06

## 2018-11-16 NOTE — TELEPHONE ENCOUNTER
[facility] phone call message- patient requesting results.    Test: Lab    Date of test: 11/15/18    Additional Comments: Patient called requesting results from labs.  (she said she has high anxiety and needs to know please)    Lab tab checked to verify if result comment present with in each order: Yes    Letters tab checked to verify if lab result letter has been entered: Yes    OK to leave a message on voice mail? Yes    Advised patient response may take up to 2 business days: No    Primary language: English      needed? No    Call taken on November 16, 2018 at 3:21 PM by Kary Cesar to [facility] TRIAGE

## 2018-11-16 NOTE — TELEPHONE ENCOUNTER
Message routed to Dr. Wells. Saw patient in clinic on 11/15. Please review labs and create lab letter with interpretation.    Mary Holt RN

## 2018-11-19 NOTE — TELEPHONE ENCOUNTER
RN attempted to reach patient. Dr. Wells placed a lab result letter to relay to patient. Unable to reach. Left VM with name and call back number on [facility] VM.    Mary Holt RN

## 2018-11-20 NOTE — TELEPHONE ENCOUNTER
RN called patient at [facility]. Relayed letter as written with results to patient. Patient verbalized understanding. RN faxed letter with results and instructions to Attn: Mikaela [phone]    Message routed to Dr. Wells. Please update the medication list with discontinuation of Lisinopril.    Mary Holt RN

## 2018-11-28 ENCOUNTER — OFFICE VISIT (OUTPATIENT)
Dept: ENDOCRINOLOGY | Facility: CLINIC | Age: 48
End: 2018-11-28
Payer: COMMERCIAL

## 2018-11-28 ENCOUNTER — TELEPHONE (OUTPATIENT)
Dept: FAMILY MEDICINE | Facility: CLINIC | Age: 48
End: 2018-11-28

## 2018-11-28 VITALS
WEIGHT: 279.2 LBS | DIASTOLIC BLOOD PRESSURE: 89 MMHG | SYSTOLIC BLOOD PRESSURE: 134 MMHG | HEART RATE: 97 BPM | HEIGHT: 65 IN | BODY MASS INDEX: 46.52 KG/M2

## 2018-11-28 DIAGNOSIS — E11.9 CONTROLLED TYPE 2 DIABETES MELLITUS WITHOUT COMPLICATION, WITHOUT LONG-TERM CURRENT USE OF INSULIN (H): Primary | ICD-10-CM

## 2018-11-28 DIAGNOSIS — E66.01 MORBID OBESITY (H): ICD-10-CM

## 2018-11-28 LAB — HBA1C MFR BLD: 6.5 % (ref 4.3–6)

## 2018-11-28 NOTE — PROGRESS NOTES
Endocrinology Clinic Visit    Chief Complaint: Consult (Type II Diabetes)     Information obtained from:Patient    Subjective:         HPI: Misty Girard is a 48 year old female with history of type 2 diabetes who is seen in consultation at Alvin Jacob's request for the same.     History of substance abuse with methamphetamine for 20+ years currently at [facility] on treatment since July 2018.     Diagnosed with diabetes about a year ago.  Started on metformin she is currently taking metformin 1000 international units twice daily.  Her A1c in September 2018 was 6.7 and have another reading in 11/2018 which was 6.5.  Hemoglobin is in the normal limits.  She does not have any side effects from metformin.  She reports that she was initially diagnosed with diabetes while she was in prison however she was reluctant to start medication until recently.    Blood sugar readings were downloaded.  She has been checking her blood sugars at least 2-3 times per day.  Her blood sugars are not consistently checked fasting but all the blood sugars we have are as follows over the last 1 week.  119, 131, 143, 117, 157, 149, 170, 148, 146, 143, 148, 143, 147.  Again this numbers are not consistently fasting/include non-fasting blood glucose.    She does not have any concerns today.  However she reports that she has generalized anxiety disorder and would like a prescription for hydroxyzine/Vistaril.  Discussed that I am not an expert in treating generalized anxiety disorder and specifically working with a provider who treats generalized anxiety disorder would be reasonable.  I have advised to discuss with primary care physician regarding ALONA.      No Known Allergies    Current Outpatient Prescriptions   Medication Sig Dispense Refill     acetaminophen (TYLENOL) 500 MG tablet Take 1-2 tablets (500-1,000 mg) by mouth every 8 hours as needed for mild pain 100 tablet 3     albuterol (PROAIR HFA/PROVENTIL HFA/VENTOLIN HFA) 108 (90  Base) MCG/ACT inhaler Inhale 2 puffs into the lungs every 4 hours as needed for shortness of breath / dyspnea or wheezing 2 Inhaler 3     aspirin 81 MG tablet Take 1 tablet (81 mg) by mouth daily 100 tablet 3     atorvastatin (LIPITOR) 10 MG tablet Take 1 tablet (10 mg) by mouth daily 90 tablet 1     blood glucose (NO BRAND SPECIFIED) lancets standard Use to test blood sugar 1-2 times daily or as directed. 100 each 11     blood glucose monitoring (NO BRAND SPECIFIED) test strip Use to test blood sugars 2 times daily or as directed 100 strip 11     calcium polycarbophil (FIBERCON) 625 MG tablet Take 2 tablets (1,250 mg) by mouth daily 180 tablet 3     levonorgestrel (MIRENA) 20 MCG/24HR IUD 1 Device by Intrauterine route       Menthol-Methyl Salicylate (OCTAVIO CONTI GREASELESS) cream Apply topically every 6 hours as needed (pain) 113 g 3     metFORMIN (GLUCOPHAGE) 500 MG tablet Take 2 tablets (1,000 mg) by mouth 2 times daily (with meals) 60 tablet 3     metFORMIN (GLUCOPHAGE) 500 MG tablet Take 2 tablets (1,000 mg) by mouth 2 times daily (with meals) 120 tablet 1     nystatin (MYCOSTATIN) ointment Apply topically 2 times daily       Omega-3 Fatty Acids (FISH OIL OMEGA-3) 1000 MG CAPS Take 1,000 mg by mouth daily 90 capsule 3     order for DME Equipment being ordered: Digital home blood pressure monitor kit with cuff 1 Device 0     order for DME Equipment being ordered: Aquaphor cream jar 2 Container 20     order for DME Equipment being ordered: Full spectrum 10,000 lux light box (Procedure code )  Will use 30 min every morning and afternoon in fall and winter months. 1 Units 0     Prenatal Vit-Fe Fumarate-FA (PRENATAL MULTIVITAMIN PLUS IRON) 27-0.8 MG TABS per tablet Take 1 tablet by mouth daily 100 tablet 3     propranolol (INDERAL) 10 MG tablet Take 0.5-1 tablets (5-10 mg) by mouth 3 times daily as needed (anxiety) 90 tablet 3     spacer (OPTICHAMBER GENARO) holding chamber Holding/spacer device to use with  "inhaler. 1 each 0     Vitamin D, Cholecalciferol, 1000 units TABS Take 1,000 Units by mouth daily 90 tablet 11       Review of Systems     11 point review system (Constitutional, HENT, Eyes, Respiratory, Cardiovascular, Gastrointestinal, Genitourinary, Musculoskeletal,Neurological, Psychiatric/Behavioural, Endocrine) is negative or is as per HPI above except for peripheral neuropathy.     Past Medical History:   Diagnosis Date     Elevated BP 1/27/2010    Following on/off propranalol     Fatigue 1/27/2010    Chronic fatigue syndrome?     Generalised anxiety disorder 1/27/2010    Propranalol as needed  Starting citalopram 1/10     Hypothyroidism 1/27/2010    50 mcg - 1/10     Secondary dysmenorrhea 1/27/2010       No past surgical history on file.    Family History   Problem Relation Age of Onset     Diabetes Mother      C.A.D. Father      Hypertension Father        Social History     Social History     Marital status: Unknown     Spouse name: N/A     Number of children: N/A     Years of education: N/A     Social History Main Topics     Smoking status: Never Smoker     Smokeless tobacco: Never Used     Alcohol use No     Drug use: No     Sexual activity: No      Comment: none     Other Topics Concern     None     Social History Narrative    Incarcerated 5 yrs (2005-10) [place] - Drug possession       Objective:   /89  Pulse 97  Ht 1.651 m (5' 5\")  Wt 126.6 kg (279 lb 3.2 oz)  BMI 46.46 kg/m2  Constitutional: Pleasant no acute cardiopulmonary distress.   EYES: anicteric, normal extra-ocular movements, no lid lag or retraction.  HEENT: Mouth/Throat: Mucous membrane is moist. Oropharynx is clear.   Cardiovascular: RRR, S1, S2 normal. No m/g/r   Pulmonary/Chest: CTAB. No wheezing or rales   Abdominal: +BS. Non tender to palpation. Stretch marks.   Neurological: Alert and oriented. No tremor.  Extremities: No edema.   Psychological: appropriate mood and affect     In House Labs:   Lab Results   Component Value " Date    A1C 6.7 09/21/2018       TSH   Date Value Ref Range Status   09/21/2018 2.47 0.40 - 4.00 mU/L Final   01/27/2010 1.13 0.4 - 5.0 mU/L Final     T4 Free   Date Value Ref Range Status   01/27/2010 1.32 0.70 - 1.85 ng/dL Final       Creatinine   Date Value Ref Range Status   11/15/2018 0.7 0.5 - 1.0 mg/dL Final   ]      Assessment/Treatment Plan:      Type 2 diabetes currently well controlled on current medication: A1c was 6.5 last week.  She has had a blood sugar readings with her today over the last 1 week her fasting blood sugar has been between 119-148.  No hypoglycemia symptoms or low blood sugar readings.  Continue current management with metformin.   Chronic complications  No diabetic retinopathy per report   Blood pressure within the normal range  Mild peripheral neuropathy  Non-smoker  Currently on a statin  No microalbuminuria    Morbid obesity with a BMI of 46.46 kg/m square: She is very much interested in working on losing weight.  She has been trying lifestyle changes.  Additional information on lifestyle changes provided written instructions below.  In addition we will going to send her to her our weight management clinic for additional pharmacologic treatment particularly Saxenda or other oral medications that can be used for weight management.  She reports that she has a friend who underwent sleeve gastrectomy and would like a referral to bariatric surgery as well and referral was placed. Addressing morbid obesity will also help for diabetes.      Patient Instructions   Body mass index is 46.46 kg/(m^2).   Caloric restriction, portion control and physical activity are evidence based strategies for life style changes. Here is a starting point in your case:       1,900 calorie meal plan; Goal is to lose 5-10% weight in the next six months.     Please keep a food journal of what you eat, calories in what you eat, and bring the journal with you to your next appointment.    Consider using applications  for smart phones such as [employer], [employer], [employer], LoseIt, Tap&Track, and RelaxM. To keep food journal and track your exercise.     Focus on wet volumetrics, meaning, eat more foods that are high in water and fiber such as fruits and vegetables in order to get that full feeling. These are also good for your overall health as well.    Progressively increase physical activity to 45 minutes, including combination of cardio & resistance training x 5 days weekly. Start at 10-15 minutes per day and progressively work towards this goal.       Chronic weight management (Saxenda): daily injection       I will contact the patient with the test results.  Return to clinic in 6 months/PRN; if patient follows at the weight management clinic then appointment with us is not needed. Referral to weight management clinic placed.     Test and/or medications prescribed today:  Orders Placed This Encounter   Procedures     BARIATRIC ADULT REFERRAL     Hemoglobin A1c POCT         Lise Loco MD  Staff Endocrinologist    [phone]  Division of Endocrinology and Diabetes

## 2018-11-28 NOTE — LETTER
11/28/2018       RE: Misty Girard  [address] [place] [id number]     Dear Colleague,    Thank you for referring your patient, Misty Girard, to the [facility] ENDOCRINOLOGY at [facility]. Please see a copy of my visit note below.    Endocrinology Clinic Visit    Chief Complaint: Consult (Type II Diabetes)     Information obtained from:Patient    Subjective:         HPI: Misty Girard is a 48 year old female with history of type 2 diabetes who is seen in consultation at Alvin Jacob's request for the same.     History of substance abuse with methamphetamine for 20+ years currently at [facility] [place] on treatment since July 2018.     Diagnosed with diabetes about a year ago.  Started on metformin she is currently taking metformin 1000 international units twice daily.  Her A1c in September 2018 was 6.7 and have another reading in 11/2018 which was 6.5.  Hemoglobin is in the normal limits.  She does not have any side effects from metformin.  She reports that she was initially diagnosed with diabetes while she was in group home however she was reluctant to start medication until recently.    Blood sugar readings were downloaded.  She has been checking her blood sugars at least 2-3 times per day.  Her blood sugars are not consistently checked fasting but all the blood sugars we have are as follows over the last 1 week.  119, 131, 143, 117, 157, 149, 170, 148, 146, 143, 148, 143, 147.  Again this numbers are not consistently fasting/include non-fasting blood glucose.    She does not have any concerns today.  However she reports that she has generalized anxiety disorder and would like a prescription for hydroxyzine/Vistaril.  Discussed that I am not an expert in treating generalized anxiety disorder and specifically working with a provider who treats generalized anxiety disorder would be reasonable.  I have advised to discuss with primary care physician regarding ALONA.      No  Known Allergies    Current Outpatient Prescriptions   Medication Sig Dispense Refill     acetaminophen (TYLENOL) 500 MG tablet Take 1-2 tablets (500-1,000 mg) by mouth every 8 hours as needed for mild pain 100 tablet 3     albuterol (PROAIR HFA/PROVENTIL HFA/VENTOLIN HFA) 108 (90 Base) MCG/ACT inhaler Inhale 2 puffs into the lungs every 4 hours as needed for shortness of breath / dyspnea or wheezing 2 Inhaler 3     aspirin 81 MG tablet Take 1 tablet (81 mg) by mouth daily 100 tablet 3     atorvastatin (LIPITOR) 10 MG tablet Take 1 tablet (10 mg) by mouth daily 90 tablet 1     blood glucose (NO BRAND SPECIFIED) lancets standard Use to test blood sugar 1-2 times daily or as directed. 100 each 11     blood glucose monitoring (NO BRAND SPECIFIED) test strip Use to test blood sugars 2 times daily or as directed 100 strip 11     calcium polycarbophil (FIBERCON) 625 MG tablet Take 2 tablets (1,250 mg) by mouth daily 180 tablet 3     levonorgestrel (MIRENA) 20 MCG/24HR IUD 1 Device by Intrauterine route       Menthol-Methyl Salicylate (OCTAVIO CONTI GREASELESS) cream Apply topically every 6 hours as needed (pain) 113 g 3     metFORMIN (GLUCOPHAGE) 500 MG tablet Take 2 tablets (1,000 mg) by mouth 2 times daily (with meals) 60 tablet 3     metFORMIN (GLUCOPHAGE) 500 MG tablet Take 2 tablets (1,000 mg) by mouth 2 times daily (with meals) 120 tablet 1     nystatin (MYCOSTATIN) ointment Apply topically 2 times daily       Omega-3 Fatty Acids (FISH OIL OMEGA-3) 1000 MG CAPS Take 1,000 mg by mouth daily 90 capsule 3     order for DME Equipment being ordered: Digital home blood pressure monitor kit with cuff 1 Device 0     order for DME Equipment being ordered: Aquaphor cream jar 2 Container 20     order for DME Equipment being ordered: Full spectrum 10,000 lux light box (Procedure code )  Will use 30 min every morning and afternoon in fall and winter months. 1 Units 0     Prenatal Vit-Fe Fumarate-FA (PRENATAL MULTIVITAMIN PLUS  "IRON) 27-0.8 MG TABS per tablet Take 1 tablet by mouth daily 100 tablet 3     propranolol (INDERAL) 10 MG tablet Take 0.5-1 tablets (5-10 mg) by mouth 3 times daily as needed (anxiety) 90 tablet 3     spacer (OPTICHAMBER GENARO) holding chamber Holding/spacer device to use with inhaler. 1 each 0     Vitamin D, Cholecalciferol, 1000 units TABS Take 1,000 Units by mouth daily 90 tablet 11       Review of Systems     11 point review system (Constitutional, HENT, Eyes, Respiratory, Cardiovascular, Gastrointestinal, Genitourinary, Musculoskeletal,Neurological, Psychiatric/Behavioural, Endocrine) is negative or is as per HPI above except for peripheral neuropathy.     Past Medical History:   Diagnosis Date     Elevated BP 1/27/2010    Following on/off propranalol     Fatigue 1/27/2010    Chronic fatigue syndrome?     Generalised anxiety disorder 1/27/2010    Propranalol as needed  Starting citalopram 1/10     Hypothyroidism 1/27/2010    50 mcg - 1/10     Secondary dysmenorrhea 1/27/2010       No past surgical history on file.    Family History   Problem Relation Age of Onset     Diabetes Mother      C.A.D. Father      Hypertension Father        Social History     Social History     Marital status: Unknown     Spouse name: N/A     Number of children: N/A     Years of education: N/A     Social History Main Topics     Smoking status: Never Smoker     Smokeless tobacco: Never Used     Alcohol use No     Drug use: No     Sexual activity: No      Comment: none     Other Topics Concern     None     Social History Narrative    Incarcerated 5 yrs (2005-10) [place] - Drug possession       Objective:   /89  Pulse 97  Ht 1.651 m (5' 5\")  Wt 126.6 kg (279 lb 3.2 oz)  BMI 46.46 kg/m2  Constitutional: Pleasant no acute cardiopulmonary distress.   EYES: anicteric, normal extra-ocular movements, no lid lag or retraction.  HEENT: Mouth/Throat: Mucous membrane is moist. Oropharynx is clear.   Cardiovascular: RRR, S1, S2 normal. " No m/g/r   Pulmonary/Chest: CTAB. No wheezing or rales   Abdominal: +BS. Non tender to palpation. Stretch marks.   Neurological: Alert and oriented. No tremor.  Extremities: No edema.   Psychological: appropriate mood and affect     In House Labs:   Lab Results   Component Value Date    A1C 6.7 09/21/2018       TSH   Date Value Ref Range Status   09/21/2018 2.47 0.40 - 4.00 mU/L Final   01/27/2010 1.13 0.4 - 5.0 mU/L Final     T4 Free   Date Value Ref Range Status   01/27/2010 1.32 0.70 - 1.85 ng/dL Final       Creatinine   Date Value Ref Range Status   11/15/2018 0.7 0.5 - 1.0 mg/dL Final   ]      Assessment/Treatment Plan:      Type 2 diabetes currently well controlled on current medication: A1c was 6.5 last week.  She has had a blood sugar readings with her today over the last 1 week her fasting blood sugar has been between 119-148.  No hypoglycemia symptoms or low blood sugar readings.  Continue current management with metformin.   Chronic complications  No diabetic retinopathy per report   Blood pressure within the normal range  Mild peripheral neuropathy  Non-smoker  Currently on a statin  No microalbuminuria    Morbid obesity with a BMI of 46.46 kg/m square: She is very much interested in working on losing weight.  She has been trying lifestyle changes.  Additional information on lifestyle changes provided written instructions below.  In addition we will going to send her to her our weight management clinic for additional pharmacologic treatment particularly Saxenda or other oral medications that can be used for weight management.  She reports that she has a friend who underwent sleeve gastrectomy and would like a referral to bariatric surgery as well and referral was placed. Addressing morbid obesity will also help for diabetes.      Patient Instructions   Body mass index is 46.46 kg/(m^2).   Caloric restriction, portion control and physical activity are evidence based strategies for life style changes. Here  is a starting point in your case:       1,900 calorie meal plan; Goal is to lose 5-10% weight in the next six months.     Please keep a food journal of what you eat, calories in what you eat, and bring the journal with you to your next appointment.    Consider using applications for smart phones such as [employer], [employer], LoseIt, Tap&Track, and RelaxM. To keep food journal and track your exercise.     Focus on wet volumetrics, meaning, eat more foods that are high in water and fiber such as fruits and vegetables in order to get that full feeling. These are also good for your overall health as well.    Progressively increase physical activity to 45 minutes, including combination of cardio & resistance training x 5 days weekly. Start at 10-15 minutes per day and progressively work towards this goal.       Chronic weight management (Saxenda): daily injection       I will contact the patient with the test results.  Return to clinic in 6 months/PRN; if patient follows at the weight management clinic then appointment with us is not needed. Referral to weight management clinic placed.     Test and/or medications prescribed today:  Orders Placed This Encounter   Procedures     BARIATRIC ADULT REFERRAL     Hemoglobin A1c POCT       Lise Loco MD  Staff Endocrinologist    [phone]  Division of Endocrinology and Diabetes

## 2018-11-28 NOTE — PATIENT INSTRUCTIONS
Body mass index is 46.46 kg/(m^2).   Caloric restriction, portion control and physical activity are evidence based strategies for life style changes. Here is a starting point in your case:       1,900 calorie meal plan; Goal is to lose 5-10% weight in the next six months.     Please keep a food journal of what you eat, calories in what you eat, and bring the journal with you to your next appointment.    Consider using applications for smart phones such as [employer], [employer], [employer], LoseIt, Tap&Track, and RelaxM. To keep food journal and track your exercise.     Focus on wet volumetrics, meaning, eat more foods that are high in water and fiber such as fruits and vegetables in order to get that full feeling. These are also good for your overall health as well.    Progressively increase physical activity to 45 minutes, including combination of cardio & resistance training x 5 days weekly. Start at 10-15 minutes per day and progressively work towards this goal.       Chronic weight management (Saxenda): daily injection

## 2018-11-28 NOTE — MR AVS SNAPSHOT
After Visit Summary   11/28/2018    Misty Girard    MRN: [id number]           Patient Information     Date Of Birth          1970        Visit Information        Provider Department      11/28/2018 3:00 PM Lise Loco MD [facility]        Today's Diagnoses     Controlled type 2 diabetes mellitus without complication, without long-term current use of insulin (H)    -  1    Morbid obesity (H)          Care Instructions    Body mass index is 46.46 kg/(m^2).   Caloric restriction, portion control and physical activity are evidence based strategies for life style changes. Here is a starting point in your case:       1,900 calorie meal plan; Goal is to lose 5-10% weight in the next six months.     Please keep a food journal of what you eat, calories in what you eat, and bring the journal with you to your next appointment.    Consider using applications for smart phones such as [employer], [employer], LoseIt, Tap&Track, and RelaxM. To keep food journal and track your exercise.     Focus on wet volumetrics, meaning, eat more foods that are high in water and fiber such as fruits and vegetables in order to get that full feeling. These are also good for your overall health as well.    Progressively increase physical activity to 45 minutes, including combination of cardio & resistance training x 5 days weekly. Start at 10-15 minutes per day and progressively work towards this goal.       Chronic weight management (Saxenda): daily injection           Follow-ups after your visit        Additional Services     BARIATRIC ADULT REFERRAL       Your provider has referred you to: PREFERRED PROVIDERS:    Please be aware that coverage of these services is subject to the terms and limitations of your health insurance plan.  Call member services at your health plan with any benefit or coverage questions.      Please bring the following with you to your appointment:      (1) List of  current medications   (2) This referral request   (3) Any documents/labs given to you for this referral                  Follow-up notes from your care team     Return in about 6 months (around 5/28/2019).      Your next 10 appointments already scheduled     Dec 03, 2018  3:10 PM CST   (Arrive by 2:55 PM)   [facility] with Jen Hernandez PT   [facility] [place] [facility] ([facility] [place])    [address]  [place] [address]   [phone]            May 29, 2019  1:30 PM CDT   (Arrive by 1:15 PM)   RETURN DIABETES with Lise Loco MD   [facility] ([facility] [place])    [address]  [address] [facility] [address] [phone]              Who to contact     Please call your clinic at [phone] to:    Ask questions about your health    Make or cancel appointments    Discuss your medicines    Learn about your test results    Speak to your doctor            Additional Information About Your Visit        [facility] Information     [facility] gives you secure access to your electronic health record. If you see a primary care provider, you can also send messages to your care team and make appointments. If you have questions, please call your primary care clinic.  If you do not have a primary care provider, please call [phone] and they will assist you.      [facility] is an electronic gateway that provides easy, online access to your medical records. With [facility], you can request a clinic appointment, read your test results, renew a prescription or communicate with your care team.     To access your existing account, please contact your [facility] or call [phone] for assistance.        Care EveryWhere ID     This is your Care EveryWhere ID. This could be used by other organizations to access your [facility] medical records  [id number]        Your Vitals Were     Pulse Height BMI (Body Mass  "Index)             97 1.651 m (5' 5\") 46.46 kg/m2          Blood Pressure from Last 3 Encounters:   11/28/18 134/89   11/15/18 132/86   10/18/18 127/84    Weight from Last 3 Encounters:   11/28/18 126.6 kg (279 lb 3.2 oz)   11/15/18 125.4 kg (276 lb 6.4 oz)   10/18/18 125.2 kg (276 lb)              We Performed the Following     BARIATRIC ADULT REFERRAL     Hemoglobin A1c POCT        Primary Care Provider Office Phone # Fax #    Agueda Gena Vera [phone]       [address] [place] [address], [address]  [facility] [address]        Equal Access to Services     MARAH MONTOYA : Donnie Diaz, wachristie palacios, qapeyton kaalmamonica herring, chantal harding. So [facility] [phone].    ATENCIÓN: Si habla español, tiene a barbour disposición servicios gratuitos de asistencia lingüística. [facility] [phone].    We comply with applicable federal civil rights laws and [place] laws. We do not discriminate on the basis of race, color, national origin, age, disability, sex, sexual orientation, or gender identity.            Thank you!     Thank you for choosing [facility]  for your care. Our goal is always to provide you with excellent care. Hearing back from our patients is one way we can continue to improve our services. Please take a few minutes to complete the written survey that you may receive in the mail after your visit with us. Thank you!             Your Updated Medication List - Protect others around you: Learn how to safely use, store and throw away your medicines at www.disposemymeds.org.          This list is accurate as of 11/28/18  4:09 PM.  Always use your most recent med list.                   Brand Name Dispense Instructions for use Diagnosis    acetaminophen 500 MG tablet    TYLENOL    100 tablet    Take 1-2 tablets (500-1,000 mg) by mouth every 8 hours as needed for mild pain    Synovial cyst of knee, unspecified laterality       albuterol 108 (90 " Base) MCG/ACT inhaler    PROAIR HFA/PROVENTIL HFA/VENTOLIN HFA    2 Inhaler    Inhale 2 puffs into the lungs every 4 hours as needed for shortness of breath / dyspnea or wheezing    Reactive airway disease without complication, unspecified asthma severity, unspecified whether persistent       aspirin 81 MG tablet    ASA    100 tablet    Take 1 tablet (81 mg) by mouth daily    Type 2 diabetes mellitus without complication, without long-term current use of insulin (H)       atorvastatin 10 MG tablet    LIPITOR    90 tablet    Take 1 tablet (10 mg) by mouth daily    Controlled type 2 diabetes mellitus without complication, without long-term current use of insulin (H)       OCTAVIO CONTI GREASELESS cream     113 g    Apply topically every 6 hours as needed (pain)    Osteoarthritis of both knees, unspecified osteoarthritis type       blood glucose lancets standard    NO BRAND SPECIFIED    100 each    Use to test blood sugar 1-2 times daily or as directed.    Type 2 diabetes mellitus without complication, without long-term current use of insulin (H)       blood glucose monitoring test strip    NO BRAND SPECIFIED    100 strip    Use to test blood sugars 2 times daily or as directed    Type 2 diabetes mellitus without complication, without long-term current use of insulin (H)       calcium polycarbophil 625 MG tablet    FIBERCON    180 tablet    Take 2 tablets (1,250 mg) by mouth daily    Constipation, unspecified constipation type       FISH OIL OMEGA-3 1000 MG Caps     90 capsule    Take 1,000 mg by mouth daily    Healthcare maintenance       levonorgestrel 20 MCG/24HR IUD    MIRENA     1 Device by Intrauterine route        * metFORMIN 500 MG tablet    GLUCOPHAGE    120 tablet    Take 2 tablets (1,000 mg) by mouth 2 times daily (with meals)    Diabetic peripheral neuropathy (H)       * metFORMIN 500 MG tablet    GLUCOPHAGE    60 tablet    Take 2 tablets (1,000 mg) by mouth 2 times daily (with meals)    Type 2 diabetes mellitus  without complication, without long-term current use of insulin (H)       nystatin 756847 UNIT/GM external ointment    MYCOSTATIN     Apply topically 2 times daily        order for DME     1 Device    Equipment being ordered: Digital home blood pressure monitor kit with cuff    Benign essential hypertension       order for DME     2 Container    Equipment being ordered: Aquaphor cream jar    Dry skin, Diabetic peripheral neuropathy (H)       order for DME     1 Units    Equipment being ordered: Full spectrum 10,000 lux light box (Procedure code ) Will use 30 min every morning and afternoon in fall and winter months.    ALONA (generalized anxiety disorder)       prenatal multivitamin w/iron 27-0.8 MG tablet     100 tablet    Take 1 tablet by mouth daily    Benign essential hypertension       propranolol 10 MG tablet    INDERAL    90 tablet    Take 0.5-1 tablets (5-10 mg) by mouth 3 times daily as needed (anxiety)    ALONA (generalized anxiety disorder), Panic disorder without agoraphobia, Benign essential hypertension       spacer holding chamber     1 each    Holding/spacer device to use with inhaler.    Reactive airway disease without complication, unspecified asthma severity, unspecified whether persistent       Vitamin D (Cholecalciferol) 1000 units Tabs     90 tablet    Take 1,000 Units by mouth daily    History of vitamin D deficiency       * Notice:  This list has 2 medication(s) that are the same as other medications prescribed for you. Read the directions carefully, and ask your doctor or other care provider to review them with you.

## 2018-11-28 NOTE — TELEPHONE ENCOUNTER
Verify that the refill encounter hasn't been started Yes    [facility] phone call message- patient requesting a refill:    Full Medication Name: hydrOXYzine (VISTARIL) 50 MG capsule    Dose: TAKE 1 CAP BY MOUTH TWICE A DAY     Pharmacy confirmed as   [facility] [place] [address] - [place], [place] - [address]  [address]  [place] [address]  Phone: [phone] Fax: [phone]  : Yes    Medication tab checked to see if medication has been sent  Yes    Additional Comments: Patient would like to have this medication filled in 25MG instead of the 50 so she can take it three times a day as needed.    OK to leave a message on voice mail? Yes    Advised patient refill may take up to 2 business days? Yes    Primary language: English      needed? No    Call taken on November 28, 2018 at 4:57 PM by Ailyn Hamilton    Route to P [facility] MED REFILL

## 2018-11-28 NOTE — NURSING NOTE
Chief Complaint   Patient presents with     Consult     Type II Diabetes     Performed capillary puncture for A1C testing. Patient tolerated well.    Isa Verde, [facility]  Endocrinology & Diabetes

## 2018-11-29 NOTE — TELEPHONE ENCOUNTER
"Message per PCP: \"I will not  her only anxiety as needed should be propranolol. That's what she and I had discussed, and what is in the psychiatrist's recommendations. Especially given that she reports very sensitive blood pressures on lisinopril, I would not want 2 as needed hypotensive medications on board.\"      "

## 2018-11-29 NOTE — TELEPHONE ENCOUNTER
Message routed to PCP to review and advise. See request below.    Not on current medication list. Per med review, discontinued on 10/18/2018.    Mary Holt RN

## 2018-11-29 NOTE — TELEPHONE ENCOUNTER
Left detailed message on secure line with providers response.  Nargis Pino RN on 11/29/2018 at 10:53 AM

## 2018-11-30 ENCOUNTER — DOCUMENTATION ONLY (OUTPATIENT)
Dept: FAMILY MEDICINE | Facility: CLINIC | Age: 48
End: 2018-11-30

## 2018-11-30 NOTE — PROGRESS NOTES
"When opening a documentation only encounter, be sure to enter in \"Chief Complaint\" Forms and in \" Comments\" Title of form, description if needed.    Misty is a 48 year old  female  Form received via: Fax  Form now resides in: Provider Ready    Shilpa Coombs MA    Form has been completed by provider.     Form sent out via: Fax to [employer] at Fax Number: [phone]  Patient informed: No  Output date: November 30, 2018    Shilpa Coombs MA                      "

## 2018-12-03 ENCOUNTER — TELEPHONE (OUTPATIENT)
Dept: FAMILY MEDICINE | Facility: CLINIC | Age: 48
End: 2018-12-03

## 2018-12-03 ENCOUNTER — THERAPY VISIT (OUTPATIENT)
Dept: PHYSICAL THERAPY | Facility: CLINIC | Age: 48
End: 2018-12-03
Payer: COMMERCIAL

## 2018-12-03 DIAGNOSIS — M54.50 LUMBAR BACK PAIN: ICD-10-CM

## 2018-12-03 NOTE — PROGRESS NOTES
[facility] Initial Evaluation  Subjective:  Patient is a 48 year old female presenting with rehab back hpi. No  was used.           11/15/2018 referral date. Pt states she has had back pain for 34 years since having her children.    .         and is intermittent and reported as 2/10.   Pain is worse during the day and worse during the night.  Symptoms are exacerbated by standing and relieved by rest.  Since onset symptoms are unchanged.  Special tests:  X-ray.      General health as reported by patient is fair.  Past medical history: mental illness, substance abuse, morbid obesity, sleep disorder, diabetes, numbness/ tingling, smoking, thyroid problems, asthma.  Medical allergies: no.  Other surgeries include:  No.  Medication history: lipitor, D3 vitamins, omega 3.  Current occupation is Pt currently working with [employer]  .        Barriers include:  None as reported by the patient.    Red flags:  None as reported by the patient.        Objective:  System    Physical Exam    General     ROS    Assessment/Plan:    Patient is a 48 year old female with lumbar complaints.    Patient has the following significant findings with corresponding treatment plan.                Diagnosis 1:  ***  {:846777}    Therapy Evaluation Codes:   1) History comprised of:   Personal factors that impact the plan of care:      {Personal factors impacting care:791446}.    Comorbidity factors that impact the plan of care are:      {Comorbidities impacting care:360740}.     Medications impacting care: {Medications Impacting care:042348}.  2) Examination of Body Systems comprised of:   Body structures and functions that impact the plan of care:      {Body Structures and functions:071631}.   Activity limitations that impact the plan of care are:      {Activity/participation limitations or restrictions:861097}.  3) Clinical presentation characteristics are:   {Clinical presentation  "characteristics:516904}.  4) Decision-Making    {Decision Making Low/Moderate/High:726705}  Cumulative Therapy Evaluation is: {Low/Moderate/High/Re-evaluation complexity:777903}.    Previous and current functional limitations:  (See Goal Flow Sheet for this information)    Short term and Long term goals: (See Goal Flow Sheet for this information)     Communication ability:  {COMMUNICATION ABILITY:115382::\"Patient appears to be able to clearly communicate and understand verbal and written communication and follow directions correctly.\"}  Treatment Explanation - The following has been discussed with the patient:   {RX EXPLANATION:615703::\"RX ordered/plan of care\",\"Anticipated outcomes\",\"Possible risks and side effects\"}  {RX PLANNED rehab:042200::\"This patient would benefit from PT intervention to resume normal activities.\"}   Rehab potential is {REHAB POTENTIAL/PROGNOSIS:109589}.    Frequency:  {FREQUENCY OF TREATMENT:978495}  Duration:  {DURATION OF TREATMENT:341344}  Discharge Plan:  {Discharge Plan:350263::\"Achieve all LTG.\",\"Independent in home treatment program.\",\"Reach maximal therapeutic benefit.\"}    Please refer to the daily flowsheet for treatment today, total treatment time and time spent performing 1:1 timed codes.       "

## 2018-12-03 NOTE — TELEPHONE ENCOUNTER
[facility] phone call message- medication clarification/question:    Full Medication Name: Hydroxizine 25mg (3x daily) as needed.    Question/Clarification needed: Patient states she was previously on medication and thought it was making her dizzy but it turned out to be another medication.  She is requesting Dr. Wells prescribe the dosage listed.  She has exceeded the # of visits she is allowed to have for this month, so she cannot come in again for another office visit.  Will provider send this request to the [place] pharmacy listed below.  She states her anxiety is really bad and needs this asap.    Pharmacy confirmed as   [id number] - [place], [place] - [address]  [address]  [place] [address]  Phone: [phone] Fax: [phone]    : Yes    Please leave ONLY preferred pharmacy    OK to leave a message on voice mail? Yes    Advised patient that RN would call back within 3 hours, unless emergent.    Primary language: English      needed? No    Call taken on December 3, 2018 at 4:25 PM by Kary Cesar to [facility] TRIAGE

## 2018-12-03 NOTE — PROGRESS NOTES
Pt arrived to PT and filled out low back paperwork. Pt reports she is not sure why she is here for PT. Pt reports that she thinks this is a waste of time as she has had neuropathy from diabetes into B feet. Pt is stating she has more concern for her knees as she reports B baker's cyst. Pt states she does not want to waste PT's time. Pt educated that she can leave if she wishes to and does not need to perform the evaluation if she does not want to. Pt states she wants to leave, no evaluation performed.

## 2018-12-04 NOTE — TELEPHONE ENCOUNTER
"PCP states: \"How can she have exceeded her visits for the month? It's only December 4th. \".     RN called and left voicemail for patient to gain further clarification on this statement and informed her that she does need to be seen for this concern.  Nargis Pino RN   "

## 2018-12-14 ENCOUNTER — OFFICE VISIT (OUTPATIENT)
Dept: FAMILY MEDICINE | Facility: CLINIC | Age: 48
End: 2018-12-14
Payer: COMMERCIAL

## 2018-12-14 VITALS
SYSTOLIC BLOOD PRESSURE: 123 MMHG | OXYGEN SATURATION: 96 % | DIASTOLIC BLOOD PRESSURE: 83 MMHG | HEART RATE: 78 BPM | TEMPERATURE: 99.3 F | WEIGHT: 277 LBS | RESPIRATION RATE: 18 BRPM | BODY MASS INDEX: 46.1 KG/M2

## 2018-12-14 DIAGNOSIS — F41.9 ANXIETY DISORDER, UNSPECIFIED TYPE: Primary | ICD-10-CM

## 2018-12-14 DIAGNOSIS — I83.812 VARICOSE VEINS OF LEFT LOWER EXTREMITY WITH PAIN: ICD-10-CM

## 2018-12-14 RX ORDER — HYDROXYZINE PAMOATE 25 MG/1
25-50 CAPSULE ORAL 3 TIMES DAILY PRN
Qty: 90 CAPSULE | Refills: 3 | Status: SHIPPED | OUTPATIENT
Start: 2019-01-14 | End: 2019-05-06

## 2018-12-14 RX ORDER — HYDROXYZINE PAMOATE 25 MG/1
25-50 CAPSULE ORAL 3 TIMES DAILY PRN
Qty: 90 CAPSULE | Refills: 0 | Status: SHIPPED | OUTPATIENT
Start: 2018-12-14 | End: 2019-05-06

## 2018-12-14 ASSESSMENT — ANXIETY QUESTIONNAIRES
GAD7 TOTAL SCORE: 12
6. BECOMING EASILY ANNOYED OR IRRITABLE: SEVERAL DAYS
5. BEING SO RESTLESS THAT IT IS HARD TO SIT STILL: SEVERAL DAYS
IF YOU CHECKED OFF ANY PROBLEMS ON THIS QUESTIONNAIRE, HOW DIFFICULT HAVE THESE PROBLEMS MADE IT FOR YOU TO DO YOUR WORK, TAKE CARE OF THINGS AT HOME, OR GET ALONG WITH OTHER PEOPLE: SOMEWHAT DIFFICULT
3. WORRYING TOO MUCH ABOUT DIFFERENT THINGS: MORE THAN HALF THE DAYS
7. FEELING AFRAID AS IF SOMETHING AWFUL MIGHT HAPPEN: SEVERAL DAYS
2. NOT BEING ABLE TO STOP OR CONTROL WORRYING: NEARLY EVERY DAY
1. FEELING NERVOUS, ANXIOUS, OR ON EDGE: NEARLY EVERY DAY

## 2018-12-14 ASSESSMENT — ENCOUNTER SYMPTOMS
AGITATION: 1
HYPERACTIVE: 1
CONFUSION: 0
DIZZINESS: 0
NAUSEA: 0
PALPITATIONS: 1
SHORTNESS OF BREATH: 0
CHEST TIGHTNESS: 1
ABDOMINAL PAIN: 0
LIGHT-HEADEDNESS: 0
DECREASED CONCENTRATION: 1
SLEEP DISTURBANCE: 1
NERVOUS/ANXIOUS: 1
HALLUCINATIONS: 0
DYSPHORIC MOOD: 0

## 2018-12-14 ASSESSMENT — PATIENT HEALTH QUESTIONNAIRE - PHQ9: 5. POOR APPETITE OR OVEREATING: SEVERAL DAYS

## 2018-12-14 NOTE — PROGRESS NOTES
Preceptor Attestation:   Patient seen, evaluated and discussed with the resident. I have verified the content of the note, which accurately reflects my assessment of the patient and the plan of care.   Supervising Physician:  Norma Leigh MD

## 2018-12-14 NOTE — PROGRESS NOTES
HPI       Misty Girard is a 48 year old  who presents for   Chief Complaint   Patient presents with     Anxiety     Increased, SInce last visit     Refill Request         Mood Symptoms     Concerns: Long history of anxiety, previously controlled with concurrent propranolol and Vistaril usage.  Had discontinued withdrawal when she was sober from meth, had been previously controlled with as needed propranolol.  The last 2 months she has noticed her anxiety worsening, having multiple anxiety attacks per week.  She states these occur especially at nights, as she lies awake worrying about her health and the possibility of a heart attack.  She states her recent diabetes diagnosis has significantly contributed to this.  She also states that inactivity at [facility] has given her more time to worry about this.    Has seen after Harvinder in the past, has a upcoming appointment with Dr. Blanton in January    Has an appointment with Dr. Dowling at a clinic on the you for genetic testing for proper antidepressant selection.  She does not wish to start an antidepressant today, because she is seriously concerned about side effects.  She states she has witnessed bad side effects from people using antidepressants in prison, and so would feel much safer taking an antidepressant once she has a test to say that it is the correct one for her.    Is continuing to go to group therapy sessions at [facility].    Is still sober, but notes that she often has a desire to use when she feels overwhelmed.  She states she is relying on her new coping mechanisms to stay sober and is doing okay with that.    Previously had tried Wellbutrin, gabapentin, and BuSpar.  All had failed.  Has never tried an SSRI.    Is not having any dizzy spells.  Is only taking her 5 mg propranolol in the morning, and has not been taking her other as needed doses of propranolol.  She had discontinued her lisinopril as discussed in her last visit.  Her home  blood pressures have been in the 130s /80s-90s.  States that her mood is doing okay, she is not severely depressed, she denies SI or HI.    Today's PHQ-9 Score:   No flowsheet data found.       ALONA Score:  No flowsheet data found.          +++++++    Concern: L calf pain   Description of the problem : Misty presents with some left calf fullness with occasional discomfort.  She is afraid that this could be a clot in her leg.  She denies gross swelling, edema, redness, severe pain of the left leg, motion of the leg does not change this pain.  This has been there for years and has not changed significantly.         Problem, Medication and Allergy Lists were reviewed and updated if needed..    Patient is an established patient of this clinic..         Review of Systems:   Review of Systems   Respiratory: Positive for chest tightness. Negative for shortness of breath.    Cardiovascular: Positive for palpitations. Negative for chest pain and leg swelling.   Gastrointestinal: Negative for abdominal pain and nausea.   Neurological: Negative for dizziness, syncope and light-headedness.   Psychiatric/Behavioral: Positive for agitation, decreased concentration and sleep disturbance. Negative for behavioral problems, confusion, dysphoric mood, hallucinations, self-injury and suicidal ideas. The patient is nervous/anxious and is hyperactive.             Physical Exam:     Vitals:    12/14/18 1403   BP: 123/83   Pulse: 78   Resp: 18   Temp: 99.3  F (37.4  C)   SpO2: 96%   Weight: 125.6 kg (277 lb)     Body mass index is 46.1 kg/m .  Vitals were reviewed and were normal     Physical Exam   Constitutional: She is oriented to person, place, and time. She appears well-developed and well-nourished. No distress.   HENT:   Head: Normocephalic and atraumatic.   Right Ear: External ear normal.   Left Ear: External ear normal.   Nose: Nose normal.   Mouth/Throat: Oropharynx is clear and moist.   Cardiovascular: Normal rate and regular  rhythm.   Pulmonary/Chest: Effort normal. No respiratory distress.   Musculoskeletal:   Minimal fullness in left medial calf without fluctuance, tenderness, erythema.  Negative Homans sign   Neurological: She is alert and oriented to person, place, and time.   Skin: Skin is warm and dry. Capillary refill takes less than 2 seconds. No rash noted. She is not diaphoretic.   Psychiatric:   Much calmer today than I have seen her on previous exams.  Minimal rocking back and forth.  Speech generally at a normal speed, not pressured.  Affect generally normal.  Thought processes linear.         Results:   No testing ordered today    Assessment and Plan        Misty is a 48 year old female with worsening of her chronic anxiety disorder and almost daily panic attacks, where she had previously been controlled on prn propranolol tid. She has only been using one of her propranolol doses daily. Her past anxiety disorder is complicated by a long history of methamphetamine abuse, now in remission. She had used propranolol in combination with vistaril in the past, but also while she was using methamphetamine. Given that she has not had any low home BPs, we decided it was safe to add vistaril on as a prn to use for this next month, and keep the propranolol to use in addition if needed. I advised that a seratonergic antidepressant would be most helpful for her in the long run, especially since she has never been on an serotonin specific reuptake inhibitor or SNRI before. She has an appointment with Dr Mays in one month to take her genetic testing for a long term antidepressant, and follows up with Dr Blanton in psych for an assessment in January. She states she's happy to take an antidepressant once the testing tells her it is right for her, but given her horrible anxiety about side effects she declines to start one today without the testing.    1. Anxiety disorder, unspecified type  - hydrOXYzine (VISTARIL) 25 MG capsule; Take 1-2  capsules (25-50 mg) by mouth 3 times daily as needed for anxiety  Dispense: 90 capsule; Refill: 0  - hydrOXYzine (VISTARIL) 25 MG capsule; Take 1-2 capsules (25-50 mg) by mouth 3 times daily as needed for anxiety  Dispense: 90 capsule; Refill: 3    2. Varicose veins of left lower extremity with pain  - reassured that this is a varicose vein +/- a lipoma, not a DVT  - can evaluate for procedural intervention if it grows or causes more pain in the future         There are no discontinued medications.    Options for treatment and follow-up care were reviewed with the patient. Misty Girard  engaged in the decision making process and verbalized understanding of the options discussed and agreed with the final plan.    David Vera, DO

## 2018-12-14 NOTE — PATIENT INSTRUCTIONS
Here is the plan from today's visit    1. Anxiety disorder, unspecified type  - you can use the atarax (vistaril) up to 3 times per day for anxiety  - you can still use the propranolol too if needed  - this month's atarax is sent to the [facility] pharmacy, after that, the atarax is sent to [facility]  - please see Dr Mays in 1 month for the cheek swab about a long-term anxiety medicine like an antidepressant  - keep exercising, going to group, and doing crafts as you are!  - excellent job maintaining your sobriety!  - Standing restriction of 30-45 min still in place because of your knees.  - hydrOXYzine (VISTARIL) 25 MG capsule; Take 1-2 capsules (25-50 mg) by mouth 3 times daily as needed for anxiety  Dispense: 90 capsule; Refill: 0  - hydrOXYzine (VISTARIL) 25 MG capsule; Take 1-2 capsules (25-50 mg) by mouth 3 times daily as needed for anxiety  Dispense: 90 capsule; Refill: 3      Please call or return to clinic if your symptoms don't go away.    Follow up plan  Please make a clinic appointment for follow up with me (HELLEN ARREGUIN) in 2  months for anxiety.    Thank you for coming to [facility] today.  Lab Testing:  **If you had lab testing today and your results are reassuring or normal they will be mailed to you or sent through [facility] within 7 days.   **If the lab tests need quick action we will call you with the results.  The phone number we will call with results is # [phone] (home) . If this is not the best number please call our clinic and change the number.  Medication Refills:  If you need any refills please call your pharmacy and they will contact us.   If you need to  your refill at a new pharmacy, please contact the new pharmacy directly. The new pharmacy will help you get your medications transferred faster.   Scheduling:  If you have any concerns about today's visit or wish to schedule another appointment please call our office during normal business hours [phone] (8-5:00  M-F)  If a referral was made to a [facility] and you don't get a call from central scheduling please call [phone].  If a Mammogram was ordered for you at [facility] call [phone] to schedule or change your appointment.  If you had an XRay/CT/Ultrasound/MRI ordered the number is [phone] to schedule or change your radiology appointment.   Medical Concerns:  If you have urgent medical concerns please call [phone] at any time of the day.    Agueda Vera, DO

## 2018-12-17 ENCOUNTER — DOCUMENTATION ONLY (OUTPATIENT)
Dept: FAMILY MEDICINE | Facility: CLINIC | Age: 48
End: 2018-12-17

## 2018-12-17 NOTE — PROGRESS NOTES
"When opening a documentation only encounter, be sure to enter in \"Chief Complaint\" Forms and in \" Comments\" Title of form, description if needed.    Misty is a 48 year old  female  Form received via: Fax  Form now resides in: Provider Ready    Shilpa Coombs MA     Form has been completed by provider.     Form sent out via: Fax to EMELY MERINO at Fax Number: [phone]  Patient informed: No  Output date: December 17, 2018    Shilpa Coombs MA                        "

## 2018-12-18 ENCOUNTER — TELEPHONE (OUTPATIENT)
Dept: FAMILY MEDICINE | Facility: CLINIC | Age: 48
End: 2018-12-18

## 2018-12-18 NOTE — TELEPHONE ENCOUNTER
[facility] phone call message - order or referral request from patient:     Order or referral being requested: order for knee injections and podiatry  Additional Details: please call Janet if the patient is supposed to have an order for knee injections and podiatry.     OK to leave a message on voice mail? Yes    Primary language: English      needed? No    Call taken on December 18, 2018 at 1:25 PM by Verona Vargas    Order request route to [facility] TRIAGE   Referrals Route to [facility] (Green/[place]/Purple) CARE COORDINATOR

## 2018-12-18 NOTE — TELEPHONE ENCOUNTER
"Spoke with Janet and she stated that on the form that the PCP fills out during visits and sends back to [facility] stated, \"30-45 minute at a time standing restrictions until after injection\". It also stated that patient is going to see Dr. Mays in a month.     They are requesting more information on wether the patient requires a referral for injection (RN looked into things more and can understand that this would be relating to the varicose veins, however in the progress note it seemed as though the plan is to evaluate for further interventions if remains bothersome in the future). Please advise/clarify the plan for varicose veins. Secondly, if you are aware of who (which group/office) Dr. Mays works with, let RN know so she can relay info to [place] [facility].  Nargis Pino RN   "

## 2018-12-19 NOTE — TELEPHONE ENCOUNTER
"Per PCP,\"Please return call to [facility] patient. Sorry for the confusion. Here's my clarifications:     1) The patient should not be standing for longer than 30-45 minutes until she has a follow up appointment with our sports medicine clinic for knee joint injections for her OA     2) No further follow up for the varicose vein is necessary unless her symptoms worsen     3) The Dr Mays information was per patient report. I am not sure how to see future patient appointments with the new Epic upgrade, but I believe the patient has an appointment with the [facility] for genetic testing for medication choice coming up in January. I believe she also has an appointment with Dr. Helen Blanton (a psychiatrist) at our clinic in January. It might be possible that there was confusion with the names, as Dr. Blanton's first name is Irma? Things may have also been confused because Dr. Blanton is a psychiatrist who works both as a consultant at our clinic, and as an attending at the [facility]. Could you please look into the patient's future appointments and clarify what she has scheduled? She definitely needs the Psych residency clinic appointment, as they are the only ones that do that genetic testing. After that, she can follow up with either Dr Blanton here, or with me here for her anxiety management.     David Vera, DO \"    RN called and left message on secure line with the clarifications listed above. Patient is not currently scheduled with Dr. Blanton, left their phone number on the message.  Nargis Pino RN   "

## 2019-01-02 ENCOUNTER — OFFICE VISIT (OUTPATIENT)
Dept: FAMILY MEDICINE | Facility: CLINIC | Age: 49
End: 2019-01-02
Payer: COMMERCIAL

## 2019-01-02 VITALS
RESPIRATION RATE: 16 BRPM | OXYGEN SATURATION: 95 % | DIASTOLIC BLOOD PRESSURE: 86 MMHG | SYSTOLIC BLOOD PRESSURE: 133 MMHG | HEART RATE: 77 BPM | BODY MASS INDEX: 46.93 KG/M2 | TEMPERATURE: 98.7 F | WEIGHT: 282 LBS

## 2019-01-02 DIAGNOSIS — M17.0 PRIMARY OSTEOARTHRITIS OF BOTH KNEES: Primary | ICD-10-CM

## 2019-01-02 DIAGNOSIS — E55.9 VITAMIN D DEFICIENCY: ICD-10-CM

## 2019-01-02 DIAGNOSIS — E11.42 DIABETIC PERIPHERAL NEUROPATHY (H): ICD-10-CM

## 2019-01-02 DIAGNOSIS — E11.9 CONTROLLED TYPE 2 DIABETES MELLITUS WITHOUT COMPLICATION, WITHOUT LONG-TERM CURRENT USE OF INSULIN (H): ICD-10-CM

## 2019-01-02 LAB — DEPRECATED CALCIDIOL+CALCIFEROL SERPL-MC: 21 UG/L (ref 20–75)

## 2019-01-02 RX ORDER — ATORVASTATIN CALCIUM 10 MG/1
10 TABLET, FILM COATED ORAL DAILY
Qty: 90 TABLET | Refills: 1 | Status: SHIPPED | OUTPATIENT
Start: 2019-01-02 | End: 2019-03-22

## 2019-01-02 NOTE — PROGRESS NOTES
HPI       Misty Girard is a 48 year old  who presents for   Chief Complaint   Patient presents with     RECHECK     f/u bilateral knee pain. Coritsone injections in the past have helped. Arthritis flare up when she stands for long periods of time and the feet go numb.      Refill Request     metformin, lipitor     Blood Draw     would like vitamin D3 and cholesterol      Letter for School/Work     never recieved a letter for her vit D light box.      Was diagnosed with bilateral knee arthritis on X-ray (results in care everywhere) a few years ago and was given cortisone shots once since then but is not currently taking anything for the pain. Knee pain bothers her intermittently when she walks up stairs or steps up into the car. She reports exercising every day and losing weight which she will continue to do. She has tried yarelis-sleeves which help with her pain, but she is still having breakthrough pain with exertion.     She notes that she would like to check her Vitamin D level today as she was previously low, was corrected on oral supplementation, but has since stopped taking the medication and would like to see if she still needs to take the medication. She denies any current symptoms.    Additionally, she has significant mental health history and she reports feeling well today. She is scheduled to see her psychiatrist in the middle of this month and plans to discuss her medications at that point. PHQ=9, ALONA=13 today.    +++++++    Problem, Medication and Allergy Lists were reviewed and updated if needed..    Patient is an established patient of this clinic..         Review of Systems:   Review of Systems   Constitutional: Negative for chills, fatigue and fever.   Eyes: Negative for visual disturbance.   Respiratory: Negative for shortness of breath.    Cardiovascular: Negative for chest pain and palpitations.   Gastrointestinal: Negative for abdominal pain, constipation, diarrhea and nausea.    Genitourinary: Negative for difficulty urinating.   Musculoskeletal: Positive for arthralgias. Negative for joint swelling.   Skin: Negative for pallor.            Physical Exam:     Vitals:    01/02/19 1406   BP: 133/86   Pulse: 77   Resp: 16   Temp: 98.7  F (37.1  C)   TempSrc: Oral   SpO2: 95%   Weight: 127.9 kg (282 lb)     Body mass index is 46.93 kg/m .  Vitals were reviewed and were normal     Physical Exam   Constitutional: She is oriented to person, place, and time. She appears well-developed and well-nourished. No distress.   Obese   HENT:   Head: Normocephalic and atraumatic.   Eyes: Conjunctivae and EOM are normal. No scleral icterus.   Cardiovascular: Normal rate, regular rhythm and normal heart sounds.   Pulmonary/Chest: Effort normal and breath sounds normal. No respiratory distress.   Abdominal: Soft. Bowel sounds are normal. There is no tenderness. There is no guarding.   Musculoskeletal: Normal range of motion. She exhibits tenderness (along bilateral medial knee joint lines). She exhibits no edema or deformity.   Negative mary's and ant/post drawer bilaterally  Baker's cyst palpated on left knee exam   Neurological: She is alert and oriented to person, place, and time.   Skin: Skin is warm and dry. No erythema.   Psychiatric: She has a normal mood and affect.         Results:      Results from this visit  Results for orders placed or performed in visit on 01/02/19   Vitamin D Level   Result Value Ref Range    Vitamin D Deficiency screening 21 20 - 75 ug/L       Assessment and Plan        Misty was seen today for recheck, refill request, blood draw and letter for school/work.    Diagnoses and all orders for this visit:    Primary osteoarthritis of both knees  Given her younger age, mild symptoms, and the fact that she has not exhausted other treatment options, I would like to avoid injections at this point and will first attempt to control symptoms with voltaren gel. Encouraged continued  exercise and weight loss as well as this is probably more important in preventing further joint damage and inflammation. If symptoms continue, would still consider injections as these have provided relief for her in the past.  -     diclofenac (VOLTAREN) 1 % topical gel; Place onto the skin 4 times daily  - Encouraged continued exercise and weight loss    Diabetic peripheral neuropathy (H)  -     metFORMIN (GLUCOPHAGE) 500 MG tablet; Take 2 tablets (1,000 mg) by mouth 2 times daily (with meals)    Controlled type 2 diabetes mellitus without complication, without long-term current use of insulin (H)  -     atorvastatin (LIPITOR) 10 MG tablet; Take 1 tablet (10 mg) by mouth daily    Vitamin D deficiency  -     Vitamin D Level within normal limits  - Recommended continued balanced diet and monitoring for symptoms before restarting supplementation.       There are no discontinued medications.    Options for treatment and follow-up care were reviewed with the patient. Misty Girard  engaged in the decision making process and verbalized understanding of the options discussed and agreed with the final plan.    Alvin Jones MD

## 2019-01-02 NOTE — PATIENT INSTRUCTIONS
Here is the plan from today's visit    1. Diabetic peripheral neuropathy (H)  - metFORMIN (GLUCOPHAGE) 500 MG tablet; Take 2 tablets (1,000 mg) by mouth 2 times daily (with meals)  Dispense: 120 tablet; Refill: 1    2. Controlled type 2 diabetes mellitus without complication, without long-term current use of insulin (H)  - atorvastatin (LIPITOR) 10 MG tablet; Take 1 tablet (10 mg) by mouth daily  Dispense: 90 tablet; Refill: 1    3. Primary osteoarthritis of both knees  - diclofenac (VOLTAREN) 1 % topical gel; Place onto the skin 4 times daily  Dispense: 100 g; Refill: 0    4. Vitamin D deficiency  - Vitamin D Level    Please call or return to clinic if your symptoms don't go away.    Follow up plan  Please make a clinic appointment for follow up with your primary physician Agueda Vera, DO as needed.  Thank you for coming to [facility] today.  Lab Testing:  **If you had lab testing today and your results are reassuring or normal they will be mailed to you or sent through [facility] [employer] [facility] [employer] within 7 days.   **If the lab tests need quick action we will call you with the results.  The phone number we will call with results is # [phone] (home) . If this is not the best number please call our clinic and change the number.  Medication Refills:  If you need any refills please call your pharmacy and they will contact us.   If you need to  your refill at a new pharmacy, please contact the new pharmacy directly. The new pharmacy will help you get your medications transferred faster.   Scheduling:  If you have any concerns about today's visit or wish to schedule another appointment please call our office during normal business hours [phone] (8-5:00 M-F)  If a referral was made to a [facility] and you don't get a call from central scheduling please call [phone].  If a Mammogram was ordered for you at [facility] call [phone] to schedule or change your  appointment.  If you had an XRay/CT/Ultrasound/MRI ordered the number is [phone] to schedule or change your radiology appointment.   Medical Concerns:  If you have urgent medical concerns please call [phone] at any time of the day.    Alvin Jones MD

## 2019-01-02 NOTE — LETTER
January 2, 2019      To Whom It May Concern:      Misty Girard was seen in our clinic today, 01/02/19, for knee pain. She needs a standing restriction for 30-45 minutes at a time, and a drop off and  for any distance further than a quarter-block for the next ninety days while trying a new medication for knee pain.       Sincerely,        Alvin Jones MD

## 2019-01-05 ASSESSMENT — ENCOUNTER SYMPTOMS
JOINT SWELLING: 0
ARTHRALGIAS: 1
SHORTNESS OF BREATH: 0
PALPITATIONS: 0
ABDOMINAL PAIN: 0
CONSTIPATION: 0
DIARRHEA: 0
CHILLS: 0
FEVER: 0
DIFFICULTY URINATING: 0
NAUSEA: 0
FATIGUE: 0

## 2019-01-05 NOTE — RESULT ENCOUNTER NOTE
Please call the patient with the results. Notably, vitamin D level is normal and she does not need to continue supplementation at this time. Would recommend a balanced diet and continuing to exercise as much as possible for her knee pain.    Alvin Jones MD

## 2019-01-08 ASSESSMENT — PATIENT HEALTH QUESTIONNAIRE - PHQ9: SUM OF ALL RESPONSES TO PHQ QUESTIONS 1-9: 6

## 2019-01-09 ASSESSMENT — ANXIETY QUESTIONNAIRES: GAD7 TOTAL SCORE: 12

## 2019-01-10 ENCOUNTER — TELEPHONE (OUTPATIENT)
Dept: PSYCHOLOGY | Facility: CLINIC | Age: 49
End: 2019-01-10

## 2019-01-10 NOTE — TELEPHONE ENCOUNTER
Hi there- That's great information that we can do it here.  So based on all of this, Autumn can you call the patient and schedule them with Dr. Vera if possible on a day that pharmacy is here, if there isn't a time with Dr. Vera then someone else on her team.  Once the lab is complete and there are results, we can get her scheduled with Dr. Blanton for additional med recommendations.  Autumn - if the appt gets scheduled with someone other than Dr. Vera can you let me know so I can yellow dot just to give the person a heads up about the conversation going into this.  Does this sound OK to everyone?  Thanks everyone,  Kaur    So I talked to Berto and we (rory's ) do genesight testing at our clinic. That's why I asked about it getting scheduled a day pharmacy is here. I don't think we need any other recs/info for the testing. We just need to get Misty scheduled in with us to actually swab her.   I thought through conversation that Dr. Blanton would be open to another follow up with Misty in the future for anxiety management, but I may have misunderstood that. If so, I'm sorry for the confusion.   Thanks,   David Vera,    ===View-only below this line===    ----- Message -----  From: Helen Blanton MD  Sent: 1/8/2019  10:49 PM  To: Kaur Martínez PsyD, *  Subject: RE: psych follow up appts                        [facility],  RE: Gene[facility] - Yes, [facility] DOES do it. I thought that was not the case but I learned several weeks ago that it can be done there. SO she does not need to go to the Psych Clinic for that.  AND  We can re-consult if you would like. Happy to see pts again, do it all of the time. Send them back anytime for any reason. We are here to help!      ----- Message -----  From: Agueda Vera DO  Sent: 1/8/2019  12:55 PM  To: Kaur Martínez PsyD, Helen Blanton MD, *  Subject: RE: psych follow up appts                        So I talked to Berto and we  (rory's ) do genesight testing at our clinic. That's why I asked about it getting scheduled a day pharmacy is here. I don't think we need any other recs/info for the testing. We just need to get Misty scheduled in with us to actually swab her.  I thought through conversation that Dr. Blanton would be open to another follow up with Misty in the future for anxiety management, but I may have misunderstood that. If so, I'm sorry for the confusion.  Thanks,  David Vera DO      ----- Message -----  From: Kaur Martínez PsyD  Sent: 1/8/2019  12:49 PM  To: Helen Blanton MD, Autumn Dan, *  Subject: RE: psych follow up appts                        Hi Dr. Blanton - This is a patient you saw this Fall.  She was interested in gene testing.  It looks like she was given information to follow up at the psychiatry clinic, but it doesn't look like that happened.  Could you please let us know what the process would be for the patient to get the genesite testing?  In your note, it states it isn't done at our clinic, so just wondering how we get this patient to the right spot with the right person for this to happen.  Thank you so much!  Kaur  ----- Message -----  From: Autumn Dan  Sent: 1/8/2019   9:19 AM  To: Kaur Martínez PsyD  Subject: FW: psych follow up appts                        Kaur,    Please see message below from . I received this yesterday from Cheri LEMOS. I did not see anything in clinic note that stated  would see her again. Maybe I missed something, let me know. Thank you Kaur!    Autumn  ----- Message -----  From: Giselle Ramírez  Sent: 1/7/2019  10:01 AM  To: Autumn Dan  Subject: FW: psych follow up appts                            ----- Message -----  From: Agueda Vera DO  Sent: 1/4/2019   2:39 PM  To: [facility]  Subject: psych follow up appts                            Hi, this patient is an [facility]  Patient. Please call her to have her schedule an appointment with me or another provider to do Genesight genetic antidepressant testing (preferably a day pharmacy is here) in the next week or two.  Also please have her schedule a psych follow up appointment at our clinic with Dr Blanton for February (she saw this patient in the summer and ok'd a follow up appointment if needed).  This patient also has a care coordinator if that is helpful (I wasn't able to get a hold of her this afternoon): Yecenia Deleon [phone]    Thanks,  David Vera, DO

## 2019-01-12 PROBLEM — Z97.5 IUD (INTRAUTERINE DEVICE) IN PLACE: Status: ACTIVE | Noted: 2019-01-12

## 2019-01-15 NOTE — TELEPHONE ENCOUNTER
1/10/19 Patient is scheduled to see PharmD on 02/01/2019 @ 1:40p.m and 02/01/19 @ 2:00p.m with . After these appts with lab results, patient can be scheduled with .    Left detailed message ([phone]) with Norman  / [facility]. Asked for call back of confirmation.    1/15/19 Left another detailed message with Norman about appointment information/confirmation of these appts.    Autumn Dan  Care Coordinator

## 2019-01-18 ENCOUNTER — TRANSFERRED RECORDS (OUTPATIENT)
Dept: HEALTH INFORMATION MANAGEMENT | Facility: CLINIC | Age: 49
End: 2019-01-18

## 2019-01-22 DIAGNOSIS — M17.0 PRIMARY OSTEOARTHRITIS OF BOTH KNEES: ICD-10-CM

## 2019-01-22 NOTE — TELEPHONE ENCOUNTER

## 2019-01-23 ENCOUNTER — TELEPHONE (OUTPATIENT)
Dept: FAMILY MEDICINE | Facility: CLINIC | Age: 49
End: 2019-01-23

## 2019-01-23 ENCOUNTER — DOCUMENTATION ONLY (OUTPATIENT)
Dept: FAMILY MEDICINE | Facility: CLINIC | Age: 49
End: 2019-01-23

## 2019-01-23 NOTE — TELEPHONE ENCOUNTER
RN left message to get more information on what she is looking for, however after speaking with Haven I found out that the lab will send all [place] [facility] results over in a letter.    RN routing to lab to please send letter when able. Thank you.  Nargis Pino RN

## 2019-01-23 NOTE — PROGRESS NOTES
"  When opening a documentation only encounter, be sure to enter in \"Chief Complaint\" Forms and in \" Comments\" Title of form, description if needed.    Misty is a 48 year old  female  Form received via: Fax  Form now resides in: Provider Ready    Shilpa Coombs MA          Form has been completed by provider.     Form sent out via: Fax to [employer] at Fax Number: [phone]  Patient informed: No  Output date: January 23, 2019    Shilpa Coombs MA        "

## 2019-01-23 NOTE — TELEPHONE ENCOUNTER
[facility] phone call message- patient requesting results.    Test: Lab    Date of test: 1/2/19    Additional Comments: [place] [facility] requesting letter regarding patients Vitamin D level.     Lab tab checked to verify if result comment present with in each order: Yes    Letters tab checked to verify if lab result letter has been entered: Yes    OK to leave a message on voice mail? Yes    Advised patient response may take up to 2 business days: Yes    Primary language: English      needed? No    Call taken on January 23, 2019 at 3:34 PM by Ailyn Cesar to [facility]

## 2019-01-25 ENCOUNTER — OFFICE VISIT (OUTPATIENT)
Dept: OPHTHALMOLOGY | Facility: CLINIC | Age: 49
End: 2019-01-25
Payer: COMMERCIAL

## 2019-01-25 DIAGNOSIS — E11.9 DIABETES MELLITUS TYPE 2 WITHOUT RETINOPATHY (H): Primary | ICD-10-CM

## 2019-01-25 PROCEDURE — G0463 HOSPITAL OUTPT CLINIC VISIT: HCPCS | Mod: ZF

## 2019-01-25 ASSESSMENT — REFRACTION_WEARINGRX
OS_ADD: +2.00
OS_SPHERE: -2.50
OD_SPHERE: -2.00
OS_CYLINDER: +1.25
OD_CYLINDER: +1.50
OD_ADD: +2.00
OS_AXIS: 089
SPECS_TYPE: BIFOCAL
OD_AXIS: 097

## 2019-01-25 ASSESSMENT — TONOMETRY
OD_IOP_MMHG: 16
OS_IOP_MMHG: 14
IOP_METHOD: TONOPEN

## 2019-01-25 ASSESSMENT — VISUAL ACUITY
OS_CC: 20/25
CORRECTION_TYPE: GLASSES
OD_CC+: -2
OD_CC: 20/25
METHOD: SNELLEN - LINEAR
OS_CC+: -1

## 2019-01-25 ASSESSMENT — SLIT LAMP EXAM - LIDS
COMMENTS: NORMAL
COMMENTS: NORMAL

## 2019-01-25 ASSESSMENT — EXTERNAL EXAM - RIGHT EYE: OD_EXAM: NORMAL

## 2019-01-25 ASSESSMENT — CUP TO DISC RATIO
OD_RATIO: 0.6
OS_RATIO: 0.4

## 2019-01-25 ASSESSMENT — EXTERNAL EXAM - LEFT EYE: OS_EXAM: NORMAL

## 2019-01-25 NOTE — NURSING NOTE
Chief Complaints and History of Present Illnesses   Patient presents with     Diabetic Eye Exam     Chief Complaint(s) and History of Present Illness(es)     Diabetic Eye Exam     Vision: is stable    Associated symptoms: photophobia    Diabetes Type: Type 2    Duration: 1 year    Blood Sugars: is controlled              Comments     See grey spot BE and  see yellow spot at times  Blood sugar 140  Last A1C 6,.3 2 months ago  Margie Smith COA 9:45 AM January 25, 2019

## 2019-01-25 NOTE — PROGRESS NOTES
CC -   DIABETES MELLITUS exam    INTERVAL HISTORY - Initial visit    HPI -   Misty Girard is a  48 year old year-old patient with history of diabetes mellitus who presents for eye exam. Reports floaters which have been stable. Patient denies any changes to their vision, flashes, pain, redness, discharge, diplopia, or photophobia.    PAST OCULAR HISTORY  None    MEDICATIONS:  None    ASSESSMENT & PLAN  1. Diabetes mellitus with no retinopathy   A1c from 11/28/18 6.5   Maintain good bs, bp control   Follow up for yearly eye exams    2. Refracted error   Got new rx two weeks ago    3. NS both eyes   Early, non visually significant   Monitor    4. Asymmetric C:D   Right > left   IOP wnl   Monitor, obtain RNFL and VF next visit    Return to clinic 1 year for DFE    Seen and discussed with Dr. Kerrie Galvan MD  PGY-3 Ophthalmology Resident  [phone]      ~~~~~~~~~~~~~~~~~~~~~~~~~~~~~~~~~~~~~~~~~~~~~~~~~~~~~~~~~~~~~~~~    Complete documentation of historical and exam elements from today's encounter can be found in the full encounter summary report (not reduplicated in this progress note). I personally obtained the chief complaint(s) and history of present illness.  I confirmed and edited as necessary the review of systems, past medical/surgical history, family history, social history, and examination findings as documented by others.  I examined the patient myself, and I personally reviewed the relevant tests, images, and reports as documented above. I formulated and edited as necessary the assessment and plan and discussed the findings and management plan with the patient and family.     Landen Sy MD, MA  Director, Cornea & Anterior Segment  [facility] Department of Ophthalmology & Visual Neuroscience

## 2019-02-06 ENCOUNTER — TELEPHONE (OUTPATIENT)
Dept: FAMILY MEDICINE | Facility: CLINIC | Age: 49
End: 2019-02-06

## 2019-02-06 NOTE — TELEPHONE ENCOUNTER
[facility] phone call message- general phone call:    Reason for call: reschedule gene-sight testing. PCP and PharmD. Left message on Norman's voicemail ([place] [facility]). Also attempted to reach patient on cell number, no answer.    Action Desired: If Norman/patient do return call, please schedule gene sight testing with PCP and PharmD, per .    Primary language: English      needed? No    Call taken on February 6, 2019 at 8:58 AM by Autumn Dan    [facility] to [facility] TRIAGE

## 2019-02-12 ENCOUNTER — TELEPHONE (OUTPATIENT)
Dept: OPHTHALMOLOGY | Facility: CLINIC | Age: 49
End: 2019-02-12

## 2019-02-12 DIAGNOSIS — H04.129 DRYNESS OF EYE: Primary | ICD-10-CM

## 2019-02-12 NOTE — TELEPHONE ENCOUNTER
Reviewed OTC artificial tears-- solution  (methlycellulose) no brand name needed  Pharmacy verbally demonstrated understanding  Eren Rhodes RN 10:11 AM 02/12/19

## 2019-02-12 NOTE — TELEPHONE ENCOUNTER
Pt calling today with eye problem    Spoke to pt at 0928    Pt last seen on 1-25-19    Yesterday pt states looked up and right eye changed-- roomed moving around    Pt currently in treatment-- has anxiety per pt    Lasted about 3-5 seconds  And happened about 3 or 4 times    Vision back to normal    Complaint of dryness    Reviewed her eye diagnosis history  Reviewed no diabetic retinopathy and reviewed typically over years and years can develop  Pt A1C 6.5 in November  Reviewed cataracts not-visually significant and typically takes years and years to progress     Reviewed may try lubricating eye drops.  Pt may schedule eye exam for any reoccurrence  Pt comfortable trying tears for now  Needs Rx for artificial tears to [facility]-- pt in treatment    Rx sent  Eren Rhodes RN 9:38 AM 02/12/19    Note to dr. nickerson

## 2019-02-12 NOTE — TELEPHONE ENCOUNTER
Health Call Center    Phone Message    May a detailed message be left on voicemail: yes    Reason for Call: Medication Question or concern regarding medication   Prescription Clarification  Name of Medication: hydroxypropyl methylcellulose (GENTEAL) 0.2 % SOLN ophthalmic solution  Prescribing Provider:Jose Galvan   Pharmacy: Jun   What on the order needs clarification?Pharmacy called to change Rx a little. Please give a call today.          Action Taken: Message routed to:  [facility] Eye

## 2019-02-26 PROBLEM — F32.A DEPRESSION: Status: ACTIVE | Noted: 2019-02-26

## 2019-03-04 NOTE — TELEPHONE ENCOUNTER
Patient is calling to get a letter from Dr Blanton stating the needs of a SAD lamp.  [facility] phone call message- patient requesting a refill:    Full Medication Name: Sad Lamp    Dose: na    Pharmacy confirmed as   [facility] [address] - [place], [place] - [address]  [address]  [place] [address]  Phone: [phone] Fax: [phone]    [facility]  [address] [facility] [address] [facility] [address] [facility]  Phone: [phone] Fax: [phone]  : Yes    Additional Comments: Eugenie at [phone] is waiting on forms to be filled out by DR Blanton    OK to leave a message on voice mail? Yes    Primary language: English      needed? No    Call taken on March 4, 2019 at 4:32 PM by Bri Sanchez

## 2019-03-05 ENCOUNTER — TELEPHONE (OUTPATIENT)
Dept: PSYCHIATRY | Facility: CLINIC | Age: 49
End: 2019-03-05

## 2019-03-22 ENCOUNTER — OFFICE VISIT (OUTPATIENT)
Dept: FAMILY MEDICINE | Facility: CLINIC | Age: 49
End: 2019-03-22
Payer: COMMERCIAL

## 2019-03-22 ENCOUNTER — DOCUMENTATION ONLY (OUTPATIENT)
Dept: FAMILY MEDICINE | Facility: CLINIC | Age: 49
End: 2019-03-22

## 2019-03-22 VITALS
HEART RATE: 92 BPM | DIASTOLIC BLOOD PRESSURE: 87 MMHG | SYSTOLIC BLOOD PRESSURE: 128 MMHG | RESPIRATION RATE: 16 BRPM | TEMPERATURE: 99.4 F | OXYGEN SATURATION: 96 % | BODY MASS INDEX: 47.86 KG/M2 | WEIGHT: 287.6 LBS

## 2019-03-22 DIAGNOSIS — K43.9 VENTRAL HERNIA WITHOUT OBSTRUCTION OR GANGRENE: ICD-10-CM

## 2019-03-22 DIAGNOSIS — E11.9 CONTROLLED TYPE 2 DIABETES MELLITUS WITHOUT COMPLICATION, WITHOUT LONG-TERM CURRENT USE OF INSULIN (H): ICD-10-CM

## 2019-03-22 DIAGNOSIS — F15.21 METHAMPHETAMINE USE DISORDER, MODERATE, IN SUSTAINED REMISSION (H): ICD-10-CM

## 2019-03-22 DIAGNOSIS — Z97.5 IUD (INTRAUTERINE DEVICE) IN PLACE: ICD-10-CM

## 2019-03-22 DIAGNOSIS — M17.0 PRIMARY OSTEOARTHRITIS OF BOTH KNEES: ICD-10-CM

## 2019-03-22 DIAGNOSIS — E11.42 DIABETIC PERIPHERAL NEUROPATHY (H): ICD-10-CM

## 2019-03-22 DIAGNOSIS — Z00.00 HEALTHCARE MAINTENANCE: ICD-10-CM

## 2019-03-22 DIAGNOSIS — M54.42 CHRONIC BILATERAL LOW BACK PAIN WITH LEFT-SIDED SCIATICA: ICD-10-CM

## 2019-03-22 DIAGNOSIS — G89.29 CHRONIC BILATERAL LOW BACK PAIN WITH LEFT-SIDED SCIATICA: ICD-10-CM

## 2019-03-22 DIAGNOSIS — I10 HYPERTENSION, ESSENTIAL, BENIGN: Primary | ICD-10-CM

## 2019-03-22 DIAGNOSIS — E78.1 HYPERTRIGLYCERIDEMIA: ICD-10-CM

## 2019-03-22 DIAGNOSIS — F41.0 PANIC DISORDER WITHOUT AGORAPHOBIA: ICD-10-CM

## 2019-03-22 LAB
CHOLEST SERPL-MCNC: 145.8 MG/DL (ref 0–200)
CHOLEST/HDLC SERPL: 4.1 {RATIO} (ref 0–5)
HDLC SERPL-MCNC: 35.9 MG/DL
LDLC SERPL CALC-MCNC: 54 MG/DL (ref 0–129)
TRIGL SERPL-MCNC: 279.9 MG/DL (ref 0–150)
VLDL CHOLESTEROL: 56 MG/DL (ref 7–32)

## 2019-03-22 RX ORDER — LISINOPRIL 2.5 MG/1
1.25 TABLET ORAL DAILY
Qty: 30 TABLET | Refills: 3 | Status: SHIPPED | OUTPATIENT
Start: 2019-03-22 | End: 2019-05-06

## 2019-03-22 RX ORDER — PNV NO.95/FERROUS FUM/FOLIC AC 28MG-0.8MG
1000 TABLET ORAL DAILY PRN
Qty: 90 CAPSULE | Refills: 3 | Status: SHIPPED | OUTPATIENT
Start: 2019-03-22 | End: 2020-03-25

## 2019-03-22 RX ORDER — ATORVASTATIN CALCIUM 10 MG/1
10 TABLET, FILM COATED ORAL DAILY
Qty: 90 TABLET | Refills: 1 | Status: SHIPPED | OUTPATIENT
Start: 2019-03-22 | End: 2019-05-06

## 2019-03-22 NOTE — PROGRESS NOTES
"When opening a documentation only encounter, be sure to enter in \"Chief Complaint\" Forms and in \" Comments\" Title of form, description if needed.    Misty is a 48 year old  female  Form received via: Fax  Form now resides in: Provider Ready      Form has been completed by provider.     Form sent out via: Fax to [employer] at Fax Number: [phone]  Patient informed: na  Output date: March 22, 2019    Bri Sanchez CMA      **Please close the encounter**      "

## 2019-03-22 NOTE — PROGRESS NOTES
Preceptor Attestation:   Patient seen, evaluated and discussed with the resident. I have verified the content of the note, which accurately reflects my assessment of the patient and the plan of care.   Supervising Physician:  Jasmin Valadez MD

## 2019-03-22 NOTE — PATIENT INSTRUCTIONS
Here is the plan from today's visit    1. Hypertension, essential, benign    - lisinopril (PRINIVIL/ZESTRIL) 2.5 MG tablet; Take 0.5 tablets (1.25 mg) by mouth daily  Dispense: 30 tablet; Refill: 3  - Lipid [place] ([facility])    2. Diabetic peripheral neuropathy (H)    - metFORMIN (GLUCOPHAGE) 500 MG tablet; Take 2 tablets (1,000 mg) by mouth 2 times daily (with meals)  Dispense: 120 tablet; Refill: 1    3. Primary osteoarthritis of both knees    - diclofenac (VOLTAREN) 1 % topical gel; Place onto the skin 4 times daily  Dispense: 100 g; Refill: 3    4. Controlled type 2 diabetes mellitus without complication, without long-term current use of insulin (H)    - atorvastatin (LIPITOR) 10 MG tablet; Take 1 tablet (10 mg) by mouth daily  Dispense: 90 tablet; Refill: 1    5. Chronic bilateral low back pain with left-sided sciatica    - [facility], PT, HAND AND CHIROPRACTIC REFERRAL - C[facility]; Future    6. Ventral hernia without obstruction or gangrene    - order for DME; Equipment being ordered: abdominal binder  Dispense: 1 Units; Refill: 0    7. Healthcare maintenance    - Omega-3 Fatty Acids (FISH OIL OMEGA-3) 1000 MG CAPS; Take 1,000 mg by mouth daily as needed (prn)  Dispense: 90 capsule; Refill: 3  - Lipid [place] ([facility])      Please call or return to clinic if your symptoms don't go away.    Follow up plan  Please make a clinic appointment for follow up with me (HELLEN ARREGUIN) in 4  weeks for follow up.    Thank you for coming to [facility] today.  Lab Testing:  **If you had lab testing today and your results are reassuring or normal they will be mailed to you or sent through [employer] within 7 days.   **If the lab tests need quick action we will call you with the results.  The phone number we will call with results is # [phone] (home) . If this is not the best number please call our clinic and change the number.  Medication Refills:  If you need any refills please call your pharmacy and they will contact  us.   If you need to  your refill at a new pharmacy, please contact the new pharmacy directly. The new pharmacy will help you get your medications transferred faster.   Scheduling:  If you have any concerns about today's visit or wish to schedule another appointment please call our office during normal business hours [phone] (8-5:00 M-F)  If a referral was made to a [facility] and you don't get a call from central scheduling please call [phone].  If a Mammogram was ordered for you at [facility] call [phone] to schedule or change your appointment.  If you had an XRay/CT/Ultrasound/MRI ordered the number is [phone] to schedule or change your radiology appointment.   Medical Concerns:  If you have urgent medical concerns please call [phone] at any time of the day.    David Vera, DO

## 2019-03-22 NOTE — PROGRESS NOTES
HPI       Misty Girard is a 48 year old  who presents for   Chief Complaint   Patient presents with     Medication Request     would like to restart Lisinopril- disucss elevated blood pressure     Refill Request     lipitor, metformin, voltargen gel     Recheck Medication     would like to discuss stopping fish oil     Dme     abdominal binder to help with her hernia and back pain      Forms     [facility]      BP concerns:  - has noticed some consistently high readings at home up to 160/100 which she will treat with propranolol and/or hydroxyzine prn, which resolves but sometimes come back  - knows she shouldn't check her BP too much when anxious or excessively  - has worked to decrease her BP checks: had done up to 80 times per day, now she checks 0-6 times per day  - would like to re-start lisinopril, says she's only been on 1 mg before, wants to start v low because she's v sensitive to meds    Mental health/Sobriety:  - still sober, in [place] [facility]  - working hard with the program and her prns to manage her anxiety, done a lot of growth  - 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    Patient is an established patient of this clinic..         Review of Systems:   Review of Systems         Physical Exam:     Vitals:    03/22/19 1459   BP: 128/87   Pulse: 92   Resp: 16   Temp: 99.4  F (37.4  C)   TempSrc: Oral   SpO2: 96%   Weight: 130.5 kg (287 lb 9.6 oz)     Body mass index is 47.86 kg/m .  Vitals were  reviewed and were normal     Physical Exam   Constitutional: She is oriented to person, place, and time. She appears well-developed and well-nourished. No distress.   HENT:   Head: Normocephalic and atraumatic.   Pulmonary/Chest: Effort normal. No respiratory distress.   Neurological: She is alert and oriented to person, place, and time.   Psychiatric:   Mood and affect calm, still somewhat labile but improved from prior visits, insight and judgement improved, linear, logical         Results:      Results from this visit  Results for orders placed or performed in visit on 03/22/19   Lipid [place] ([facility])   Result Value Ref Range    Cholesterol 145.8 0.0 - 200.0 mg/dL    Cholesterol/HDL Ratio 4.1 0.0 - 5.0    HDL Cholesterol 35.9 (L) >40.0 mg/dL    LDL Cholesterol Calculated 54 0 - 129 mg/dL    Triglycerides 279.9 (H) 0.0 - 150.0 mg/dL    VLDL Cholesterol 56.0 (H) 7.0 - 32.0 mg/dL       Assessment and Plan      Misty Girard is a 49 yo F here for multiple concerns:    1. Hypertension, essential, benign  - lisinopril (PRINIVIL/ZESTRIL) 2.5 MG tablet; Take 0.5 tablets (1.25 mg) by mouth daily  Dispense: 30 tablet; Refill: 3  - Lipid [place] ([facility])    2. Controlled type 2 diabetes mellitus without complication, without long-term current use of insulin (H)  - plan to check A1C q 6 mo given good control (last 6.5 in Sept)  - atorvastatin (LIPITOR) 10 MG tablet; Take 1 tablet (10 mg) by mouth daily  Dispense: 90 tablet; Refill: 1  - metFORMIN (GLUCOPHAGE) 500 MG tablet; Take 2 tablets (1,000 mg) by mouth 2 times daily (with meals)  Dispense: 120 tablet; Refill: 1    3. Primary osteoarthritis of both knees  - diclofenac (VOLTAREN) 1 % topical gel; Place onto the skin 4 times daily  Dispense: 100 g; Refill: 3    4.  Chronic bilateral low back pain with left-sided sciatica  - [facility], PT, HAND AND CHIROPRACTIC REFERRAL - [facility]; Future    5. Ventral hernia without obstruction or gangrene  - order for DME; Equipment being  ordered: abdominal binder  Dispense: 1 Units; Refill: 0    6. Healthcare maintenance  - Omega-3 Fatty Acids (FISH OIL OMEGA-3) 1000 MG CAPS; Take 1,000 mg by mouth daily as needed (prn)  Dispense: 90 capsule; Refill: 3  - Lipid [place] ([facility])  - ok to take fish oil as prn (prn rx needed for [facility])    7. IUD in place  - ok to leave in and decrease risk of endometrial cancer  - it will not hurt to have in even though pt is post-menopausal    8. Methamphetamine use in remission  Panic disorder without agoraphobia  - congratulated patient on her hard work with her mental health and sobriety. Reaffirmed her choices to stay sober and stay in treatment.     Medications Discontinued During This Encounter   Medication Reason     metFORMIN (GLUCOPHAGE) 500 MG tablet Reorder     diclofenac (VOLTAREN) 1 % topical gel Reorder     atorvastatin (LIPITOR) 10 MG tablet Reorder     Omega-3 Fatty Acids (FISH OIL OMEGA-3) 1000 MG CAPS      Vitamin D, Cholecalciferol, 1000 units TABS        Options for treatment and follow-up care were reviewed with the patient. Misty Girard  engaged in the decision making process and verbalized understanding of the options discussed and agreed with the final plan.    David Vera DO

## 2019-04-04 ENCOUNTER — DOCUMENTATION ONLY (OUTPATIENT)
Dept: FAMILY MEDICINE | Facility: CLINIC | Age: 49
End: 2019-04-04

## 2019-04-04 NOTE — PROGRESS NOTES
"When opening a documentation only encounter, be sure to enter in \"Chief Complaint\" Forms and in \" Comments\" Title of form, description if needed.    Misty is a 48 year old  female  Form received via: Fax  Form now resides in: Provider Ready    ARLET Burnett 9:07 AM April 4, 2019      Per Dr. Vera she recently completed this, however I can not find it in the chart.   ARLET Burnett 3:52 PM April 9, 2019      Form has been completed by provider.     Form sent out via: Fax to [employer] at Fax Number: [phone]  Patient informed: No  Output date: April 23, 2019    ARLET Burnett 8:00 AM April 23, 2019    **Please close the encounter**                        "

## 2019-04-05 ENCOUNTER — OFFICE VISIT (OUTPATIENT)
Dept: FAMILY MEDICINE | Facility: CLINIC | Age: 49
End: 2019-04-05
Payer: COMMERCIAL

## 2019-04-05 VITALS
BODY MASS INDEX: 48.19 KG/M2 | HEART RATE: 84 BPM | OXYGEN SATURATION: 96 % | WEIGHT: 289.6 LBS | SYSTOLIC BLOOD PRESSURE: 113 MMHG | DIASTOLIC BLOOD PRESSURE: 79 MMHG

## 2019-04-05 DIAGNOSIS — G89.29 CHRONIC BILATERAL LOW BACK PAIN, WITH SCIATICA PRESENCE UNSPECIFIED: Primary | ICD-10-CM

## 2019-04-05 DIAGNOSIS — M99.9 NONALLOPATHIC LESION OF CERVICAL REGION: ICD-10-CM

## 2019-04-05 DIAGNOSIS — M99.00 SOMATIC DYSFUNCTION OF HEAD REGION: ICD-10-CM

## 2019-04-05 DIAGNOSIS — M99.9 NONALLOPATHIC LESION OF LUMBAR REGION: ICD-10-CM

## 2019-04-05 DIAGNOSIS — M99.9 NONALLOPATHIC LESION OF THORACIC REGION: ICD-10-CM

## 2019-04-05 DIAGNOSIS — M54.5 CHRONIC BILATERAL LOW BACK PAIN, WITH SCIATICA PRESENCE UNSPECIFIED: Primary | ICD-10-CM

## 2019-04-05 DIAGNOSIS — M99.9 NONALLOPATHIC LESION OF RIB CAGE: ICD-10-CM

## 2019-04-05 DIAGNOSIS — M99.9 NONALLOPATHIC LESION OF UPPER EXTREMITIES: ICD-10-CM

## 2019-04-05 NOTE — PROGRESS NOTES
"     ISSAC Jay is a 48 year old female who presents today for   Chief Complaint   Patient presents with     Back Pain     Patient has chronic back pain. She is looking for an alternative approach to treating her pain, instead of using medication. She prefers homeopathic treatments over medication, which she feels causes extreme anxiety or panic.    She has chronic low back pain from disc degeneration. At times she feels \"tingling in both toes,\" which resolves after she does regular stretching. She used to see a chiropractor many years ago, which helped. Other than stretching, she has not found other treatments that work for her.     Patient Active Problem List   Diagnosis     Generalized anxiety disorder     Secondary dysmenorrhea     Fatigue     Hypothyroidism     CARDIOVASCULAR SCREENING; LDL GOAL LESS THAN 160     Methamphetamine use (H)     Controlled type 2 diabetes mellitus without complication, without long-term current use of insulin (H)     Borderline personality disorder (H)     ACP (advance care planning)     Diabetic peripheral neuropathy (H)     Family history of coronary artery disease     History of tobacco abuse     Hypertension, essential, benign     Hypertriglyceridemia     Morbid obesity with BMI of 40.0-44.9, adult (H)     Osteoarthritis of left knee     Panic disorder without agoraphobia     PTSD (post-traumatic stress disorder)     Vitamin D deficiency     IUD (intrauterine device) in place     Depression     Current Outpatient Medications   Medication Sig Dispense Refill     acetaminophen (TYLENOL) 500 MG tablet Take 1-2 tablets (500-1,000 mg) by mouth every 8 hours as needed for mild pain 100 tablet 3     albuterol (PROAIR HFA/PROVENTIL HFA/VENTOLIN HFA) 108 (90 Base) MCG/ACT inhaler Inhale 2 puffs into the lungs every 4 hours as needed for shortness of breath / dyspnea or wheezing 2 Inhaler 3     aspirin 81 MG tablet Take 1 tablet (81 mg) by mouth daily 100 tablet 3     atorvastatin " (LIPITOR) 10 MG tablet Take 1 tablet (10 mg) by mouth daily 90 tablet 1     blood glucose (NO BRAND SPECIFIED) lancets standard Use to test blood sugar 1-2 times daily or as directed. 100 each 11     blood glucose monitoring (NO BRAND SPECIFIED) test strip Use to test blood sugars 2 times daily or as directed 100 strip 11     calcium polycarbophil (FIBERCON) 625 MG tablet Take 2 tablets (1,250 mg) by mouth daily 180 tablet 3     diclofenac (VOLTAREN) 1 % topical gel Place onto the skin 4 times daily 100 g 3     hydroxypropyl methylcellulose (GENTEAL) 0.2 % SOLN ophthalmic solution Place 1 drop into both eyes 4 times daily as needed for dry eyes 1 Bottle 5     levonorgestrel (MIRENA) 20 MCG/24HR IUD 1 Device by Intrauterine route       lisinopril (PRINIVIL/ZESTRIL) 2.5 MG tablet Take 0.5 tablets (1.25 mg) by mouth daily 30 tablet 3     Menthol-Methyl Salicylate (OCTAVIO CONTI GREASELESS) cream Apply topically every 6 hours as needed (pain) 113 g 3     metFORMIN (GLUCOPHAGE) 500 MG tablet Take 2 tablets (1,000 mg) by mouth 2 times daily (with meals) 120 tablet 1     metFORMIN (GLUCOPHAGE) 500 MG tablet Take 2 tablets (1,000 mg) by mouth 2 times daily (with meals) 60 tablet 3     nystatin (MYCOSTATIN) ointment Apply topically 2 times daily       Omega-3 Fatty Acids (FISH OIL OMEGA-3) 1000 MG CAPS Take 1,000 mg by mouth daily as needed (prn) 90 capsule 3     order for DME Equipment being ordered: Digital home blood pressure monitor kit with cuff 1 Device 0     order for DME Equipment being ordered: Aquaphor cream jar 2 Container 20     order for DME Equipment being ordered: Full spectrum 10,000 lux light box (Procedure code )  Will use 30 min every morning and afternoon in fall and winter months. 1 Units 0     Prenatal Vit-Fe Fumarate-FA (PRENATAL MULTIVITAMIN PLUS IRON) 27-0.8 MG TABS per tablet Take 1 tablet by mouth daily 100 tablet 3     propranolol (INDERAL) 10 MG tablet Take 0.5-1 tablets (5-10 mg) by mouth 3 times  daily as needed (anxiety) 90 tablet 3     spacer (OPTICHAMBER GENARO) holding chamber Holding/spacer device to use with inhaler. 1 each 0     order for DME Equipment being ordered: abdominal binder 1 Units 0       Active medical problems and medication list reviewed     No Known Allergies    Social History     Socioeconomic History     Marital status: Unknown     Spouse name: Not on file     Number of children: Not on file     Years of education: Not on file     Highest education level: Not on file   Occupational History     Not on file   Social Needs     Financial resource strain: Not on file     Food insecurity:     Worry: Not on file     Inability: Not on file     Transportation needs:     Medical: Not on file     Non-medical: Not on file   Tobacco Use     Smoking status: Never Smoker     Smokeless tobacco: Never Used   Substance and Sexual Activity     Alcohol use: No     Drug use: No     Sexual activity: Never     Partners: Male     Comment: none   Lifestyle     Physical activity:     Days per week: Not on file     Minutes per session: Not on file     Stress: Not on file   Relationships     Social connections:     Talks on phone: Not on file     Gets together: Not on file     Attends Congregational service: Not on file     Active member of club or organization: Not on file     Attends meetings of clubs or organizations: Not on file     Relationship status: Not on file     Intimate partner violence:     Fear of current or ex partner: Not on file     Emotionally abused: Not on file     Physically abused: Not on file     Forced sexual activity: Not on file   Other Topics Concern     Parent/sibling w/ CABG, MI or angioplasty before 65F 55M? Not Asked   Social History Narrative    Incarcerated 5 yrs (2005-10) [place] - Drug possession          Review of Systems:     Review of Systems   Constitutional: Negative for fever and weight loss.   Musculoskeletal: Positive for back pain. Negative for falls.   Neurological:  Positive for tingling. Negative for sensory change, focal weakness and weakness.               Physical Exam:     Vitals:    04/05/19 1330   BP: 113/79   Pulse: 84   SpO2: 96%   Weight: 131.4 kg (289 lb 9.6 oz)     Physical Exam   Constitutional: She appears well-developed and well-nourished. No distress.   Cardiovascular: Normal rate.   Pulmonary/Chest: Effort normal.   Skin: She is not diaphoretic.   Psychiatric: She has a normal mood and affect. Her behavior is normal. Judgment and thought content normal.   Vitals reviewed.    See OMT procedure note below for OMT physical exam    OMT Procedure Note:      Body Region: Head    Somatic Dysfunction #1: tightness and tenderness of suboccipital musculature  Treatment: Soft Tissue   Outcome: Improved    Body Region: Cervical    Somatic Dysfunction #1: tightness and tenderness of bilateral paraspinal musculature  Treatment: Soft Tissue   Outcome: Improved    Body Region: Upper Extremity    Somatic Dysfunction #1: tightness, tenderness, and tenderpoints of bilateral trapezius muscles  Treatment: Myofascial: direct   Outcome: Improved    Body Region: Ribs    Somatic Dysfunction #1: rib 1 (inlet) rotated right, SB right  Treatment: facilitated positional release   Outcome: Resolved    Body Region: Thoracic    Somatic Dysfunction #1: tightness and tenderness of bilateral paraspinal musculature  Treatment: Myofascial: direct and Soft Tissue   Outcome: Improved    Body Region: Lumbar    Somatic Dysfunction #1: tightness and tenderness of bilateral paraspinal musculature  Treatment: Myofascial: direct and Soft Tissue   Outcome: Improved    Somatic Dysfunction #2: lumbo-pelvic roll to the left  Treatment: muscle energy: direct   Outcome: Improved    Body Region: Pelvis    Somatic Dysfunction #1: anterior innominate on the right  Treatment: muscle energy: direct   Outcome: Improved    Somatic Dysfunction #2: posterior innominate on the left  Treatment: muscle energy: direct    Outcome: Improved    Assessment and Plan     1. Chronic bilateral low back pain, with sciatica presence unspecified  2. Somatic dysfunction of head region  3. Nonallopathic lesion of cervical region  4. Nonallopathic lesion of thoracic region  5. Nonallopathic lesion of lumbar region  6. Nonallopathic lesion of upper extremities  7. Nonallopathic lesion of rib cage  Patient feels decreased frequency with regular stretching - patient likely is performing nerve flossing without intending to, causing decreased irritation to sciatic nerves. Patient has no signs of nerve impingement on exam, normal strength and sensation, negative slump/straight leg raise tests. Provided patient with sciatic nerve flossing techniques, and advised to perform regularly. We discussed treating pain with OTC medications, but patient hesitant. Also reviewed use of heat or ice, and encouraged regular exercise.  - OSTEOPATHIC MANIP,5-6 BODY REGN    OMT completed without incident. Patient tolerated treatment well. Advised that pain is occasionally worse during the first 24 hours after treatment. Patient to return in 2-4 weeks or as needed for repeat OMT.     Radha Block, DO

## 2019-04-05 NOTE — PATIENT INSTRUCTIONS
- Drink lots of water  - Use ice if needed   - Use heat in 1-2 days if that feels nice  - Try stretching daily

## 2019-04-05 NOTE — PROGRESS NOTES
Preceptor Attestation:   Patient seen and discussed with the resident. Assessment and plan reviewed with resident and agreed upon.   Supervising Physician:  [facility]

## 2019-04-16 ASSESSMENT — ENCOUNTER SYMPTOMS
BACK PAIN: 1
WEIGHT LOSS: 0
TINGLING: 1
FEVER: 0
FALLS: 0
WEAKNESS: 0
FOCAL WEAKNESS: 0
SENSORY CHANGE: 0

## 2019-05-06 ENCOUNTER — ANCILLARY PROCEDURE (OUTPATIENT)
Dept: GENERAL RADIOLOGY | Facility: CLINIC | Age: 49
End: 2019-05-06
Attending: FAMILY MEDICINE
Payer: COMMERCIAL

## 2019-05-06 ENCOUNTER — OFFICE VISIT (OUTPATIENT)
Dept: FAMILY MEDICINE | Facility: CLINIC | Age: 49
End: 2019-05-06
Payer: COMMERCIAL

## 2019-05-06 VITALS
OXYGEN SATURATION: 97 % | BODY MASS INDEX: 46.54 KG/M2 | DIASTOLIC BLOOD PRESSURE: 86 MMHG | WEIGHT: 289.6 LBS | HEART RATE: 88 BPM | RESPIRATION RATE: 16 BRPM | SYSTOLIC BLOOD PRESSURE: 126 MMHG | HEIGHT: 66 IN | TEMPERATURE: 98.7 F

## 2019-05-06 DIAGNOSIS — R20.0 NUMBNESS AND TINGLING OF LEFT LEG: ICD-10-CM

## 2019-05-06 DIAGNOSIS — R20.2 NUMBNESS AND TINGLING IN LEFT HAND: ICD-10-CM

## 2019-05-06 DIAGNOSIS — F41.9 ANXIETY DISORDER, UNSPECIFIED TYPE: ICD-10-CM

## 2019-05-06 DIAGNOSIS — K59.00 CONSTIPATION, UNSPECIFIED CONSTIPATION TYPE: ICD-10-CM

## 2019-05-06 DIAGNOSIS — R20.0 NUMBNESS AND TINGLING IN LEFT HAND: ICD-10-CM

## 2019-05-06 DIAGNOSIS — E11.9 CONTROLLED TYPE 2 DIABETES MELLITUS WITHOUT COMPLICATION, WITHOUT LONG-TERM CURRENT USE OF INSULIN (H): ICD-10-CM

## 2019-05-06 DIAGNOSIS — I10 HYPERTENSION, ESSENTIAL, BENIGN: ICD-10-CM

## 2019-05-06 DIAGNOSIS — R20.2 NUMBNESS AND TINGLING OF LEFT LEG: ICD-10-CM

## 2019-05-06 DIAGNOSIS — M17.2 POST-TRAUMATIC OSTEOARTHRITIS OF BOTH KNEES: ICD-10-CM

## 2019-05-06 DIAGNOSIS — E11.9 TYPE 2 DIABETES MELLITUS WITHOUT COMPLICATION, WITHOUT LONG-TERM CURRENT USE OF INSULIN (H): ICD-10-CM

## 2019-05-06 DIAGNOSIS — L30.9 ECZEMA, UNSPECIFIED TYPE: Primary | ICD-10-CM

## 2019-05-06 DIAGNOSIS — I10 BENIGN ESSENTIAL HYPERTENSION: ICD-10-CM

## 2019-05-06 RX ORDER — PRENATAL VIT/IRON FUM/FOLIC AC 27MG-0.8MG
1 TABLET ORAL DAILY
Qty: 100 TABLET | Refills: 3 | Status: SHIPPED | OUTPATIENT
Start: 2019-05-06 | End: 2020-05-15

## 2019-05-06 RX ORDER — HYDROXYZINE PAMOATE 25 MG/1
25-50 CAPSULE ORAL 4 TIMES DAILY PRN
Qty: 90 CAPSULE | Refills: 3 | Status: SHIPPED | OUTPATIENT
Start: 2019-05-06

## 2019-05-06 RX ORDER — ATORVASTATIN CALCIUM 10 MG/1
20 TABLET, FILM COATED ORAL DAILY
Qty: 90 TABLET | Refills: 3 | Status: SHIPPED | OUTPATIENT
Start: 2019-05-06 | End: 2019-05-28

## 2019-05-06 RX ORDER — HYDROCORTISONE VALERATE CREAM 2 MG/G
CREAM TOPICAL 2 TIMES DAILY
Qty: 45 G | Refills: 1 | Status: SHIPPED | OUTPATIENT
Start: 2019-05-06

## 2019-05-06 RX ORDER — CALCIUM POLYCARBOPHIL 625 MG 625 MG/1
2 TABLET ORAL DAILY
Qty: 180 TABLET | Refills: 3 | Status: SHIPPED | OUTPATIENT
Start: 2019-05-06 | End: 2020-09-30

## 2019-05-06 RX ORDER — LISINOPRIL 2.5 MG/1
2.5 TABLET ORAL DAILY
Qty: 90 TABLET | Refills: 3 | Status: SHIPPED | OUTPATIENT
Start: 2019-05-06 | End: 2020-06-10

## 2019-05-06 ASSESSMENT — MIFFLIN-ST. JEOR: SCORE: 1947.43

## 2019-05-06 NOTE — PATIENT INSTRUCTIONS
Here is the plan from today's visit    1. Benign essential hypertension    - Prenatal Vit-Fe Fumarate-FA (PRENATAL MULTIVITAMIN W/IRON) 27-0.8 MG tablet; Take 1 tablet by mouth daily  Dispense: 100 tablet; Refill: 3    2. Type 2 diabetes mellitus without complication, without long-term current use of insulin (H)    - metFORMIN (GLUCOPHAGE) 500 MG tablet; Take 2 tablets (1,000 mg) by mouth 2 times daily (with meals)  Dispense: 60 tablet; Refill: 3  - blood glucose (NO BRAND SPECIFIED) test strip; Use to test blood sugars 2 times daily or as directed  Dispense: 100 strip; Refill: 11  - blood glucose (NO BRAND SPECIFIED) lancets standard; Use to test blood sugar 1-2 times daily or as directed.  Dispense: 100 each; Refill: 11  - aspirin (ASA) 81 MG tablet; Take 1 tablet (81 mg) by mouth daily  Dispense: 100 tablet; Refill: 3    3. Hypertension, essential, benign    - lisinopril (PRINIVIL/ZESTRIL) 2.5 MG tablet; Take 1 tablet (2.5 mg) by mouth daily  Dispense: 90 tablet; Refill: 3    4. Anxiety disorder, unspecified type    - hydrOXYzine (VISTARIL) 25 MG capsule; Take 1-2 capsules (25-50 mg) by mouth 4 times daily as needed for anxiety  Dispense: 90 capsule; Refill: 3    5. Constipation, unspecified constipation type    - calcium polycarbophil (FIBERCON) 625 MG tablet; Take 2 tablets (1,250 mg) by mouth daily  Dispense: 180 tablet; Refill: 3    6. Controlled type 2 diabetes mellitus without complication, without long-term current use of insulin (H)    - atorvastatin (LIPITOR) 10 MG tablet; Take 2 tablets (20 mg) by mouth daily  Dispense: 90 tablet; Refill: 3    7. Eczema, unspecified type  - I think this rash is like eczema  - apply the steroid cream twice a day until the rash is gone  - if it isn't getting better in 1 week or so, please come back to clinic  - protect areas with the steroid cream from the sun  - hydrocortisone (WESTCORT) 0.2 % external cream; Apply topically 2 times daily  Dispense: 45 g; Refill: 1    8.  Post-traumatic osteoarthritis of both knees  - I'll send you to PT for stretching and strengthening for a few weeks  - then i'll have you follow up with our sports medicine clinic  - they can see your old Xrays in Care Everywhere  - [facility] PT, HAND AND CHIROPRACTIC REFERRAL - [facility]; Future  - Sports Medicine Clinic-[facility] INTERNAL REFERRAL    9. Numbness and tingling in left hand  - we will get XRays to make sure the spine and neck nerves that go to the hands are ok  - we will have you follow up at sports medicine  - X-ray Cervical spine 2-3 vws; Future    10. Numbness and tingling of left leg  - same thing, but for the low back/leg  - X-ray lumbar spine 2-3 views*; Future      Please call or return to clinic if your symptoms don't go away.    Follow up plan  Please make a clinic appointment for follow up with me (HELLEN ARREGUIN) in 1  month for follow up.    Thank you for coming to [facility] today.  Lab Testing:  **If you had lab testing today and your results are reassuring or normal they will be mailed to you or sent through [employer] within 7 days.   **If the lab tests need quick action we will call you with the results.  The phone number we will call with results is # [phone] (home) . If this is not the best number please call our clinic and change the number.  Medication Refills:  If you need any refills please call your pharmacy and they will contact us.   If you need to  your refill at a new pharmacy, please contact the new pharmacy directly. The new pharmacy will help you get your medications transferred faster.   Scheduling:  If you have any concerns about today's visit or wish to schedule another appointment please call our office during normal business hours [phone] (8-5:00 M-F)  If a referral was made to a [facility] and you don't get a call from central scheduling please call [phone].  If a Mammogram was ordered for you at [facility] call  [phone] to schedule or change your appointment.  If you had an XRay/CT/Ultrasound/MRI ordered the number is [phone] to schedule or change your radiology appointment.   Medical Concerns:  If you have urgent medical concerns please call [phone] at any time of the day.    Agueda Vera, DO

## 2019-05-08 ENCOUNTER — TELEPHONE (OUTPATIENT)
Dept: FAMILY MEDICINE | Facility: CLINIC | Age: 49
End: 2019-05-08

## 2019-05-08 DIAGNOSIS — L30.9 ECZEMA, UNSPECIFIED TYPE: Primary | ICD-10-CM

## 2019-05-08 NOTE — TELEPHONE ENCOUNTER
Prior Authorization Retail Medication Request    Medication/Dose: Hydrocortisone valerate 0.2%  ICD code (if different than what is on RX):  Eczema, unspecified type [L30.9]  - Primary   Previously Tried and Failed:  See Chart  Rationale:  See Chart    Insurance Name:  [facility]  Insurance ID:  [id number]       Pharmacy Information (if different than what is on RX)  Name:  [facility]  Phone:  [phone]

## 2019-05-09 ENCOUNTER — TELEPHONE (OUTPATIENT)
Dept: FAMILY MEDICINE | Facility: CLINIC | Age: 49
End: 2019-05-09

## 2019-05-09 NOTE — TELEPHONE ENCOUNTER
Central Prior Authorization Team   Phone: [phone]      PA Initiation    Medication: Hydrocortisone valerate 0.2%-PA Pending  Insurance Company: [employer] - Phone [phone] Fax [phone]  Pharmacy Filling the Rx: [facility] [place] [facility] - [place], [place] - [address] [place] [address], [address]  Filling Pharmacy Phone: [phone]  Filling Pharmacy Fax: [phone]  Start Date: 5/9/2019

## 2019-05-09 NOTE — TELEPHONE ENCOUNTER
"Returned phone call to patient listed phone numbers to follow recent reported \"low blood pressure\" and medication adjustment.  Unable to get hold off the patient, left voice message.    When Patient returns phone call please transfer to RN.    Message routed to PCP to advise dosage adjustment if appropriate.    Unique Storm RN    "

## 2019-05-09 NOTE — LETTER
May 15, 2019    Misty Girard  [facility]  [address] [facility] [address]      Dear: Misty Girard    You were recently referred for a Sports Medicine appointment by your primary care provider at [facility].  We have called twice to schedule this appointment, but have been unable to reach you.  If you are interested in receiving this service, please call [facility] at [phone].  We would be happy to schedule this appointment for you.      Thank you.      Sincerely,    Patient Representative    [facility]  Hours:  Monday-Friday 8:00 am - 5:00 pm   Phone Number: [phone]

## 2019-05-09 NOTE — LETTER
Medication order change:  Atorvastatin (Lipitor) 10 mg- Take 1 tab(10 mg) in the AM and one tab(10 mg) in the PM.    Lisinopril (Prinvil/Zestril) 2.5 mg- Take 0.5 tab (1.25 mg) in the AM and 0.5 tab (1.25 mg) in the PM.    Agueda Vera MD/ Alvin Jacob MD (covering for Ch[facility])

## 2019-05-09 NOTE — TELEPHONE ENCOUNTER
Attempted to reach patient to schedule appointment with Dr. Kumari in Sports Medicine per referral from 5/6/19. If patient returns clinic call, please schedule appointment.

## 2019-05-09 NOTE — TELEPHONE ENCOUNTER
[facility] phone call message- medication clarification/question:    Full Medication Name: 1) atorvastatin (LIPITOR) 10 MG tablet, and 2) lisinopril (PRINIVIL/ZESTRIL) 2.5 MG tablet    Dose: 1) Route: Take 2 tablets (20 mg) by mouth daily - Oral, and 2) Route: Take 1 tablet (2.5 mg) by mouth daily - Oral    Question/Clarification needed: Patient was taking a double dose of Lipitor, she would like to go back to the original dose of one pill a day.  Patient was taking a double dose of lisinopril, but now her PB is too low so now she wants to take one dose in am (1.25) and one in the pm.  She needs Dr. Vera to okay this.    Pharmacy confirmed as:   [facility] [place] [facility] - [place], [place] - [address], [address]  [address] [place] [address] [address]  [place] [address]  Phone: [phone] Fax: [phone]    : Yes    Please leave ONLY preferred pharmacy    OK to leave a message on voice mail? Yes    Advised patient that RN would call back within 3 hours, unless emergent.    Primary language: English      needed? No    Call taken on May 9, 2019 at 4:53 PM by Mica Cesar to [facility] ABBIE

## 2019-05-10 NOTE — TELEPHONE ENCOUNTER
Rec'd voicemail from Mikaela with [employer] that P/A is denied and paperwork should be coming by fax.

## 2019-05-10 NOTE — TELEPHONE ENCOUNTER
"Called patient to follow up and relay message from  Provider in regards to patient's lisinopril and Lipitor. . Per PCP \" Yes, it's ok for her to divide the lisinopril dosing to twice a day (1.25 mg am and 1.25 mg pm). She can divide the statin that way as well if she wants. You may have to call the main [facility] # to reach her if her listed # doesn't work (she still lives in their house). She just needs a lot of reassurance and sense of control, but I'm not concerned that she's having true hypotension by increasing her lisinopril by such a miniscule amount, especially since we've come down on her prn vistaril and propranolol' Schwlyndaa, DO.    When patient returns phone call please relay message or transfer to RN.    Unique Storm RN    "

## 2019-05-10 NOTE — TELEPHONE ENCOUNTER
Patient returned call and was transferred over to RN. RN relayed the message from PCP that it is okay for her to divide her doses. Patient stated that [facility] needs to be updated in order for her to receive proper dosing at [facility].    RN contacted [facility] and gave verbal order to change both medications.    Patient informed RN that the PA was denied for the hydrocortisone cream and she needs an alternative sent over.     Routing to PCP to send an alternative medication. Pharmacy pended.    Nargis Pino RN

## 2019-05-10 NOTE — TELEPHONE ENCOUNTER
Patient called back. I could not locate RN. Patient asked to have RN fax the revised medication orders to her nurse at [facility] at [phone].

## 2019-05-10 NOTE — TELEPHONE ENCOUNTER
Patient called back to talk to RN. RN was not available and patient hung up before I could get back to her.

## 2019-05-13 NOTE — TELEPHONE ENCOUNTER
PRIOR AUTHORIZATION DENIED    Medication: Hydrocortisone valerate 0.2%-DENIED    Denial Date: 5/10/2019    Denial Rational:           Appeal Information:

## 2019-05-15 NOTE — TELEPHONE ENCOUNTER
Called Claudia at [facility] to schedule sports medicine appointment. No answer, left voicemail. Sending letter.    Reason: bilateral knee pain, OA, persistent baker's cysts  Urgency of Appointment: Next Available  Length of Problem: Chronic (3 months or longer since onset): Ordering Provider - please ensure that radiographs or imaging is available within the past 12 months or obtain new imaging if concern for bone or joint.- XR in care everywhere  What are you requesting be done? Management Recommendations

## 2019-05-16 RX ORDER — FLUOCINOLONE ACETONIDE 0.1 MG/G
CREAM TOPICAL 2 TIMES DAILY
Qty: 60 G | Refills: 1 | Status: SHIPPED | OUTPATIENT
Start: 2019-05-16 | End: 2019-05-28

## 2019-05-16 NOTE — TELEPHONE ENCOUNTER
Prior Authorization:     Denied Reason: see chart    Alternatives: None available    Pharmacy notified.  Routing to MD    Please advise if you would like to move forward with the appeal process or plan to  prescribe an alternative medication. If Appeal is desired a letter of medical necessity with denial rationale is needed to start the appeal process.   Route to PA Pool when completed.    ARLET Burnett 8:05 AM May 16, 2019

## 2019-05-16 NOTE — TELEPHONE ENCOUNTER
Fluocinolone 0.01% cream ordered as alternative for eczematous rash of knees (P5 - mid/low potency topical steroid). Please call patient to inform of new cream prescribed.    DO [facility] Resident PGY-1  [phone]

## 2019-05-17 NOTE — TELEPHONE ENCOUNTER
Left message at [facility] for pt to call us back at clinic regarding message below. Just need to inform her.   ARLET Burnett 10:22 AM May 17, 2019

## 2019-05-28 ENCOUNTER — OFFICE VISIT (OUTPATIENT)
Dept: FAMILY MEDICINE | Facility: CLINIC | Age: 49
End: 2019-05-28
Payer: COMMERCIAL

## 2019-05-28 VITALS
SYSTOLIC BLOOD PRESSURE: 124 MMHG | DIASTOLIC BLOOD PRESSURE: 83 MMHG | OXYGEN SATURATION: 97 % | RESPIRATION RATE: 16 BRPM | HEART RATE: 86 BPM | WEIGHT: 292.6 LBS | BODY MASS INDEX: 47.95 KG/M2

## 2019-05-28 DIAGNOSIS — L30.9 ECZEMA, UNSPECIFIED TYPE: ICD-10-CM

## 2019-05-28 DIAGNOSIS — R93.89 ABNORMAL X-RAY: Primary | ICD-10-CM

## 2019-05-28 DIAGNOSIS — E78.1 HYPERTRIGLYCERIDEMIA: ICD-10-CM

## 2019-05-28 RX ORDER — ATORVASTATIN CALCIUM 10 MG/1
20 TABLET, FILM COATED ORAL AT BEDTIME
Qty: 90 TABLET | Refills: 3 | Status: SHIPPED | OUTPATIENT
Start: 2019-05-28 | End: 2019-06-10

## 2019-05-28 RX ORDER — FLUOCINOLONE ACETONIDE 0.1 MG/G
CREAM TOPICAL PRN
Qty: 60 G | Refills: 1 | Status: SHIPPED | OUTPATIENT
Start: 2019-05-28 | End: 2019-12-06

## 2019-05-28 NOTE — PATIENT INSTRUCTIONS
Here is the plan from today's visit    1. Eczema, unspecified type  - fluocinolone (SYNALAR) 0.01 % external cream; Apply topically as needed (for eczema)  Dispense: 60 g; Refill: 1    2. Controlled type 2 diabetes mellitus without complication, without long-term current use of insulin (H)  - atorvastatin (LIPITOR) 10 MG tablet; Take 2 tablets (20 mg) by mouth At Bedtime  Dispense: 90 tablet; Refill: 3    3. Abnormal x-ray of lung  - XR CHEST 2 VW; Future    4. Numbness and tingling    Schedule physical tehrapy in one of these locations     Physical Therapy, Hand Therapy and Chiropractic Care are available through:  *[facility]  *Hand Therapy (Occupational Therapy or Physical Therapy)  *[facility]      Call one number to schedule at any of the above locations: [phone].     Physical therapy, Hand therapy and/or Chiropractic care has been recommended by your physician as an excellent treatment option to reduce pain and help people return to normal activities, including sports.  Therapy and/or chiropractic care services are a great complement or alternative to expensive and invasive surgery, injections, or long-term use of prescription medications. The primary goal is to identify the underlying problem and provide you the tools to manage your condition on your own.      Please be aware that coverage of these services is subject to the terms and limitations of your health insurance plan.  Call member services at your health plan with any benefit or coverage questions.       Please bring the following to your appointment:  *Your personal calendar for scheduling future appointments  *Comfortable clothing        Follow up plan  Please make a clinic appointment for follow up with your primary physician Agueda Vera, DO the day of your CXR     Thank you for coming to [facility] today.  Lab Testing:  **If you had lab testing today and your results are  reassuring or normal they will be mailed to you or sent through GrabInbox within 7 days.   **If the lab tests need quick action we will call you with the results.  The phone number we will call with results is # [phone] (home) . If this is not the best number please call our clinic and change the number.  Medication Refills:  If you need any refills please call your pharmacy and they will contact us.   If you need to  your refill at a new pharmacy, please contact the new pharmacy directly. The new pharmacy will help you get your medications transferred faster.   Scheduling:  If you have any concerns about today's visit or wish to schedule another appointment please call our office during normal business hours [phone] (8-5:00 M-F)  If a referral was made to a [facility] and you don't get a call from central scheduling please call [phone].  If a Mammogram was ordered for you at [facility] call [phone] to schedule or change your appointment.  If you had an XRay/CT/Ultrasound/MRI ordered the number is [phone] to schedule or change your radiology appointment.   Medical Concerns:  If you have urgent medical concerns please call [phone] at any time of the day.    Jen Kumari MD

## 2019-05-28 NOTE — PROGRESS NOTES
SUBJECTIVE: Misty Girard is a 48 year old female with anxiety disorder, DM, post-traumatic bilateral knee OA, here to discuss recent workup. Imaging w/evidence of cervical and lumbar spondylosis, jacob and retrolisthesis.     Patient extremely anxious about findings on X-ray of cervical spine, including cervical spine changes and engorged vessels in lung apices.     No acute complaints. Persistent intermittent left hand and leg tingling.     PAST MEDICAL, SOCIAL, SURGICAL AND FAMILY HISTORY: Past medical, surgical, family and social history was reviewed and unchanged since last visit.    ALLERGIES: She has No Known Allergies.    CURRENT MEDICATIONS: She has a current medication list which includes the following prescription(s): acetaminophen, albuterol, aspirin, atorvastatin, blood glucose, blood glucose, calcium polycarbophil, diclofenac, fluocinolone, hydrocortisone, hydroxypropyl methylcellulose, hydroxyzine, levonorgestrel, lisinopril, samantha calle greaseless, metformin, nystatin, fish oil omega-3, order for dme, order for dme, order for dme, order for dme, prenatal multivitamin w/iron, propranolol, and spacer.     REVIEW OF SYSTEMS: complete review of systems is negative except as noted above.    EXAM:  /83   Pulse 86   Resp 16   Wt 132.7 kg (292 lb 9.6 oz)   LMP  (LMP Unknown)   SpO2 97%   BMI 47.95 kg/m      General: very pleasant white middle-age female. Alert, interactive. NAD.     HEENT: No signs of trauma. No rhinorrhea. Moist membranes.   Eyes: Conjunctivae are normal. Pupils are equal.   Neck: Normal range of motion.    Resp: No respiratory distress.    MSK: no knee or lower leg edema. No obvious deformity.  Neuro: The patient is alert and interactive. Speech normal. No gross focal findings.   Skin: No lesion or sign of trauma noted.   Psych: Affect is anxious and concordant with mood, behavior is normal.     ASSESSMENT/PLAN:  Discussed interpretation of previous imaging, re-assured her that,  since a C-spine X-ray is not meant to evaluate lung tissue, the significant of that X-ray is not clear. We will obtain CXR, but unlikely to be pathologic. She will follow-up w/PCP regarding these.  Nodular density noted on CXR, consider CT thoracic spine    Additionally, we went over other imaging findings, and re-assured her that there is no evidence of malignancy or severe pathology. She will continue with current plan to do PT for her bilateral knee OA, and will f/u w/PCP.      Refilled topical steroid and statin per patient's request.     Isabel Viveros MD  G1     Patient seen, evaluated and discussed with the resident. I have verified the content of the note, which accurately reflects my assessment of the patient and the plan of care.   Supervising Physician:  Jen Kumari MD

## 2019-05-29 ENCOUNTER — OFFICE VISIT (OUTPATIENT)
Dept: ENDOCRINOLOGY | Facility: CLINIC | Age: 49
End: 2019-05-29
Payer: COMMERCIAL

## 2019-05-29 ENCOUNTER — TELEPHONE (OUTPATIENT)
Dept: FAMILY MEDICINE | Facility: CLINIC | Age: 49
End: 2019-05-29

## 2019-05-29 ENCOUNTER — TELEPHONE (OUTPATIENT)
Dept: ENDOCRINOLOGY | Facility: CLINIC | Age: 49
End: 2019-05-29

## 2019-05-29 ENCOUNTER — ANCILLARY PROCEDURE (OUTPATIENT)
Dept: GENERAL RADIOLOGY | Facility: CLINIC | Age: 49
End: 2019-05-29
Attending: FAMILY MEDICINE
Payer: COMMERCIAL

## 2019-05-29 VITALS
BODY MASS INDEX: 47.09 KG/M2 | WEIGHT: 293 LBS | SYSTOLIC BLOOD PRESSURE: 112 MMHG | HEIGHT: 66 IN | DIASTOLIC BLOOD PRESSURE: 77 MMHG | HEART RATE: 83 BPM

## 2019-05-29 DIAGNOSIS — E11.9 TYPE 2 DIABETES MELLITUS WITHOUT COMPLICATION, WITHOUT LONG-TERM CURRENT USE OF INSULIN (H): Primary | ICD-10-CM

## 2019-05-29 DIAGNOSIS — R91.8 ABNORMAL X-RAY OF LUNG: ICD-10-CM

## 2019-05-29 LAB — HBA1C MFR BLD: 7.7 % (ref 4.3–6)

## 2019-05-29 RX ORDER — LIRAGLUTIDE 6 MG/ML
INJECTION SUBCUTANEOUS
Qty: 9 ML | Refills: 1 | Status: SHIPPED | OUTPATIENT
Start: 2019-05-29 | End: 2019-06-17

## 2019-05-29 ASSESSMENT — MIFFLIN-ST. JEOR: SCORE: 1965.12

## 2019-05-29 NOTE — PATIENT INSTRUCTIONS
Start Liraglutide(Victoza) 0.6 mg daily  Increase the dose to 1.2 mg daily after four weeks.   Liraglutide    Liraglutide or victoza  What side effects may I notice from receiving this medicine?  Side effects that you should report to your doctor or health care professional as soon as possible:    allergic reactions like skin rash, itching or hives, swelling of the face, lips, or tongue    breathing problems    fever, chills    loss of appetite    signs and symptoms of low blood sugar such as feeling anxious, confusion, dizziness, increased hunger, unusually weak or tired, sweating, shakiness, cold, irritable, headache, blurred vision, fast heartbeat, loss of consciousness    trouble passing urine or change in the amount of urine    unusual stomach pain or upset    vomiting  Side effects that usually do not require medical attention (Report these to your doctor or health care professional if they continue or are bothersome.):    diarrhea    headache    nausea  This list may not describe all possible side effects. Call your doctor for medical advice about side effects. You may report side effects to FDA at 7-754-FDA-0436.  Where should I keep my medicine?  Keep out of the reach of children.  Store unopened pen in a refrigerator between 2 and 8 degrees C (36 and 46 degrees F). Do not freeze or use if the medicine has been frozen. Protect from light and excessive heat. After you first use the pen, it can be stored at room temperature between 15 and 30 degrees C (59 and 86 degrees F) or in a refrigerator. Throw away your used pen after 30 days or after the expiration date, whichever comes first.  Do not store your pen with the needle attached. If the needle is left on, medicine may leak from the pen.      Weight loss measures:   Caloric restriction, portion control and physical activity are evidence based strategies for life style changes. Here is a starting point in your case:       Reduced calorie meal plan; Goal is  to lose 5-10% weight in the next six months.     Please keep a food journal of what you eat, calories in what you eat, and bring the journal with you to your next appointment.    Consider using applications for smart phones such as "[employer]", CloudAptitude, [employer], LoseIt, Tap&Track, and RelaxM. To keep food journal and track your exercise.     Focus on wet volumetrics, meaning, eat more foods that are high in water and fiber such as fruits and vegetables in order to get that full feeling. These are also good for your overall health as well.    Progressively increase physical activity to 45 minutes, including combination of cardio & resistance training x 5 days weekly. Start at 10-15 minutes per day and progressively work towards this goal.     Some insurance covers programs like weight watchers and I encourage you to contact your insurance.  Some individuals find programs like weight watchers helpful in their journey to a healthy life style.

## 2019-05-29 NOTE — TELEPHONE ENCOUNTER
liraglutide (VICTOZA PEN) 18 MG/3ML solution 9 mL 1 5/29/2019  --   Sig: Inject 0.6 mg daily and increase to 1.2 mg daily after four weeks.     Nurse teaching given on Victoza administration and the patient expresses understanding and acceptance of instructions with return demonstration and no questions or concerns at this time. Genoveva Ramírez 5/29/2019 2:04 PM

## 2019-05-29 NOTE — LETTER
5/29/2019       RE: Misty Girard  [address] [facility] [address]     Dear Colleague,    Thank you for referring your patient, Misty Girard, to the [facility]. Please see a copy of my visit note below.    Endocrinology Clinic Visit    Chief Complaint: RECHECK (Type 2 Diabetes)     Information obtained from:Patient    Subjective:         HPI: Misty Girard is a 48 year old female with history of type 2 diabetes who is here for routine follow up.      History of substance abuse with methamphetamine for 20+ years currently at [facility] on treatment since July 2018.     Diagnosed with diabetes about a year ago.  Started on metformin she is currently taking metformin 1000 international units twice daily.  Her A1c in September 2018 was 6.7 and have another reading in 11/2018 which was 6.5.  Hemoglobin is in the normal limits. Today her A1C was 7.7. Average blood glucose over the last 14 days has been 170's.     She does not have any side effects from metformin.  She reports that she was initially diagnosed with diabetes while she was in [place] however she was reluctant to start medication until few months ago.    Blood sugar readings were downloaded.  She has been checking her blood sugars at least 2-3 times per day.  Her blood sugars are not consistently checked fasting but all the blood sugars we have are as follows over the last 1 week has been in the 160's and 170's.     Her main concern today is - anxious about the results of x-ray -chest she just had.     She has been having numbness and tingling of the left hand for which an x-ray of the cervical spine was obtained. The cervical spine showed possible changes in the apices of the lung which led to x-ray. The x-ray-chest report was not available.  Discussed to follow up with her providers.       No Known Allergies    Current Outpatient Medications   Medication Sig Dispense Refill      liraglutide (VICTOZA PEN) 18 MG/3ML solution Inject 0.6 mg daily and increase to 1.2 mg daily after four weeks. 9 mL 1     acetaminophen (TYLENOL) 500 MG tablet Take 1-2 tablets (500-1,000 mg) by mouth every 8 hours as needed for mild pain 100 tablet 3     albuterol (PROAIR HFA/PROVENTIL HFA/VENTOLIN HFA) 108 (90 Base) MCG/ACT inhaler Inhale 2 puffs into the lungs every 4 hours as needed for shortness of breath / dyspnea or wheezing 2 Inhaler 3     aspirin (ASA) 81 MG tablet Take 1 tablet (81 mg) by mouth daily 100 tablet 3     atorvastatin (LIPITOR) 10 MG tablet Take 2 tablets (20 mg) by mouth At Bedtime 90 tablet 3     blood glucose (NO BRAND SPECIFIED) lancets standard Use to test blood sugar 1-2 times daily or as directed. 100 each 11     blood glucose (NO BRAND SPECIFIED) test strip Use to test blood sugars 2 times daily or as directed 100 strip 11     calcium polycarbophil (FIBERCON) 625 MG tablet Take 2 tablets (1,250 mg) by mouth daily 180 tablet 3     diclofenac (VOLTAREN) 1 % topical gel Place onto the skin 4 times daily 100 g 3     fluocinolone (SYNALAR) 0.01 % external cream Apply topically as needed (for eczema) 60 g 1     hydrocortisone (WESTCORT) 0.2 % external cream Apply topically 2 times daily 45 g 1     hydroxypropyl methylcellulose (GENTEAL) 0.2 % SOLN ophthalmic solution Place 1 drop into both eyes 4 times daily as needed for dry eyes 1 Bottle 5     hydrOXYzine (VISTARIL) 25 MG capsule Take 1-2 capsules (25-50 mg) by mouth 4 times daily as needed for anxiety 90 capsule 3     levonorgestrel (MIRENA) 20 MCG/24HR IUD 1 Device by Intrauterine route       lisinopril (PRINIVIL/ZESTRIL) 2.5 MG tablet Take 1 tablet (2.5 mg) by mouth daily 90 tablet 3     Menthol-Methyl Salicylate (OCTAVIO CONTI GREASELESS) cream Apply topically every 6 hours as needed (pain) 113 g 3     metFORMIN (GLUCOPHAGE) 500 MG tablet Take 2 tablets (1,000 mg) by mouth 2 times daily (with meals) 60 tablet 3     nystatin (MYCOSTATIN)  ointment Apply topically 2 times daily       Omega-3 Fatty Acids (FISH OIL OMEGA-3) 1000 MG CAPS Take 1,000 mg by mouth daily as needed (prn) 90 capsule 3     order for DME Equipment being ordered: abdominal binder 1 Units 0     order for DME Equipment being ordered: Digital home blood pressure monitor kit with cuff 1 Device 0     order for DME Equipment being ordered: Aquaphor cream jar 2 Container 20     order for DME Equipment being ordered: Full spectrum 10,000 lux light box (Procedure code )  Will use 30 min every morning and afternoon in fall and winter months. 1 Units 0     Prenatal Vit-Fe Fumarate-FA (PRENATAL MULTIVITAMIN W/IRON) 27-0.8 MG tablet Take 1 tablet by mouth daily 100 tablet 3     propranolol (INDERAL) 10 MG tablet Take 0.5-1 tablets (5-10 mg) by mouth 3 times daily as needed (anxiety) 90 tablet 3     spacer (OPTICHAMBER GENARO) holding chamber Holding/spacer device to use with inhaler. 1 each 0       Review of Systems     8 point review system (Constitutional, HENT, Eyes, Respiratory, Cardiovascular, Gastrointestinal, Genitourinary, Musculoskeletal,Neurological, Psychiatric/Behavioural, Endocrine) is negative or is as per HPI above except for numbness and tingling involving the hands.      Past Medical History:   Diagnosis Date     Elevated BP 1/27/2010    Following on/off propranalol     Fatigue 1/27/2010    Chronic fatigue syndrome?     Generalised anxiety disorder 1/27/2010    Propranalol as needed  Starting citalopram 1/10     Hypothyroidism 1/27/2010    50 mcg - 1/10     Secondary dysmenorrhea 1/27/2010       History reviewed. No pertinent surgical history.    Family History   Problem Relation Age of Onset     Diabetes Mother      C.A.D. Father      Hypertension Father        Social History     Social History     Marital status: Unknown     Spouse name: N/A     Number of children: N/A     Years of education: N/A     Social History Main Topics     Smoking status: Never Smoker      "Smokeless tobacco: Never Used     Alcohol use No     Drug use: No     Sexual activity: No      Comment: none     Other Topics Concern     None     Social History Narrative    Incarcerated 5 yrs (2005-10) [place] - Drug possession       Objective:   /77   Pulse 83   Ht 1.664 m (5' 5.5\")   Wt 133.1 kg (293 lb 8 oz)   LMP  (LMP Unknown)   BMI 48.10 kg/m     Constitutional: Pleasant no acute cardiopulmonary distress.   EYES: anicteric, normal extra-ocular movements, no lid lag or retraction.  HEENT: Mouth/Throat: Mucous membrane is moist. Oropharynx is clear.   Cardiovascular: RRR, S1, S2 normal. No m/g/r   Pulmonary/Chest: CTAB. No wheezing or rales   Abdominal: +BS. Non tender to palpation. Stretch marks.   Neurological: Alert and oriented. No tremor.  Extremities: No edema.   Psychological: appropriate mood and affect     In House Labs:   Lab Results   Component Value Date    A1C 6.7 09/21/2018       TSH   Date Value Ref Range Status   09/21/2018 2.47 0.40 - 4.00 mU/L Final   01/27/2010 1.13 0.4 - 5.0 mU/L Final     T4 Free   Date Value Ref Range Status   01/27/2010 1.32 0.70 - 1.85 ng/dL Final       Creatinine   Date Value Ref Range Status   11/15/2018 0.7 0.5 - 1.0 mg/dL Final   ]      Assessment/Treatment Plan:      Type 2 diabetes with worsening hyperglycemia since I saw her three months ago/likely related to her weight gain:  A1c was 7.7 last week.   Continue current management with metformin. Add Victoza 0.6 mg daily and will be increased to 1.2 mg daily after four weeks.  Discussed side effects of liraglutide or Victoza including nausea and other GI side effects.  Patient does not have history of pancreatitis or personal or family history of medullary thyroid cancer.  Written information on the liraglutide including side effects provided to the patient.  Adding Victoza will be helpful also from obesity standpoint.  Chronic complications  No diabetic retinopathy per report   Blood pressure within " the normal range  Mild peripheral neuropathy  Non-smoker  Currently on a statin  No microalbuminuria    Morbid obesity with a BMI of Body mass index is 48.1 kg/m .  Significant weight increase over the last 3 months  She is very much interested in working on losing weight.  She has been trying lifestyle changes.  Additional information on lifestyle changes provided written instructions below.  Referral to provided at her last visit.  Weight management clinic will prescribe Sax[place] at her last visit however was not covered by her insurance.  Down the line she wants also to consider bariatric surgery possibly sleeve gastrectomy and she reports that once she is discharged from the current treatment facility she would make an appointment with the weight management clinic.  In the meantime we have started her on Victoza as discussed under #1.       Patient Instructions   Start Liraglutide(Victoza) 0.6 mg daily  Increase the dose to 1.2 mg daily after four weeks.   Liraglutide    Liraglutide or victoza  What side effects may I notice from receiving this medicine?  Side effects that you should report to your doctor or health care professional as soon as possible:    allergic reactions like skin rash, itching or hives, swelling of the face, lips, or tongue    breathing problems    fever, chills    loss of appetite    signs and symptoms of low blood sugar such as feeling anxious, confusion, dizziness, increased hunger, unusually weak or tired, sweating, shakiness, cold, irritable, headache, blurred vision, fast heartbeat, loss of consciousness    trouble passing urine or change in the amount of urine    unusual stomach pain or upset    vomiting  Side effects that usually do not require medical attention (Report these to your doctor or health care professional if they continue or are bothersome.):    diarrhea    headache    nausea  This list may not describe all possible side effects. Call your doctor for medical advice about  side effects. You may report side effects to FDA at 5-889-FDA-0849.  Where should I keep my medicine?  Keep out of the reach of children.  Store unopened pen in a refrigerator between 2 and 8 degrees C (36 and 46 degrees F). Do not freeze or use if the medicine has been frozen. Protect from light and excessive heat. After you first use the pen, it can be stored at room temperature between 15 and 30 degrees C (59 and 86 degrees F) or in a refrigerator. Throw away your used pen after 30 days or after the expiration date, whichever comes first.  Do not store your pen with the needle attached. If the needle is left on, medicine may leak from the pen.      Weight loss measures:   Caloric restriction, portion control and physical activity are evidence based strategies for life style changes. Here is a starting point in your case:       Reduced calorie meal plan; Goal is to lose 5-10% weight in the next six months.     Please keep a food journal of what you eat, calories in what you eat, and bring the journal with you to your next appointment.    Consider using applications for smart phones such as [employer], [employer], [employer], [employer], Tap&Track, and RelaxM. To keep food journal and track your exercise.     Focus on wet volumetrics, meaning, eat more foods that are high in water and fiber such as fruits and vegetables in order to get that full feeling. These are also good for your overall health as well.    Progressively increase physical activity to 45 minutes, including combination of cardio & resistance training x 5 days weekly. Start at 10-15 minutes per day and progressively work towards this goal.     Some insurance covers programs like weight watchers and I encourage you to contact your insurance.  Some individuals find programs like weight watchers helpful in their journey to a healthy life style.         I will contact the patient with the test results.  Return to clinic in 6 months/PRN; if patient  follows at the weight management clinic then appointment with us is not needed. Referral to weight management clinic placed.     Test and/or medications prescribed today:  Orders Placed This Encounter   Procedures     Hemoglobin A1c POCT         Lise Loco MD  Staff Endocrinologist    [phone]  Division of Endocrinology and Diabetes

## 2019-05-29 NOTE — NURSING NOTE
Chief Complaint   Patient presents with     RECHECK     Type 2 Diabetes     Capillary puncture performed for Hemoglobin A1C test. Patient tolerated well.    Rosalva Washington MA

## 2019-05-29 NOTE — TELEPHONE ENCOUNTER
[facility] phone call message- patient requesting results.    Test: Lab and X-ray    Date of test: 5/29/19    Additional Comments: Patient called (several times) today to check results of test that was one this am. Was told results were not in an once they were in, the doctor would need time to review and make notes, then she would be contacted. She is very anxious about the results.    Lab tab checked to verify if result comment present with in each order: Yes    Letters tab checked to verify if lab result letter has been entered: Yes    OK to leave a message on voice mail? Yes    Advised patient response may take up to 2 business days: Yes    Primary language: English      needed? No    Call taken on May 29, 2019 at 4:48 PM by Mica Cesar [facility]

## 2019-05-29 NOTE — PROGRESS NOTES
Endocrinology Clinic Visit    Chief Complaint: RECHECK (Type 2 Diabetes)     Information obtained from:Patient    Subjective:         HPI: Misty Girard is a 48 year old female with history of type 2 diabetes who is here for routine follow up.      History of substance abuse with methamphetamine for 20+ years currently [facility] since July 2018.     Diagnosed with diabetes about a year ago.  Started on metformin she is currently taking metformin 1000 international units twice daily.  Her A1c in September 2018 was 6.7 and have another reading in 11/2018 which was 6.5.  Hemoglobin is in the normal limits. Today her A1C was 7.7. Average blood glucose over the last 14 days has been 170's.     She does not have any side effects from metformin.  She reports that she was initially diagnosed with diabetes while she was in prison however she was reluctant to start medication until few months ago.    Blood sugar readings were downloaded.  She has been checking her blood sugars at least 2-3 times per day.  Her blood sugars are not consistently checked fasting but all the blood sugars we have are as follows over the last 1 week has been in the 160's and 170's.     Her main concern today is - anxious about the results of x-ray -chest she just had.     She has been having numbness and tingling of the left hand for which an x-ray of the cervical spine was obtained. The cervical spine showed possible changes in the apices of the lung which led to x-ray. The x-ray-chest report was not available.  Discussed to follow up with her providers.       No Known Allergies    Current Outpatient Medications   Medication Sig Dispense Refill     liraglutide (VICTOZA PEN) 18 MG/3ML solution Inject 0.6 mg daily and increase to 1.2 mg daily after four weeks. 9 mL 1     acetaminophen (TYLENOL) 500 MG tablet Take 1-2 tablets (500-1,000 mg) by mouth every 8 hours as needed for mild pain 100 tablet 3     albuterol (PROAIR  HFA/PROVENTIL HFA/VENTOLIN HFA) 108 (90 Base) MCG/ACT inhaler Inhale 2 puffs into the lungs every 4 hours as needed for shortness of breath / dyspnea or wheezing 2 Inhaler 3     aspirin (ASA) 81 MG tablet Take 1 tablet (81 mg) by mouth daily 100 tablet 3     atorvastatin (LIPITOR) 10 MG tablet Take 2 tablets (20 mg) by mouth At Bedtime 90 tablet 3     blood glucose (NO BRAND SPECIFIED) lancets standard Use to test blood sugar 1-2 times daily or as directed. 100 each 11     blood glucose (NO BRAND SPECIFIED) test strip Use to test blood sugars 2 times daily or as directed 100 strip 11     calcium polycarbophil (FIBERCON) 625 MG tablet Take 2 tablets (1,250 mg) by mouth daily 180 tablet 3     diclofenac (VOLTAREN) 1 % topical gel Place onto the skin 4 times daily 100 g 3     fluocinolone (SYNALAR) 0.01 % external cream Apply topically as needed (for eczema) 60 g 1     hydrocortisone (WESTCORT) 0.2 % external cream Apply topically 2 times daily 45 g 1     hydroxypropyl methylcellulose (GENTEAL) 0.2 % SOLN ophthalmic solution Place 1 drop into both eyes 4 times daily as needed for dry eyes 1 Bottle 5     hydrOXYzine (VISTARIL) 25 MG capsule Take 1-2 capsules (25-50 mg) by mouth 4 times daily as needed for anxiety 90 capsule 3     levonorgestrel (MIRENA) 20 MCG/24HR IUD 1 Device by Intrauterine route       lisinopril (PRINIVIL/ZESTRIL) 2.5 MG tablet Take 1 tablet (2.5 mg) by mouth daily 90 tablet 3     Menthol-Methyl Salicylate (OCTAVIO CONTI GREASELESS) cream Apply topically every 6 hours as needed (pain) 113 g 3     metFORMIN (GLUCOPHAGE) 500 MG tablet Take 2 tablets (1,000 mg) by mouth 2 times daily (with meals) 60 tablet 3     nystatin (MYCOSTATIN) ointment Apply topically 2 times daily       Omega-3 Fatty Acids (FISH OIL OMEGA-3) 1000 MG CAPS Take 1,000 mg by mouth daily as needed (prn) 90 capsule 3     order for DME Equipment being ordered: abdominal binder 1 Units 0     order for DME Equipment being ordered: Digital  home blood pressure monitor kit with cuff 1 Device 0     order for DME Equipment being ordered: Aquaphor cream jar 2 Container 20     order for DME Equipment being ordered: Full spectrum 10,000 lux light box (Procedure code )  Will use 30 min every morning and afternoon in fall and winter months. 1 Units 0     Prenatal Vit-Fe Fumarate-FA (PRENATAL MULTIVITAMIN W/IRON) 27-0.8 MG tablet Take 1 tablet by mouth daily 100 tablet 3     propranolol (INDERAL) 10 MG tablet Take 0.5-1 tablets (5-10 mg) by mouth 3 times daily as needed (anxiety) 90 tablet 3     spacer (OPTICHAMBER GENARO) holding chamber Holding/spacer device to use with inhaler. 1 each 0       Review of Systems     8 point review system (Constitutional, HENT, Eyes, Respiratory, Cardiovascular, Gastrointestinal, Genitourinary, Musculoskeletal,Neurological, Psychiatric/Behavioural, Endocrine) is negative or is as per HPI above except for numbness and tingling involving the hands.      Past Medical History:   Diagnosis Date     Elevated BP 1/27/2010    Following on/off propranalol     Fatigue 1/27/2010    Chronic fatigue syndrome?     Generalised anxiety disorder 1/27/2010    Propranalol as needed  Starting citalopram 1/10     Hypothyroidism 1/27/2010    50 mcg - 1/10     Secondary dysmenorrhea 1/27/2010       History reviewed. No pertinent surgical history.    Family History   Problem Relation Age of Onset     Diabetes Mother      C.A.D. Father      Hypertension Father        Social History     Social History     Marital status: Unknown     Spouse name: N/A     Number of children: N/A     Years of education: N/A     Social History Main Topics     Smoking status: Never Smoker     Smokeless tobacco: Never Used     Alcohol use No     Drug use: No     Sexual activity: No      Comment: none     Other Topics Concern     None     Social History Narrative    Incarcerated 5 yrs (2005-10) [facility] - Drug possession       Objective:   /77   Pulse 83   Ht  "1.664 m (5' 5.5\")   Wt 133.1 kg (293 lb 8 oz)   LMP  (LMP Unknown)   BMI 48.10 kg/m    Constitutional: Pleasant no acute cardiopulmonary distress.   EYES: anicteric, normal extra-ocular movements, no lid lag or retraction.  HEENT: Mouth/Throat: Mucous membrane is moist. Oropharynx is clear.   Cardiovascular: RRR, S1, S2 normal. No m/g/r   Pulmonary/Chest: CTAB. No wheezing or rales   Abdominal: +BS. Non tender to palpation. Stretch marks.   Neurological: Alert and oriented. No tremor.  Extremities: No edema.   Psychological: appropriate mood and affect     In House Labs:   Lab Results   Component Value Date    A1C 6.7 09/21/2018       TSH   Date Value Ref Range Status   09/21/2018 2.47 0.40 - 4.00 mU/L Final   01/27/2010 1.13 0.4 - 5.0 mU/L Final     T4 Free   Date Value Ref Range Status   01/27/2010 1.32 0.70 - 1.85 ng/dL Final       Creatinine   Date Value Ref Range Status   11/15/2018 0.7 0.5 - 1.0 mg/dL Final   ]      Assessment/Treatment Plan:      Type 2 diabetes with worsening hyperglycemia since I saw her three months ago/likely related to her weight gain:  A1c was 7.7 last week.   Continue current management with metformin. Add Victoza 0.6 mg daily and will be increased to 1.2 mg daily after four weeks.  Discussed side effects of liraglutide or Victoza including nausea and other GI side effects.  Patient does not have history of pancreatitis or personal or family history of medullary thyroid cancer.  Written information on the liraglutide including side effects provided to the patient.  Adding Victoza will be helpful also from obesity standpoint.  Chronic complications  No diabetic retinopathy per report   Blood pressure within the normal range  Mild peripheral neuropathy  Non-smoker  Currently on a statin  No microalbuminuria    Morbid obesity with a BMI of Body mass index is 48.1 kg/m .  Significant weight increase over the last 3 months  She is very much interested in working on losing weight.  She " has been trying lifestyle changes.  Additional information on lifestyle changes provided written instructions below.  Referral to provided at her last visit.  Weight management clinic will prescribe Saxenda at her last visit however was not covered by her insurance.  Down the line she wants also to consider bariatric surgery possibly sleeve gastrectomy and she reports that once she is discharged from the current treatment facility she would make an appointment with the weight management clinic.  In the meantime we have started her on Victoza as discussed under #1.       Patient Instructions   Start Liraglutide(Victoza) 0.6 mg daily  Increase the dose to 1.2 mg daily after four weeks.   Liraglutide    Liraglutide or victoza  What side effects may I notice from receiving this medicine?  Side effects that you should report to your doctor or health care professional as soon as possible:    allergic reactions like skin rash, itching or hives, swelling of the face, lips, or tongue    breathing problems    fever, chills    loss of appetite    signs and symptoms of low blood sugar such as feeling anxious, confusion, dizziness, increased hunger, unusually weak or tired, sweating, shakiness, cold, irritable, headache, blurred vision, fast heartbeat, loss of consciousness    trouble passing urine or change in the amount of urine    unusual stomach pain or upset    vomiting  Side effects that usually do not require medical attention (Report these to your doctor or health care professional if they continue or are bothersome.):    diarrhea    headache    nausea  This list may not describe all possible side effects. Call your doctor for medical advice about side effects. You may report side effects to FDA at 8-470-FDA-9994.  Where should I keep my medicine?  Keep out of the reach of children.  Store unopened pen in a refrigerator between 2 and 8 degrees C (36 and 46 degrees F). Do not freeze or use if the medicine has been frozen.  Protect from light and excessive heat. After you first use the pen, it can be stored at room temperature between 15 and 30 degrees C (59 and 86 degrees F) or in a refrigerator. Throw away your used pen after 30 days or after the expiration date, whichever comes first.  Do not store your pen with the needle attached. If the needle is left on, medicine may leak from the pen.      Weight loss measures:   Caloric restriction, portion control and physical activity are evidence based strategies for life style changes. Here is a starting point in your case:       Reduced calorie meal plan; Goal is to lose 5-10% weight in the next six months.     Please keep a food journal of what you eat, calories in what you eat, and bring the journal with you to your next appointment.    Consider using applications for smart phones such as EXO5, [employer], [employer], [employer]It, Tap&Track, and RelaxM. To keep food journal and track your exercise.     Focus on wet volumetrics, meaning, eat more foods that are high in water and fiber such as fruits and vegetables in order to get that full feeling. These are also good for your overall health as well.    Progressively increase physical activity to 45 minutes, including combination of cardio & resistance training x 5 days weekly. Start at 10-15 minutes per day and progressively work towards this goal.     Some insurance covers programs like weight watchers and I encourage you to contact your insurance.  Some individuals find programs like weight watchers helpful in their journey to a healthy life style.         I will contact the patient with the test results.  Return to clinic in 6 months/PRN; if patient follows at the weight management clinic then appointment with us is not needed. Referral to weight management clinic placed.     Test and/or medications prescribed today:  Orders Placed This Encounter   Procedures     Hemoglobin A1c POCT         Lise Loco MD  Staff  Endocrinologist    [phone]  Division of Endocrinology and Diabetes

## 2019-05-30 NOTE — TELEPHONE ENCOUNTER
Routing to provider to review as soon as possible and interpret so RN can contact her.  Nargis Pino RN

## 2019-06-01 PROBLEM — R91.8 ABNORMAL X-RAY OF LUNG: Status: ACTIVE | Noted: 2019-06-01

## 2019-06-03 NOTE — TELEPHONE ENCOUNTER
"Per Dr. Kumari, \"Attempted to call but phone number is at [facility].  Can she f/u at [facility]?     Chronic clavicle fracture, lungs are Ok   Seeing a density in thoracic spine- think the patient might be really anxious hearing this so f/u in clinic might be better solution.     Goldie \"  RN relayed this to [facility].  Nargis Pino RN   "

## 2019-06-03 NOTE — TELEPHONE ENCOUNTER
Claudia with [facility] returned call. Patient unable to attend appointment on Tuesday morning with Dr Kumari due to schedule at [facility]. Patient scheduled to see Dr Vera at 3:20pm on 6/7/19.

## 2019-06-10 ENCOUNTER — OFFICE VISIT (OUTPATIENT)
Dept: FAMILY MEDICINE | Facility: CLINIC | Age: 49
End: 2019-06-10
Payer: COMMERCIAL

## 2019-06-10 VITALS
SYSTOLIC BLOOD PRESSURE: 124 MMHG | HEART RATE: 100 BPM | RESPIRATION RATE: 16 BRPM | OXYGEN SATURATION: 96 % | WEIGHT: 284 LBS | BODY MASS INDEX: 46.54 KG/M2 | DIASTOLIC BLOOD PRESSURE: 88 MMHG

## 2019-06-10 DIAGNOSIS — E11.9 TYPE 2 DIABETES MELLITUS WITHOUT COMPLICATION, WITHOUT LONG-TERM CURRENT USE OF INSULIN (H): Primary | ICD-10-CM

## 2019-06-10 DIAGNOSIS — R93.89 ABNORMAL CHEST X-RAY: ICD-10-CM

## 2019-06-10 DIAGNOSIS — Z91.89 CANDIDATE FOR STATIN THERAPY DUE TO RISK OF FUTURE CARDIOVASCULAR EVENT: ICD-10-CM

## 2019-06-10 RX ORDER — ATORVASTATIN CALCIUM 10 MG/1
10 TABLET, FILM COATED ORAL AT BEDTIME
Qty: 90 TABLET | Refills: 3 | Status: SHIPPED | OUTPATIENT
Start: 2019-06-10 | End: 2020-06-10

## 2019-06-10 ASSESSMENT — ENCOUNTER SYMPTOMS
DYSURIA: 0
POLYPHAGIA: 0
CHILLS: 0
DIAPHORESIS: 0
FLANK PAIN: 0
DIFFICULTY URINATING: 0
FATIGUE: 0
UNEXPECTED WEIGHT CHANGE: 0
HEMATURIA: 0
APPETITE CHANGE: 0
FEVER: 0
ACTIVITY CHANGE: 0
NUMBNESS: 1
POLYDIPSIA: 0

## 2019-06-10 NOTE — PROGRESS NOTES
Preceptor Attestation:   Patient seen, evaluated and discussed with the resident. I have verified the content of the note, which accurately reflects my assessment of the patient and the plan of care.   Supervising Physician:  Ashley Lyman MD

## 2019-06-10 NOTE — PROGRESS NOTES
"       HPI       Misty Girard is a 48 year old who presents for follow-up for x-ray results and medication changes (victoza, atorvostatin). Misty has a PMH of uncontrolled DBII, addiction, generalized anxiety disorder, and obesity. Misty is currently living in a sober women's home, is in rehabilitation classes, and feel safe, secure, and motivated to improve her health. Misty has lost 9 lbs in the last month and is checking her blood glucose 4 times a day (average since new meter is 167). Her most recent A1C from 5/29/19 is up to 7.7 from her 6.5 in 11/28/18.    Misty is concerned that her atorvastatin (Lipitor) is too high and she's feeling sick on it. She requested that it be lowered to 10mg from her current 20 mg dose. She also states that she did not start her Victoza (liraglutide) because of fears around her blood glucose bottoming out and stated that she wished to discontinue it.     Chief Complaint   Patient presents with     discuss x -rays       Diabetes Follow-up    Patient is checking blood sugars: four times daily.    Blood sugar testing frequency justification: Uncontrolled diabetes  Results are as follows:         Average of 167 for the last few weeks since she has gotten new glucose monitor. She tests before breakfast, 2 hours after breakfast, before lunch, and before dinner.         -Last A1C was 7.7 (5/29/19).    Lab Results   Component Value Date    A1C 6.7 09/21/2018        Diabetic concerns: None    Chest Pain or exercise related calf pain (claudication):no     Symptoms of hypoglycemia (low blood sugar): none     Paresthesias (numbness or burning in feet) or sores: Yes. Has numb toes for a few months.      Diabetic eye exam within the last year?: Yes. \"a couple months ago\"      Adherence and Exercise  Medication side effects: no  How often is a medication missed? Never  Exercise: walking, elliptical, yoga/stretching and every day.     The 10-year ASCVD risk score (Kalpeshqamar ROTHMAN Jr., et al., 2013) is: " 3.1%    Values used to calculate the score:      Age: 48 years      Sex: Female      Is Non- : No      Diabetic: Yes      Tobacco smoker: No      Systolic Blood Pressure: 124 mmHg      Is BP treated: Yes      HDL Cholesterol: 35.9 mg/dL      Total Cholesterol: 145.8 mg/dL    ASCVD 10 year RISK:  Calculated 06/10/19   The 10-year ASCVD risk score (Kalpesh ROTHMAN Jr., et al., 2013) is: 3.1%    Values used to calculate the score:      Age: 48 years      Sex: Female      Is Non- : No      Diabetic: Yes      Tobacco smoker: No      Systolic Blood Pressure: 124 mmHg      Is BP treated: Yes      HDL Cholesterol: 35.9 mg/dL      Total Cholesterol: 145.8 mg/dL     Therapy Plan:Moderate Intensity  atorvastatin 10 mg in addition to her current metformin.     48 year old  White, Ethnic Group.American,   History   Smoking Status     Never Smoker   Smokeless Tobacco     Never Used     BP Readings from Last 3 Encounters:   06/10/19 124/88   05/29/19 112/77   05/28/19 124/83     Lab Results   Component Value Date    CHOL 145.8 03/22/2019     Lab Results   Component Value Date    HDL 35.9 03/22/2019     Lab Results   Component Value Date    LDL 54 03/22/2019     Lab Results   Component Value Date    TRIG 279.9 03/22/2019     Lab Results   Component Value Date    CHOLHDLRATIO 4.1 03/22/2019     Current Outpatient Medications   Medication     acetaminophen (TYLENOL) 500 MG tablet     albuterol (PROAIR HFA/PROVENTIL HFA/VENTOLIN HFA) 108 (90 Base) MCG/ACT inhaler     aspirin (ASA) 81 MG tablet     atorvastatin (LIPITOR) 10 MG tablet     blood glucose (NO BRAND SPECIFIED) lancets standard     blood glucose (NO BRAND SPECIFIED) test strip     calcium polycarbophil (FIBERCON) 625 MG tablet     fluocinolone (SYNALAR) 0.01 % external cream     hydrocortisone (WESTCORT) 0.2 % external cream     hydroxypropyl methylcellulose (GENTEAL) 0.2 % SOLN ophthalmic solution     hydrOXYzine (VISTARIL) 25 MG  capsule     levonorgestrel (MIRENA) 20 MCG/24HR IUD     lisinopril (PRINIVIL/ZESTRIL) 2.5 MG tablet     Menthol-Methyl Salicylate (OCTAVIO CONTI GREASELESS) cream     metFORMIN (GLUCOPHAGE) 500 MG tablet     nystatin (MYCOSTATIN) ointment     Omega-3 Fatty Acids (FISH OIL OMEGA-3) 1000 MG CAPS     order for DME     order for DME     order for DME     order for DME     Prenatal Vit-Fe Fumarate-FA (PRENATAL MULTIVITAMIN W/IRON) 27-0.8 MG tablet     propranolol (INDERAL) 10 MG tablet     Semaglutide (OZEMPIC) 0.25 or 0.5 MG/DOSE SOPN     spacer (OPTICHAMBER GENARO) holding chamber     diclofenac (VOLTAREN) 1 % topical gel     liraglutide (VICTOZA PEN) 18 MG/3ML solution     No current facility-administered medications for this visit.      Problem, Medication and Allergy Lists were      Patient Active Problem List    Diagnosis Date Noted     Abnormal x-ray of lung 06/01/2019     Priority: Medium     Eczema, unspecified type 05/28/2019     Priority: Medium     Depression 02/26/2019     Priority: Medium     IUD (intrauterine device) in place 01/12/2019     Priority: Medium     2018: Pt is postmenopausal based on labs. Plan to keep IUD in place for another 1-2 years for endometrial suppression (protect against dysplasia)       Methamphetamine use (H) 09/21/2018     Priority: Medium     In treatment  9/21/18: sober 190 days       ACP (advance care planning) 07/16/2018     Priority: Medium     Diabetic peripheral neuropathy (H) 07/16/2018     Priority: Medium     Overview:   7/16/18  reports toes are tingling, both feet and also the soles of her feed       History of tobacco abuse 07/16/2018     Priority: Medium     Overview:   started age 12,  max   , quit 11/2017       Morbid obesity with BMI of 40.0-44.9, adult (H) 07/16/2018     Priority: Medium     Overview:   max weight about 298.  5'5 to 5'6       Osteoarthritis of left knee 07/06/2017     Priority: Medium     Vitamin D deficiency 10/19/2016     Priority: Medium      Controlled type 2 diabetes mellitus without complication, without long-term current use of insulin (H) 09/29/2016     Priority: Medium     Overview:   diagnosed when seen by provider for 1st time in a while  low doses of glipizide -- caused low blood sugars 7/17/2018        Borderline personality disorder (H) 09/27/2016     Priority: Medium     Family history of coronary artery disease 09/27/2016     Priority: Medium     Overview:   Dad MI age 45       Hypertension, essential, benign 09/27/2016     Priority: Medium     Overview:   also on propanolol for anxiety.       PTSD (post-traumatic stress disorder) 09/27/2016     Priority: Medium     CARDIOVASCULAR SCREENING; LDL GOAL LESS THAN 160 10/31/2010     Priority: Medium     Generalized anxiety disorder 01/27/2010     Priority: Medium     Overview:   Overview:   Propranalol as needed   Starting citalopram 1/10  Diagnosis updated by automated process. Provider to review and confirm.  Propranalol as needed   Starting citalopram 1/10  Diagnosis updated by automated process. Provider to review and confirm.         Secondary dysmenorrhea 01/27/2010     Priority: Medium     Overview:   due to IUD inplace       Fatigue 01/27/2010     Priority: Medium     Chronic fatigue syndrome?       Hypothyroidism 01/27/2010     Priority: Medium     50 mcg - 1/10       Hypertriglyceridemia 03/01/2009     Priority: Medium     Panic disorder without agoraphobia 01/19/2001     Priority: Medium   ,     Current Outpatient Medications   Medication Sig Dispense Refill     acetaminophen (TYLENOL) 500 MG tablet Take 1-2 tablets (500-1,000 mg) by mouth every 8 hours as needed for mild pain 100 tablet 3     albuterol (PROAIR HFA/PROVENTIL HFA/VENTOLIN HFA) 108 (90 Base) MCG/ACT inhaler Inhale 2 puffs into the lungs every 4 hours as needed for shortness of breath / dyspnea or wheezing 2 Inhaler 3     aspirin (ASA) 81 MG tablet Take 1 tablet (81 mg) by mouth daily 100 tablet 3     atorvastatin  (LIPITOR) 10 MG tablet Take 1 tablet (10 mg) by mouth At Bedtime 90 tablet 3     blood glucose (NO BRAND SPECIFIED) lancets standard Use to test blood sugar 1-2 times daily or as directed. 100 each 11     blood glucose (NO BRAND SPECIFIED) test strip Use to test blood sugars 2 times daily or as directed 100 strip 11     calcium polycarbophil (FIBERCON) 625 MG tablet Take 2 tablets (1,250 mg) by mouth daily 180 tablet 3     fluocinolone (SYNALAR) 0.01 % external cream Apply topically as needed (for eczema) 60 g 1     hydrocortisone (WESTCORT) 0.2 % external cream Apply topically 2 times daily 45 g 1     hydroxypropyl methylcellulose (GENTEAL) 0.2 % SOLN ophthalmic solution Place 1 drop into both eyes 4 times daily as needed for dry eyes 1 Bottle 5     hydrOXYzine (VISTARIL) 25 MG capsule Take 1-2 capsules (25-50 mg) by mouth 4 times daily as needed for anxiety 90 capsule 3     levonorgestrel (MIRENA) 20 MCG/24HR IUD 1 Device by Intrauterine route       lisinopril (PRINIVIL/ZESTRIL) 2.5 MG tablet Take 1 tablet (2.5 mg) by mouth daily 90 tablet 3     Menthol-Methyl Salicylate (OCTAVIO CONTI GREASELESS) cream Apply topically every 6 hours as needed (pain) 113 g 3     metFORMIN (GLUCOPHAGE) 500 MG tablet Take 2 tablets (1,000 mg) by mouth 2 times daily (with meals) 60 tablet 3     nystatin (MYCOSTATIN) ointment Apply topically 2 times daily       Omega-3 Fatty Acids (FISH OIL OMEGA-3) 1000 MG CAPS Take 1,000 mg by mouth daily as needed (prn) 90 capsule 3     order for DME Equipment being ordered: abdominal binder 1 Units 0     order for DME Equipment being ordered: Digital home blood pressure monitor kit with cuff 1 Device 0     order for DME Equipment being ordered: Aquaphor cream jar 2 Container 20     order for DME Equipment being ordered: Full spectrum 10,000 lux light box (Procedure code )  Will use 30 min every morning and afternoon in fall and winter months. 1 Units 0     Prenatal Vit-Fe Fumarate-FA (PRENATAL  MULTIVITAMIN W/IRON) 27-0.8 MG tablet Take 1 tablet by mouth daily 100 tablet 3     propranolol (INDERAL) 10 MG tablet Take 0.5-1 tablets (5-10 mg) by mouth 3 times daily as needed (anxiety) 90 tablet 3     Semaglutide (OZEMPIC) 0.25 or 0.5 MG/DOSE SOPN Inject 0.25 mg Subcutaneous once a week 3 mL 3     spacer ([place] GENARO) holding chamber Holding/spacer device to use with inhaler. 1 each 0     diclofenac (VOLTAREN) 1 % topical gel Place onto the skin 4 times daily (Patient not taking: Reported on 6/10/2019) 100 g 3     liraglutide (VICTOZA PEN) 18 MG/3ML solution Inject 0.6 mg daily and increase to 1.2 mg daily after four weeks. (Patient not taking: Reported on 6/10/2019) 9 mL 1   ,   No Known Allergies.    Patient is an established patient of this clinic..         Review of Systems:   Review of Systems   Constitutional: Negative for activity change, appetite change, chills, diaphoresis, fatigue, fever and unexpected weight change.   Endocrine: Negative for cold intolerance, heat intolerance, polydipsia, polyphagia and polyuria.   Genitourinary: Negative for decreased urine volume, difficulty urinating, dysuria, flank pain, hematuria and urgency.   Neurological: Positive for numbness.          Physical Exam:     Vitals:    06/10/19 1328   BP: 124/88   Pulse: 100   Resp: 16   SpO2: 96%   Weight: 128.8 kg (284 lb)     Body mass index is 46.54 kg/m .  Vitals were reviewed and were normal     Physical Exam   Constitutional: She appears well-developed and well-nourished. No distress.   Eyes: Pupils are equal, round, and reactive to light. Conjunctivae are normal. Right eye exhibits no discharge. Left eye exhibits no discharge.   Cardiovascular: Normal rate, regular rhythm and normal heart sounds. Exam reveals no gallop and no friction rub.   No murmur heard.  Pulmonary/Chest: Effort normal and breath sounds normal.   Neurological: She is alert.   Skin: She is not diaphoretic.   Vitals reviewed.      Results:    No testing ordered today    Assessment and Plan        1. Type 2 diabetes mellitus without complication, without long-term current use of insulin (H)  2. Candidate for statin therapy due to risk of future cardiovascular event   Misty's DB Type 2 is uncontrolled with a rising A1C and an average blood glucose of 167 with her most recent meter. We discussed the concerns about the need for better control of her blood glucose, her concerns about low blood sugars with more medications, and how additional medications to her metformin would benefit her. We lowered her Lipitor to the lower dose she requested which is still within the moderate intensity statin range required for her DB status and changed her Victoza to Ozempic, which is easier to administer and explain how this would not bottom out her blood glucose in the same way that insulin would.    - atorvastatin (LIPITOR) 10 MG tablet; Take 1 tablet (10 mg) by mouth At Bedtime  Dispense: 90 tablet; Refill: 3   - Semaglutide (OZEMPIC) 0.25 or 0.5 MG/DOSE SOPN; Inject 0.25 mg Subcutaneous once a week  Dispense: 3 mL; Refill: 3    2. Abnormal chest x-ray   We reviewed the results from her x-ray and put in a referral for her to follow up with a thoracic CT w/o contrast for the calcium nodules noted in the impressions.    - CT Thoracic Spine w/o Contrast; Future       Medications Discontinued During This Encounter   Medication Reason     atorvastatin (LIPITOR) 10 MG tablet Reorder       Options for treatment and follow-up care were reviewed with the patient. Misty Girard  engaged in the decision making process and verbalized understanding of the options discussed and agreed with the final plan.    Juanita Porter, MS3    Resident/Fellow Attestation   I, Radha Block, was present with the medical student who participated in the service and in the documentation of the note.  I have verified the history and personally performed the physical exam and medical decision making.  I  agree with the assessment and plan of care as documented in the note.      Radha Block, DO  PGY2

## 2019-06-10 NOTE — PATIENT INSTRUCTIONS
Here is the plan from today's visit    Decreased lipitor dose to 1 tablet daily  Stop Victoza  Start Ozempic    1. Type 2 diabetes mellitus without complication, without long-term current use of insulin (H)  - atorvastatin (LIPITOR) 10 MG tablet; Take 1 tablet (10 mg) by mouth At Bedtime  Dispense: 90 tablet; Refill: 3  - Semaglutide (OZEMPIC) 0.25 or 0.5 MG/DOSE SOPN; Inject 0.25 mg Subcutaneous once a week  Dispense: 3 mL; Refill: 3    2. Calcific nodule on xray  - Call number below to schedule CT of thoracic spine    Please call or return to clinic if your symptoms don't go away.    Follow up plan  Please make a clinic appointment for follow up with your primary physician Agueda Vera, DO in 2 weeks to check in on symptoms.    Thank you for coming to [facility] today.  Lab Testing:  **If you had lab testing today and your results are reassuring or normal they will be mailed to you or sent through [employer] within 7 days.   **If the lab tests need quick action we will call you with the results.  The phone number we will call with results is # [phone] (home) . If this is not the best number please call our clinic and change the number.  Medication Refills:  If you need any refills please call your pharmacy and they will contact us.   If you need to  your refill at a new pharmacy, please contact the new pharmacy directly. The new pharmacy will help you get your medications transferred faster.   Scheduling:  If you have any concerns about today's visit or wish to schedule another appointment please call our office during normal business hours [phone] (8-5:00 M-F)  If a referral was made to a [facility] and you don't get a call from central scheduling please call [phone].  If a Mammogram was ordered for you at [facility] call [phone] to schedule or change your appointment.  If you had an XRay/CT/Ultrasound/MRI ordered the number is [phone] to  schedule or change your radiology appointment.   Medical Concerns:  If you have urgent medical concerns please call [phone] at any time of the day.    Radha Block, DO

## 2019-06-11 ENCOUNTER — TELEPHONE (OUTPATIENT)
Dept: FAMILY MEDICINE | Facility: CLINIC | Age: 49
End: 2019-06-11

## 2019-06-11 NOTE — TELEPHONE ENCOUNTER
Prior Authorization Retail Medication Request    Medication/Dose: Semaglutide (OZEMPIC) 0.25 or 0.5 MG/DOSE SOPN  ICD code (if different than what is on RX):  Type 2 diabetes mellitus without complication, without long-term current use of insulin (H) [E11.9]  - Primary     Previously Tried and Failed:  See chart  Rationale:  See chart    Insurance Name:  [facility]  Insurance ID:  [id number]      Pharmacy Information (if different than what is on RX)  [facility]  [facility]  Phone:  [phone]

## 2019-06-12 NOTE — TELEPHONE ENCOUNTER
Form For more information needed for prior authorization was placed in Dr Vera's Folder.  Bri Sanchez, CMA

## 2019-06-12 NOTE — TELEPHONE ENCOUNTER
Central Prior Authorization Team   Phone: [phone]      PA Initiation    Medication: Semaglutide (OZEMPIC) 0.25 or 0.5 MG/DOSE SOPN-PA Pending  Insurance Company: [employer] - Phone [phone] Fax [phone]  Pharmacy Filling the Rx: [facility] [id number] - [place], [place] - [address] [place] [address], [id number]  Filling Pharmacy Phone: [phone]  Filling Pharmacy Fax: [phone]  Start Date: 6/12/2019

## 2019-06-13 ENCOUNTER — DOCUMENTATION ONLY (OUTPATIENT)
Dept: FAMILY MEDICINE | Facility: CLINIC | Age: 49
End: 2019-06-13

## 2019-06-13 ENCOUNTER — ANCILLARY PROCEDURE (OUTPATIENT)
Dept: CT IMAGING | Facility: CLINIC | Age: 49
End: 2019-06-13
Attending: FAMILY MEDICINE
Payer: COMMERCIAL

## 2019-06-13 ENCOUNTER — TELEPHONE (OUTPATIENT)
Dept: ENDOCRINOLOGY | Facility: CLINIC | Age: 49
End: 2019-06-13

## 2019-06-13 DIAGNOSIS — R93.89 ABNORMAL CHEST X-RAY: ICD-10-CM

## 2019-06-13 NOTE — TELEPHONE ENCOUNTER
"Pt came to clinic today asking Should she be taking the ozempic weekly? S states she \"lost it\" at [facility], didn't want to feel the side effects , \"started freakin out\" , she went to [facility] saw Dr Block, who stated she should ozempic once a week, because the side effects will be less, Pt concerned about low BS results, Dr Block stated she \"had to take something or I will die\", however, she has been scared, has lost 9.8 lbs in the past 10 days, BS on average have been 130-160 verses 250-260 previously. The victoza has been discontinued.Wants to get off all injection medications completely. Should she be taking the ozempic weekly? She wants to be successful. Because she has seen people suffer terrible effects of injections while she was in retirement. Please call . Claudia Velez  for [place]. Also please use [employer] so she has access to the information after she leaves. The general number for  [facility].  Genoveva Ramírez RN on 6/13/2019 at 4:33 PM    "

## 2019-06-13 NOTE — TELEPHONE ENCOUNTER
PRIOR AUTHORIZATION DENIED    Medication: Semaglutide (OZEMPIC) 0.25 or 0.5 MG/DOSE SOPN-DENIED    Denial Date: 6/12/2019    Denial Rational:           Appeal Information:

## 2019-06-13 NOTE — PROGRESS NOTES
"When opening a documentation only encounter, be sure to enter in \"Chief Complaint\" Forms and in \" Comments\" Title of form, description if needed.    Misty is a 48 year old  female  Form received via: Fax  Form now resides in: Provider CHENCHO Ott MA                  "

## 2019-06-13 NOTE — TELEPHONE ENCOUNTER
Prior Authorization:     Denied Reason: see chart    Alternatives: None available    Pharmacy notified.  Routing to MD    Please advise if you would like to move forward with the appeal process or plan to  prescribe an alternative medication. If Appeal is desired a letter of medical necessity with denial rationale is needed to start the appeal process.   Route to PA Pool when completed.    ARLET Burnett 1:00 PM June 13, 2019

## 2019-06-14 ENCOUNTER — TELEPHONE (OUTPATIENT)
Dept: FAMILY MEDICINE | Facility: CLINIC | Age: 49
End: 2019-06-14

## 2019-06-14 NOTE — TELEPHONE ENCOUNTER
[facility] phone call message- patient requesting results.    Test: Lab and CT    Date of test: 6/13/19    Additional Comments: Patient called in for results. A letter had been mailed, so I read her the letter. She didn't have any questions.    Lab tab checked to verify if result comment present with in each order: Yes    Letters tab checked to verify if lab result letter has been entered: Yes    OK to leave a message on voice mail? No. No call back needed.    Advised patient response may take up to 2 business days: No    Primary language: English      needed? No    Call taken on June 14, 2019 at 12:47 PM by Mica Contreras    Route to [facility] TRIAGE

## 2019-06-15 NOTE — TELEPHONE ENCOUNTER
Will not proceed with appeal or alternative GLP-1 at this time. Patient has been extremely anxious about injectable medications per chart review of calls to [facility] and endocrine - this clearly isn't a good fit. Will have patient make appt for DPP-4 PO rx.     Triage RN - Please call patient to come in for appointment to prescribe oral alternative DM med that won't cause low blood sugars (we'll probably go with sitagliptin). In person appointment would probably be best as patient is extremely anxious, and this would allow best explanation and question answering. Per note from endocrine clinic, patient believes she will die without  medicine, so if she asks, please reassure her that I say we have plenty of time to safely optimize her meds.   David Vera, DO

## 2019-06-17 ENCOUNTER — OFFICE VISIT (OUTPATIENT)
Dept: FAMILY MEDICINE | Facility: CLINIC | Age: 49
End: 2019-06-17
Payer: COMMERCIAL

## 2019-06-17 VITALS
TEMPERATURE: 99.1 F | WEIGHT: 287.8 LBS | OXYGEN SATURATION: 96 % | BODY MASS INDEX: 47.16 KG/M2 | SYSTOLIC BLOOD PRESSURE: 118 MMHG | HEART RATE: 91 BPM | DIASTOLIC BLOOD PRESSURE: 80 MMHG

## 2019-06-17 DIAGNOSIS — N30.01 ACUTE CYSTITIS WITH HEMATURIA: ICD-10-CM

## 2019-06-17 DIAGNOSIS — R82.90 ABNORMAL URINE ODOR: Primary | ICD-10-CM

## 2019-06-17 LAB
BACTERIA: NORMAL
BILIRUBIN UR: NEGATIVE
BLOOD UR: ABNORMAL
CASTS: NORMAL /LPF
CRYSTAL URINE: NORMAL /LPF
EPITHELIAL CELLS UR: NORMAL /LPF (ref 0–2)
GLUCOSE URINE: NEGATIVE
KETONES UR QL: NEGATIVE
LEUKOCYTE ESTERASE UR: ABNORMAL
MUCOUS URINE: NORMAL LPF
NITRITE UR QL STRIP: NEGATIVE
PH UR STRIP: 6 [PH] (ref 4.5–8)
PROTEIN UR: NEGATIVE
RBC URINE: NORMAL /HPF
SP GR UR STRIP: 1.01 (ref 1–1.03)
UROBILINOGEN UR STRIP-ACNC: ABNORMAL
WBC URINE: NORMAL /HPF

## 2019-06-17 RX ORDER — CIPROFLOXACIN 250 MG/1
250 TABLET, FILM COATED ORAL 2 TIMES DAILY
Qty: 14 TABLET | Refills: 0 | Status: SHIPPED | OUTPATIENT
Start: 2019-06-17 | End: 2019-12-06

## 2019-06-17 NOTE — TELEPHONE ENCOUNTER
Called patient's facility  and spoke with Claudia to have patient scheduled for appointment today to see provider at 1:00 o'clock.    Patient's  verbalized understanding and confirmed time and date.    Message routed to PCP as Fyi and  call center to schedule appointment.    Unique Storm RN

## 2019-06-17 NOTE — TELEPHONE ENCOUNTER
Dr Loco,   The message has that she stopped Victoza and  Ozempic was prescribed for less side effects. I am not sure who prescribed it ( deoln) but she  Is asking  If she should take Ozempic once weekly  Due to weight loss which is a side effect of Ozempic . Mica North RN on 6/17/2019 at 1:44 PM  .

## 2019-06-17 NOTE — TELEPHONE ENCOUNTER
I sent a  My chart message to patient that Ozempic is ok but not prescribed by Dr Loco. She should not take both Victoza and Ozempic  . F/u requested in the weight loss clinic .

## 2019-06-17 NOTE — TELEPHONE ENCOUNTER
This writer tried both numbers provided below but got a wrong number message.     OK to stop Victoza based on patient's concerns documented below.     Please advise patient to schedule an appointment at the weight management clinic. Referral placed at the initial visit in 11/2018.

## 2019-06-18 NOTE — PROGRESS NOTES
SUBJECTIVE:  The patient is here, she said because she was asked to come in.  She is a [facility] [employer] [facility] patient.  She has diabetes which has had worsening control with an A1c that went to 7.7.  She was prescribed semaglutide at her most recent visit on the 10th to try to improve her control, but she did not start that because she really does not tolerate shots.  This has to do with her history of drug use and so there was a message in the snapshot from her PCP with the recommendation that she be started on Januvia instead.  So I think she was passed along the message that she should come in to get started on Januvia.  She is extremely reluctant to go on anything else for her diabetes.  She is extremely motivated to try diet and exercise.  She says she has lost 9 pounds within a short period of time and that her sugars have improved greatly.  Her average for the last 30 days was 160 and her highest level was 175.  She has a lot of fear of being hypoglycemic because of an episode that she had where she became hypoglycemic in skilled nursing, where there was nobody around to help her.  So she has been extremely reluctant to go on anything else for improving her diabetes control and very motivated to do diet and exercise.  She also mentioned that she has been having increased smell of her urine.  No dysuria, decreased frequency.  She does have a history of UTIs.      OBJECTIVE:  On exam, the patient is in no acute distress.  Vital signs are stable.  UA showed 2+ leukocyte esterase.  No further exam was done.      IMPRESSION:  Diabetes, with last A1c of 7.7, presumed UTI.      PLAN:  The patient really did not want to start Januvia, but wanted to continue to try diet and exercise, with followup in the next 2 weeks, bringing her glucometer to show what she has been able to do, which I thought was reasonable.  She said she had had some bad reaction to some antibiotic, which she could not remember the name of, for a UTI,  but she was quite sure she had no problems with Cipro before, so I did go ahead and prescribe that for her UTI, 50 mg b.i.d. for 7 days.      Visit length was 25 minutes, all spent counseling about her diabetes control and UTI symptoms.      Celia Martinez MD

## 2019-07-01 ENCOUNTER — OFFICE VISIT (OUTPATIENT)
Dept: FAMILY MEDICINE | Facility: CLINIC | Age: 49
End: 2019-07-01
Payer: COMMERCIAL

## 2019-07-01 VITALS
SYSTOLIC BLOOD PRESSURE: 120 MMHG | OXYGEN SATURATION: 95 % | DIASTOLIC BLOOD PRESSURE: 84 MMHG | WEIGHT: 289 LBS | HEART RATE: 87 BPM | RESPIRATION RATE: 16 BRPM | HEIGHT: 65 IN | TEMPERATURE: 98.5 F | BODY MASS INDEX: 48.15 KG/M2

## 2019-07-01 DIAGNOSIS — E55.9 VITAMIN D DEFICIENCY: ICD-10-CM

## 2019-07-01 DIAGNOSIS — R00.2 PALPITATIONS: ICD-10-CM

## 2019-07-01 DIAGNOSIS — Z12.11 COLON CANCER SCREENING: ICD-10-CM

## 2019-07-01 DIAGNOSIS — Z00.00 HEALTHCARE MAINTENANCE: ICD-10-CM

## 2019-07-01 DIAGNOSIS — Z12.31 VISIT FOR SCREENING MAMMOGRAM: ICD-10-CM

## 2019-07-01 DIAGNOSIS — E11.9 TYPE 2 DIABETES MELLITUS WITHOUT COMPLICATION, WITHOUT LONG-TERM CURRENT USE OF INSULIN (H): Primary | ICD-10-CM

## 2019-07-01 LAB — VIT B12 SERPL-MCNC: 448 PG/ML (ref 193–986)

## 2019-07-01 RX ORDER — CHOLECALCIFEROL (VITAMIN D3) 50 MCG
2000 TABLET ORAL DAILY
Qty: 90 TABLET | Refills: 1 | Status: SHIPPED | OUTPATIENT
Start: 2019-07-01 | End: 2020-03-04

## 2019-07-01 ASSESSMENT — MIFFLIN-ST. JEOR: SCORE: 1937.39

## 2019-07-01 NOTE — PROGRESS NOTES
"       HPI       Misty Girard is a 49 year old  who presents for   Chief Complaint   Patient presents with     Follow Up     diabetes check up     Forms     [facility]     Patient is in [employer] [facility] and about finished with her time.    Diabetes Follow-up    Patient is checking blood sugars: four times daily. In  if she doesn't eat a snack a couple of hours before she goes to bed; 150 if she does eat a snack, averages 150-155  Blood sugar testing frequency justification: Uncontrolled diabetes, Adjustment of medication(s) and Patient modifying lifestyle changes (diet, exercise) with blood sugars           -Last A1C was   Lab Results   Component Value Date    A1C 6.7 09/21/2018        Diabetic concerns: None    Chest Pain or exercise related calf pain (claudication):no     Symptoms of hypoglycemia (low blood sugar): dizzy, weak     Paresthesias (numbness or burning in feet) or sores: Yes, still the same     Diabetic eye exam within the last year?: Yes, 4 months ago      Adherence and Exercise  Medication side effects: no  How often is a medication missed? Never, there medications are administered and obseved in [facility]  Exercise: elliptical on weekend, walk twice a week (2 blocks once a day)    Palpitations: A slight flutter of her heart beat and slight chest pain, while she was sitting down and she felt clamy for a second and then it resolved within 5 seconds. This has happened before a few months ago. Has taken propranolol before tacycardia, but hasn't been on it.    Problem, Medication and Allergy Lists were reviewed and updated if needed..    Patient is an established patient of this clinic..         Review of Systems:   Review of Systems  See HPI       Physical Exam:     Vitals:    07/01/19 1412   BP: 120/84   Pulse: 87   Resp: 16   Temp: 98.5  F (36.9  C)   TempSrc: Oral   SpO2: 95%   Weight: 131.1 kg (289 lb)   Height: 1.66 m (5' 5.35\")     Body mass index is 47.57 kg/m .  Vitals " were reviewed and were normal except for elevated BMI     Physical Exam   Constitutional: She is oriented to person, place, and time.   HENT:   Head: Normocephalic and atraumatic.   Cardiovascular: Normal rate, regular rhythm, normal heart sounds and intact distal pulses. Exam reveals no gallop and no friction rub.   No murmur heard.  Pulmonary/Chest: Effort normal and breath sounds normal. No stridor. No respiratory distress. She has no wheezes. She has no rales.   Neurological: She is alert and oriented to person, place, and time.   Skin: Skin is warm and dry.   Psychiatric: Her mood appears anxious. Her speech is delayed (with some word finding difficulties).   Rocking on bench during visit         Results:   Labs ordered and pending    Assessment and Plan        Misty was seen today for follow up and forms.    Diagnoses and all orders for this visit:    Type 2 diabetes mellitus without complication, without long-term current use of insulin (H) - A1 on 5/29/19 was 7.7, up from her last reading 11/28/19 of 6.5. Is very resistant to changing medications due to her polysubstance abuse history. Is about to finished her time at [employer] and be on her own. Is making strides with diet and exercise since her last visit and would like to keep trying that until August when next A1c is due. Refill given on metformin.  -     metFORMIN (GLUCOPHAGE) 500 MG tablet; Take 2 tablets (1,000 mg) by mouth 2 times daily (with meals)  -     Vitamin B12    Palpitations - Patient has had tachycardia in the past and has been on propranolol in the past. Not taking propranolol anymore. Feeling a flutter with a twinge of chest pain. Most likely anxiety related. Patient VERY anxious and rocking on bench during entire appointment. Had a stress ECHO in 2010 that was normal. If continues or last for more than a few seconds, recommend coming in and will get ECG and consider Holter monitor. If chest pain severe, go to ED. Patient agreed  with plan.    Vitamin D deficiency - Refill requested.  -     vitamin D3 (CHOLECALCIFEROL) 2000 units (50 mcg) tablet; Take 1 tablet (2,000 Units) by mouth daily    Healthcare maintenance - Due for tdap. Accepted vaccine.  -     ADMIN VACCINE, INITIAL  -     TDAP VACCINE (BOOSTRIX)    Visit for screening mammogram  Colon cancer screening  -     Screening Mammogram Digital Bilateral; Future  -     Fecal colorectal cancer screen FITT; Future           Medications Discontinued During This Encounter   Medication Reason     metFORMIN (GLUCOPHAGE) 500 MG tablet Reorder       Options for treatment and follow-up care were reviewed with the patient. Misty Girard  engaged in the decision making process and verbalized understanding of the options discussed and agreed with the final plan.    Natacha Read MD  PGY-3

## 2019-07-01 NOTE — PATIENT INSTRUCTIONS
Here is the plan from today's visit    1. Type 2 diabetes mellitus without complication, without long-term current use of insulin (H)  - metFORMIN (GLUCOPHAGE) 500 MG tablet; Take 2 tablets (1,000 mg) by mouth 2 times daily (with meals)  Dispense: 90 tablet; Refill: 3  - Vitamin B12    2. Vitamin D deficiency  - vitamin D3 (CHOLECALCIFEROL) 2000 units (50 mcg) tablet; Take 1 tablet (2,000 Units) by mouth daily  Dispense: 90 tablet; Refill: 1    3. Visit for screening mammogram  They will call to set up mammogram  - Screening Mammogram Digital Bilateral; Future      Please call or return to clinic if your symptoms don't go away.    Follow up plan  Please make a clinic appointment for follow up with your primary physician Agueda Vera, DO in 3 months for follow up.    Thank you for coming to [facility] today.  Lab Testing:  **If you had lab testing today and your results are reassuring or normal they will be mailed to you or sent through [facility] within 7 days.   **If the lab tests need quick action we will call you with the results.  The phone number we will call with results is # [phone] (home) . If this is not the best number please call our clinic and change the number.  Medication Refills:  If you need any refills please call your pharmacy and they will contact us.   If you need to  your refill at a new pharmacy, please contact the new pharmacy directly. The new pharmacy will help you get your medications transferred faster.   Scheduling:  If you have any concerns about today's visit or wish to schedule another appointment please call our office during normal business hours [phone] (8-5:00 M-F)  If a referral was made to a [facility] and you don't get a call from central scheduling please call [phone].  If a Mammogram was ordered for you at [facility] call [phone] to schedule or change your appointment.  If you had an XRay/CT/Ultrasound/MRI  ordered the number is [phone] to schedule or change your radiology appointment.   Medical Concerns:  If you have urgent medical concerns please call [phone] at any time of the day.    Natacha Read MD

## 2019-07-02 ASSESSMENT — ASTHMA QUESTIONNAIRES: ACT_TOTALSCORE: 22

## 2019-07-07 ENCOUNTER — HOSPITAL ENCOUNTER (OUTPATIENT)
Facility: CLINIC | Age: 49
Setting detail: SPECIMEN
Discharge: HOME | End: 2019-07-07
Admitting: FAMILY MEDICINE
Payer: COMMERCIAL

## 2019-07-07 PROCEDURE — 82274 ASSAY TEST FOR BLOOD FECAL: CPT | Performed by: FAMILY MEDICINE

## 2019-07-09 LAB — HEMOCCULT STL QL IA: NEGATIVE

## 2019-07-22 ENCOUNTER — ANCILLARY PROCEDURE (OUTPATIENT)
Dept: MAMMOGRAPHY | Facility: CLINIC | Age: 49
End: 2019-07-22
Attending: FAMILY MEDICINE
Payer: COMMERCIAL

## 2019-07-22 DIAGNOSIS — Z12.31 VISIT FOR SCREENING MAMMOGRAM: ICD-10-CM

## 2019-09-10 ENCOUNTER — TELEPHONE (OUTPATIENT)
Dept: FAMILY MEDICINE | Facility: CLINIC | Age: 49
End: 2019-09-10

## 2019-09-10 DIAGNOSIS — E11.9 TYPE 2 DIABETES MELLITUS WITHOUT COMPLICATION, WITHOUT LONG-TERM CURRENT USE OF INSULIN (H): ICD-10-CM

## 2019-09-10 NOTE — TELEPHONE ENCOUNTER
Verify that the refill encounter hasn't been started Yes    [facility] phone call message- patient requesting a refill:    Full Medication Name: metFORMIN (GLUCOPHAGE) 500 MG tablet    Dose:      Pharmacy confirmed as   [facility] [address] - [place], [place] - [address]  [address]  [place] [address]  Phone: [phone] Fax: [phone]    [facility]  [address]  Phone: [phone] Fax: [phone]  : Yes    Medication tab checked to see if medication has been sent  No:  Additional Comments: pt request Rx refill pt states out of her med's would you please send ASAP thanks  OK to leave a message on voice mail? Yes    Advised patient refill may take up to 2 business days? yes    Primary language: English      needed? No    Call taken on September 10, 2019 at 4:46 PM by Kyler Castillo    Route to  S[facility] MED REFILL

## 2019-09-11 NOTE — TELEPHONE ENCOUNTER
"Request for medication refill: Metformin 500mg tabs    Providers if patient needs an appointment and you are willing to give a one month supply please refill for one month and  send a letter/MyChart using \".SMILLIMITEDREFILL\" .smillimited and route chart to \"P SMI \" (Giving one month refill in non controlled medications is strongly recommended before denial)    If refill has been denied, meaning absolutely no refills without visit, please complete the smart phrase \".smirxrefuse\" and route it to the \"P SMI MED REFILLS\"  pool to inform the patient and the pharmacy.    Le Patterson CMA        "

## 2019-09-11 NOTE — TELEPHONE ENCOUNTER
Patient calling to check status of medication refill, stating she is completely out of medication. Patient stated her anxiety is getting really bad d/t not having metformin, she is worried she will start to experience negative symptoms.

## 2019-09-11 NOTE — TELEPHONE ENCOUNTER
Resending Rx-refill request  for Metformin 500 Mg oral tablet (Take 2 tablets (1,000 mg) by mouth 2 times daily (with meals) approved my PCP to [facility] as requested.    Unique Storm RN

## 2019-10-30 DIAGNOSIS — E11.9 TYPE 2 DIABETES MELLITUS WITHOUT COMPLICATION, WITHOUT LONG-TERM CURRENT USE OF INSULIN (H): ICD-10-CM

## 2019-11-01 RX ORDER — LIRAGLUTIDE 6 MG/ML
INJECTION SUBCUTANEOUS
Qty: 9 ML | Refills: 1 | OUTPATIENT
Start: 2019-11-01

## 2019-12-03 ENCOUNTER — TELEPHONE (OUTPATIENT)
Dept: FAMILY MEDICINE | Facility: CLINIC | Age: 49
End: 2019-12-03

## 2019-12-03 NOTE — TELEPHONE ENCOUNTER
"Request for medication refill:    Pt requesting aquaphor ointment, not on current med list    Providers if patient needs an appointment and you are willing to give a one month supply please refill for one month and  send a letter/MyChart using \".SMILLIMITEDREFILL\" .smillimited and route chart to \"P SMI \" (Giving one month refill in non controlled medications is strongly recommended before denial)    If refill has been denied, meaning absolutely no refills without visit, please complete the smart phrase \".smirxrefuse\" and route it to the \"P SMI MED REFILLS\"  pool to inform the patient and the pharmacy.    Martha Cortez, [facility]        "

## 2019-12-06 ENCOUNTER — OFFICE VISIT (OUTPATIENT)
Dept: FAMILY MEDICINE | Facility: CLINIC | Age: 49
End: 2019-12-06
Payer: COMMERCIAL

## 2019-12-06 VITALS
DIASTOLIC BLOOD PRESSURE: 86 MMHG | HEART RATE: 84 BPM | TEMPERATURE: 98.7 F | OXYGEN SATURATION: 95 % | WEIGHT: 286 LBS | BODY MASS INDEX: 47.65 KG/M2 | SYSTOLIC BLOOD PRESSURE: 121 MMHG | HEIGHT: 65 IN

## 2019-12-06 DIAGNOSIS — J30.89 SEASONAL ALLERGIC RHINITIS DUE TO OTHER ALLERGIC TRIGGER: ICD-10-CM

## 2019-12-06 DIAGNOSIS — Z02.89 ENCOUNTER FOR COMPLETION OF FORM WITH PATIENT: ICD-10-CM

## 2019-12-06 DIAGNOSIS — E66.813 CLASS 3 SEVERE OBESITY DUE TO EXCESS CALORIES WITH SERIOUS COMORBIDITY AND BODY MASS INDEX (BMI) OF 40.0 TO 44.9 IN ADULT (H): ICD-10-CM

## 2019-12-06 DIAGNOSIS — J45.909 REACTIVE AIRWAY DISEASE WITHOUT COMPLICATION, UNSPECIFIED ASTHMA SEVERITY, UNSPECIFIED WHETHER PERSISTENT: ICD-10-CM

## 2019-12-06 DIAGNOSIS — Z23 NEED FOR PROPHYLACTIC VACCINATION AND INOCULATION AGAINST INFLUENZA: ICD-10-CM

## 2019-12-06 DIAGNOSIS — F41.0 PANIC DISORDER WITHOUT AGORAPHOBIA: Primary | ICD-10-CM

## 2019-12-06 DIAGNOSIS — H69.92 DYSFUNCTION OF LEFT EUSTACHIAN TUBE: ICD-10-CM

## 2019-12-06 DIAGNOSIS — E11.9 TYPE 2 DIABETES MELLITUS WITHOUT COMPLICATION, WITHOUT LONG-TERM CURRENT USE OF INSULIN (H): ICD-10-CM

## 2019-12-06 DIAGNOSIS — L85.3 DRY SKIN: ICD-10-CM

## 2019-12-06 DIAGNOSIS — M17.0 OSTEOARTHRITIS OF BOTH KNEES, UNSPECIFIED OSTEOARTHRITIS TYPE: ICD-10-CM

## 2019-12-06 DIAGNOSIS — E66.01 CLASS 3 SEVERE OBESITY DUE TO EXCESS CALORIES WITH SERIOUS COMORBIDITY AND BODY MASS INDEX (BMI) OF 40.0 TO 44.9 IN ADULT (H): ICD-10-CM

## 2019-12-06 LAB
BUN SERPL-MCNC: 11.7 MG/DL (ref 7–19)
CALCIUM SERPL-MCNC: 10 MG/DL (ref 8.5–10.1)
CHLORIDE SERPLBLD-SCNC: 99.1 MMOL/L (ref 98–110)
CO2 SERPL-SCNC: 30.4 MMOL/L (ref 20–32)
CREAT SERPL-MCNC: 0.6 MG/DL (ref 0.5–1)
CREAT UR-MCNC: 53 MG/DL
GFR SERPL CREATININE-BSD FRML MDRD: >90 ML/MIN/1.7 M2
GLUCOSE SERPL-MCNC: 172.6 MG'DL (ref 70–99)
HBA1C MFR BLD: 7.6 % (ref 4.1–5.7)
MICROALBUMIN UR-MCNC: 6 MG/L
MICROALBUMIN/CREAT UR: 11.49 MG/G CR (ref 0–25)
POTASSIUM SERPL-SCNC: 3.8 MMOL/DL (ref 3.3–4.5)
SODIUM SERPL-SCNC: 137.5 MMOL/L (ref 132.6–141.4)

## 2019-12-06 RX ORDER — MINERAL OIL/HYDROPHIL PETROLAT
OINTMENT (GRAM) TOPICAL PRN
Qty: 420 G | Refills: 1 | Status: SHIPPED | OUTPATIENT
Start: 2019-12-06 | End: 2020-01-22

## 2019-12-06 RX ORDER — MENTHOL 1.4 %
ADHESIVE PATCH, MEDICATED TOPICAL EVERY 6 HOURS PRN
Qty: 113 G | Refills: 3 | Status: SHIPPED | OUTPATIENT
Start: 2019-12-06

## 2019-12-06 RX ORDER — FLUTICASONE PROPIONATE 50 MCG
1 SPRAY, SUSPENSION (ML) NASAL DAILY
Qty: 15.8 ML | Refills: 1 | Status: SHIPPED | OUTPATIENT
Start: 2019-12-06 | End: 2020-02-03

## 2019-12-06 RX ORDER — ALBUTEROL SULFATE 0.83 MG/ML
2.5 SOLUTION RESPIRATORY (INHALATION) EVERY 6 HOURS PRN
Qty: 3 ML | Refills: 1 | Status: SHIPPED | OUTPATIENT
Start: 2019-12-06

## 2019-12-06 RX ORDER — ALBUTEROL SULFATE 90 UG/1
2 AEROSOL, METERED RESPIRATORY (INHALATION) EVERY 4 HOURS PRN
Qty: 2 INHALER | Refills: 3 | Status: SHIPPED | OUTPATIENT
Start: 2019-12-06 | End: 2020-03-25

## 2019-12-06 ASSESSMENT — MIFFLIN-ST. JEOR: SCORE: 1918.17

## 2019-12-06 NOTE — PROGRESS NOTES
Preceptor Attestation:   Patient seen and discussed with the resident. Assessment and plan reviewed with resident and agreed upon.   Supervising Physician:  Deshaun Martin MD  [facility]

## 2019-12-06 NOTE — PATIENT INSTRUCTIONS
Here is the plan from today's visit    1. Type 2 diabetes mellitus without complication, without long-term current use of insulin (H)  A1C is 7.6  Urine test pending  - Hemoglobin A1c ([facility])  - Basic Metabolic Panel ([facility])  - Albumin Random Urine Quantitative with Creat Ratio    2. Class 3 severe obesity due to excess calories with serious comorbidity and body mass index (BMI) of 40.0 to 44.9 in adult (H)      3. Reactive airway disease without complication, unspecified asthma severity, unspecified whether persistent    - albuterol (PROAIR HFA/PROVENTIL HFA/VENTOLIN HFA) 108 (90 Base) MCG/ACT inhaler; Inhale 2 puffs into the lungs every 4 hours as needed for shortness of breath / dyspnea or wheezing  Dispense: 2 Inhaler; Refill: 3  - albuterol (PROVENTIL) (2.5 MG/3ML) 0.083% neb solution; Take 1 vial (2.5 mg) by nebulization every 6 hours as needed for shortness of breath / dyspnea or wheezing  Dispense: 3 mL; Refill: 1    4. Osteoarthritis of both knees, unspecified osteoarthritis type    - Menthol-Methyl Salicylate (OCTAVIO CONTI GREASELESS) cream; Apply topically every 6 hours as needed (pain)  Dispense: 113 g; Refill: 3    5. Seasonal allergic rhinitis due to other allergic trigger    - fluticasone (FLONASE) 50 MCG/ACT nasal spray; Spray 1 spray into both nostrils daily  Dispense: 15.8 mL; Refill: 1    6. Dry skin    - mineral oil-hydrophilic petrolatum (AQUAPHOR) external ointment; Apply topically as needed for dry skin  Dispense: 420 g; Refill: 1      Please call or return to clinic if your symptoms don't go away.    Follow up plan  Come back in 2-3 weeks to talk about starting an anxiety med    Thank you for coming to [facility] today.  Lab Testing:  **If you had lab testing today and your results are reassuring or normal they will be mailed to you or sent through [employer] within 7 days.   **If the lab tests need quick action we will call you with the results.  The phone number we will call with results  is # [phone] (home) . If this is not the best number please call our clinic and change the number.  Medication Refills:  If you need any refills please call your pharmacy and they will contact us.   If you need to  your refill at a new pharmacy, please contact the new pharmacy directly. The new pharmacy will help you get your medications transferred faster.   Scheduling:  If you have any concerns about today's visit or wish to schedule another appointment please call our office during normal business hours [phone] (8-5:00 M-F)  If a referral was made to a [facility] and you don't get a call from central scheduling please call [phone].  If a Mammogram was ordered for you at [facility] call [phone] to schedule or change your appointment.  If you had an XRay/CT/Ultrasound/MRI ordered the number is [phone] to schedule or change your radiology appointment.   Medical Concerns:  If you have urgent medical concerns please call [phone] at any time of the day.    David Vera,

## 2019-12-06 NOTE — PROGRESS NOTES
"       HPI       Misty Girard is a 49 year old  who presents for   Chief Complaint   Patient presents with     RECHECK     F/U Medication & get labs      Here with girlfriend Rachell for support- lives with her in a jail house    Her mom just recently had a health scare, which motivated Misty to come back in and see me to talk about her health. She's tired of being so overweight and is worried about that on her long term DM effects. She feels guilty for the effort we go to to rx her meds, and she ends up not taking them due to fear/panic. She didn't start any of the GLP1/DPP4s we went back and forth about earlier this year because of this. Wants her A1C re-checked and is interested in trying again.    Re: mental health. Now graduated from [employer]. Staying sober, no concerns there. Working with Rachell Sachin Turpin CNP for psych. About to start intensive outpatient treatment (both for psych and substance use). Did a [facility] swab with her psych NP, and results did show she was sensitive to many different meds.    Additionally, needs some refills today for some creams and albuterol, and would like me to look in her L ear which occasionally pains her. No trauma or discharge. Recently had a URI. Also needs a diet form filled out.    Problem, Medication and Allergy Lists were reviewed and updated if needed..    Patient is an established patient of this clinic..         Review of Systems:   Review of Systems         Physical Exam:     Vitals:    12/06/19 1624   BP: 121/86   BP Location: Left arm   Patient Position: Sitting   Cuff Size: Adult Large   Pulse: 84   Temp: 98.7  F (37.1  C)   TempSrc: Oral   SpO2: 95%   Weight: 129.7 kg (286 lb)   Height: 1.651 m (5' 5\")     Body mass index is 47.59 kg/m .  Vitals were reviewed and were normal     Physical Exam  Constitutional:       Appearance: She is obese.   HENT:      Head: Normocephalic and atraumatic.      Right Ear: Tympanic membrane, ear canal and external " ear normal.      Left Ear: Ear canal and external ear normal.      Ears:      Comments: L middle ear effusion.     Nose: Nose normal.      Mouth/Throat:      Mouth: Mucous membranes are moist.      Pharynx: Oropharynx is clear.   Neck:      Musculoskeletal: Neck supple.   Lymphadenopathy:      Cervical: No cervical adenopathy.   Neurological:      General: No focal deficit present.      Mental Status: She is alert and oriented to person, place, and time.   Psychiatric:         Judgment: Judgment normal.      Comments: Baseline anxiety and occasional rocking, still much better than where she was a year ago. Speech normal rate, clear, logical, linear. Well groomed.            Results:      Results from this visit  Results for orders placed or performed in visit on 12/06/19   Hemoglobin A1c ([facility])     Status: Abnormal   Result Value Ref Range    Hemoglobin A1C 7.6 (H) 4.1 - 5.7 %   Basic Metabolic Panel ([facility])     Status: Abnormal   Result Value Ref Range    Calcium 10.0 8.5 - 10.1 mg/dL    Chloride 99.1 98.0 - 110.0 mmol/L    Carbon Dioxide 30.4 20.0 - 32.0 mmol/L    Creatinine 0.6 0.5 - 1.0 mg/dL    Glucose 172.6 (H) 70.0 - 99.0 mg'dL    Potassium 3.8 3.3 - 4.5 mmol/dL    Sodium 137.5 132.6 - 141.4 mmol/L    GFR Estimate >90 >60.0 mL/min/1.7 m2    GFR Estimate If Black >90 >60.0 mL/min/1.7 m2    Urea Nitrogen 11.7 7.0 - 19.0 mg/dL   Albumin Random Urine Quantitative with Creat Ratio     Status: None   Result Value Ref Range    Creatinine Urine 53 mg/dL    Albumin Urine mg/L 6 mg/L    Albumin Urine mg/g Cr 11.49 0 - 25 mg/g Cr       Assessment and Plan      Misty is a 48 yo cisgender woman with PMH panic disorder, BPD, prior meth use, class 3 obesity and uncontrolleed A1C (goal < 7.0).    1. Panic disorder without agoraphobia  Discussed with patient that our main stumbling block seems to be her panic disorder/follow up when managing her weight and DM. Has appropriate behavioral follow up for therapy and a  dedicated psych provider. BENJAMIN filled out for psych provider today to view [facility] results. Based on these, would ask patient to discuss medication beyond her prns with her psych provider (such as serotonin specific reuptake inhibitor/snri options).    2. Type 2 diabetes mellitus without complication, without long-term current use of insulin (H)  3. Class 3 severe obesity due to excess calories with serious comorbidity and body mass index (BMI) of 40.0 to 44.9 in adult (H)  Lots of reassurance today, A1C is only 0.6 above goal. Will explore lifestyle diet/exercise with patient at next visit. Re: meds, had tried to get patient on Victoza in May with endo. Switch attempted to Ozempic in early June because patient concerned about hypoglycemia despite reassurance. Tried to switch to PO Januvia because patient continued to have severe anxiety about any injectable medication despite education/reassurance, and also patient found using injectable medications too similar to prior experience with meth. Patient declined starting Januvia.  Will follow up in 2-3 weeks to discuss psych medicine to help with her panic disorder, and if she wants to proceed with DM meds beyond her metformin as well (which is what I would recommend).  Could try to rx januvia again, since I still think patient would have a better time with compliance for PO med. Could also try SGLT2-inhibitor.    - Hemoglobin A1c ([facility])  - Basic Metabolic Panel ([facility])  - Albumin Random Urine Quantitative with Creat Ratio    4. Dysfunction of left eustachian tube  Reassurance provided    5. Reactive airway disease without complication, unspecified asthma severity, unspecified whether persistent    - albuterol (PROAIR HFA/PROVENTIL HFA/VENTOLIN HFA) 108 (90 Base) MCG/ACT inhaler; Inhale 2 puffs into the lungs every 4 hours as needed for shortness of breath / dyspnea or wheezing  Dispense: 2 Inhaler; Refill: 3  - albuterol (PROVENTIL) (2.5 MG/3ML) 0.083% neb  solution; Take 1 vial (2.5 mg) by nebulization every 6 hours as needed for shortness of breath / dyspnea or wheezing  Dispense: 3 mL; Refill: 1    6. Osteoarthritis of both knees, unspecified osteoarthritis type    - Menthol-Methyl Salicylate (OCTAVIO CONTI GREASELESS) cream; Apply topically every 6 hours as needed (pain)  Dispense: 113 g; Refill: 3    7. Seasonal allergic rhinitis due to other allergic trigger    - fluticasone (FLONASE) 50 MCG/ACT nasal spray; Spray 1 spray into both nostrils daily  Dispense: 15.8 mL; Refill: 1    8. Dry skin    - mineral oil-hydrophilic petrolatum (AQUAPHOR) external ointment; Apply topically as needed for dry skin  Dispense: 420 g; Refill: 1    9. Need for prophylactic vaccination and inoculation against influenza    - Fluzone quad, preserve-free/prefilled  0.5ml, 6+ months [42125]    10. Encounter for completion of form with patient  Low fat diet rx'd and form copied for chart.       Medications Discontinued During This Encounter   Medication Reason     ciprofloxacin (CIPRO) 250 MG tablet      fluocinolone (SYNALAR) 0.01 % external cream      albuterol (PROAIR HFA/PROVENTIL HFA/VENTOLIN HFA) 108 (90 Base) MCG/ACT inhaler Reorder     Menthol-Methyl Salicylate (OCTAVIO CONTI GREASELESS) cream Reorder       Options for treatment and follow-up care were reviewed with the patient. Misty Girard  engaged in the decision making process and verbalized understanding of the options discussed and agreed with the final plan.    DO [facility] Resident PGY-2  [phone]

## 2020-01-06 PROBLEM — F34.1 PERSISTENT DEPRESSIVE DISORDER: Status: ACTIVE | Noted: 2019-02-26

## 2020-01-06 PROBLEM — Z78.9 HISTORY OF INCARCERATION: Status: ACTIVE | Noted: 2020-01-06

## 2020-01-06 PROBLEM — F32.A DEPRESSION: Status: RESOLVED | Noted: 2019-02-26 | Resolved: 2020-01-06

## 2020-01-06 PROBLEM — F15.21 METHAMPHETAMINE USE DISORDER, SEVERE, IN SUSTAINED REMISSION (H): Status: ACTIVE | Noted: 2020-01-06

## 2020-01-18 NOTE — PROGRESS NOTES
"       HPI       Misty Girard is a 49 year old  who presents for   Chief Complaint   Patient presents with     RECHECK     Anxiety, no concerns per pt.     1. Panic disorder  Got the records from Rachell Stephenson (psych NP), but they only say the genesight testing was done, doesn't say the result.  Patient has the printout at home, will send me via [employer].  Only taking her propranolol a couple times a week now for anxiety.    2. DM  Diet now: Cereal, french fries, hamburgers, burritos, \"everything with o's\", fast food a lot, pizza. Has 5 salads a week. States she used to do better about dietary changes while in [employer], but managing the transition threw off her diet. When asked if there were financial difficulties with maintaining a healthier diet, the patient replied \"oh no! I'm wearing Doc Jimmy, what do you think?\"  Not currently exercising.  Wonders if she should have bariatric surgery for weight loss to help with her diabetes. She finds that less frightening than the thought of starting a new med for DM (even if it's just a pill).   Re: DM meds, patient already has some issues with yeast in intertrigenous areas, so not in favor of SGLT2. Still too scared of januvia too.    3. Rash  Rash under her pannus that is itchy and painful and raw for the last couple of days to a week. Nothing draining. She's trying to keep it dry with paper towels to pad, but it's not helping.    Patient is here with her girlfriend for support.    Problem, Medication and Allergy Lists were reviewed and updated if needed..    Patient is an established patient of this clinic..         Review of Systems:   Review of Systems         Physical Exam:     Vitals:    01/21/20 1556   BP: 131/88   BP Location: Left arm   Patient Position: Sitting   Cuff Size: Adult Regular   Pulse: 84   Resp: 16   Temp: 98.5  F (36.9  C)   TempSrc: Oral   SpO2: 96%   Weight: 131.5 kg (290 lb)   Height: 1.647 m (5' 4.86\")     Body mass index is 48.46 " kg/m .  Vitals were reviewed and were normal     Physical Exam  Constitutional:       Appearance: She is normal weight.      Comments: Bright colored makeup and clothes matching her girlfriend   Cardiovascular:      Rate and Rhythm: Normal rate and regular rhythm.      Heart sounds: Normal heart sounds.   Pulmonary:      Effort: Pulmonary effort is normal. No respiratory distress.      Breath sounds: Normal breath sounds.   Skin:     Comments: Macerated patch under R pannus with paper towel adherent to it, no drainage or fluctuence, minimal heat.    Neurological:      General: No focal deficit present.      Mental Status: She is alert and oriented to person, place, and time.   Psychiatric:      Comments: Mood cheerful, minimal anxiety, minimal rocking, speech normal, thought content linear and logical           Results:   Results from last visit:  Office Visit on 12/06/2019   Component Date Value Ref Range Status     Hemoglobin A1C 12/06/2019 7.6* 4.1 - 5.7 % Final     Calcium 12/06/2019 10.0  8.5 - 10.1 mg/dL Final     Chloride 12/06/2019 99.1  98.0 - 110.0 mmol/L Final     Carbon Dioxide 12/06/2019 30.4  20.0 - 32.0 mmol/L Final     Creatinine 12/06/2019 0.6  0.5 - 1.0 mg/dL Final     Glucose 12/06/2019 172.6* 70.0 - 99.0 mg'dL Final     Potassium 12/06/2019 3.8  3.3 - 4.5 mmol/dL Final     Sodium 12/06/2019 137.5  132.6 - 141.4 mmol/L Final     GFR Estimate 12/06/2019 >90  >60.0 mL/min/1.7 m2 Final     GFR Estimate If Black 12/06/2019 >90  >60.0 mL/min/1.7 m2 Final     Urea Nitrogen 12/06/2019 11.7  7.0 - 19.0 mg/dL Final     Creatinine Urine 12/06/2019 53  mg/dL Final     Albumin Urine mg/L 12/06/2019 6  mg/L Final     Albumin Urine mg/g Cr 12/06/2019 11.49  0 - 25 mg/g Cr Final       Assessment and Plan      Misty is a 50 yo cisgender woman who presents with:    1. Type 2 diabetes mellitus without complication, without long-term current use of insulin (H)  2. Class 3 severe obesity due to excess calories with  serious comorbidity and body mass index (BMI) of 45.0 to 49.9 in adult (H)  Discussed general risks/benefits to weight loss surgery. Heavily cautioned patient that surgery alone will not help - needs the lifestyle interventions. Discussed general dietary recs of increasing veggies (1/2 her plate), and really limiting fast food and fried foods. Also recommending re-starting exercise. Patient amenable.    - metformin refills given  - Lipid [facility] ([facility])  - BARIATRIC ADULT REFERRAL - INTERNAL    3. Panniculitis  Keep area clean/dry and use topical antifungal.  - nystatin (MYCOSTATIN) 455889 UNIT/GM external cream; Apply topically 2 times daily  Dispense: 30 g; Refill: 1       Medications Discontinued During This Encounter   Medication Reason     metFORMIN (GLUCOPHAGE) 500 MG tablet Reorder       Options for treatment and follow-up care were reviewed with the patient. Misty Girard  engaged in the decision making process and verbalized understanding of the options discussed and agreed with the final plan.    DO [facility] Resident PGY-2  [phone]

## 2020-01-21 ENCOUNTER — OFFICE VISIT (OUTPATIENT)
Dept: PHARMACY | Facility: PHYSICIAN GROUP | Age: 50
End: 2020-01-21
Payer: COMMERCIAL

## 2020-01-21 ENCOUNTER — OFFICE VISIT (OUTPATIENT)
Dept: FAMILY MEDICINE | Facility: CLINIC | Age: 50
End: 2020-01-21
Payer: COMMERCIAL

## 2020-01-21 VITALS
OXYGEN SATURATION: 96 % | SYSTOLIC BLOOD PRESSURE: 131 MMHG | HEIGHT: 65 IN | DIASTOLIC BLOOD PRESSURE: 88 MMHG | TEMPERATURE: 98.5 F | HEART RATE: 84 BPM | RESPIRATION RATE: 16 BRPM | BODY MASS INDEX: 48.32 KG/M2 | WEIGHT: 290 LBS

## 2020-01-21 DIAGNOSIS — E11.9 TYPE 2 DIABETES MELLITUS WITHOUT COMPLICATION, WITHOUT LONG-TERM CURRENT USE OF INSULIN (H): Primary | ICD-10-CM

## 2020-01-21 DIAGNOSIS — E11.9 TYPE 2 DIABETES MELLITUS WITHOUT COMPLICATION, WITHOUT LONG-TERM CURRENT USE OF INSULIN (H): ICD-10-CM

## 2020-01-21 DIAGNOSIS — E78.5 HYPERLIPIDEMIA LDL GOAL <70: ICD-10-CM

## 2020-01-21 DIAGNOSIS — E66.01 CLASS 3 SEVERE OBESITY DUE TO EXCESS CALORIES WITH SERIOUS COMORBIDITY AND BODY MASS INDEX (BMI) OF 45.0 TO 49.9 IN ADULT (H): ICD-10-CM

## 2020-01-21 DIAGNOSIS — I10 HYPERTENSION, ESSENTIAL, BENIGN: Primary | ICD-10-CM

## 2020-01-21 DIAGNOSIS — M79.3 PANNICULITIS: ICD-10-CM

## 2020-01-21 DIAGNOSIS — E66.813 CLASS 3 SEVERE OBESITY DUE TO EXCESS CALORIES WITH SERIOUS COMORBIDITY AND BODY MASS INDEX (BMI) OF 45.0 TO 49.9 IN ADULT (H): ICD-10-CM

## 2020-01-21 LAB
CHOLEST SERPL-MCNC: 145.2 MG/DL (ref 0–200)
CHOLEST/HDLC SERPL: 3.7 {RATIO} (ref 0–5)
HDLC SERPL-MCNC: 38.7 MG/DL
LDLC SERPL CALC-MCNC: 27 MG/DL (ref 0–129)
TRIGL SERPL-MCNC: 395.2 MG/DL (ref 0–150)
VLDL CHOLESTEROL: 79 MG/DL (ref 7–32)

## 2020-01-21 PROCEDURE — 99605 MTMS BY PHARM NP 15 MIN: CPT | Performed by: PHARMACIST

## 2020-01-21 RX ORDER — NYSTATIN 100000 U/G
CREAM TOPICAL 2 TIMES DAILY
Qty: 30 G | Refills: 1 | Status: SHIPPED | OUTPATIENT
Start: 2020-01-21 | End: 2020-06-02

## 2020-01-21 ASSESSMENT — MIFFLIN-ST. JEOR: SCORE: 1934.11

## 2020-01-21 ASSESSMENT — PATIENT HEALTH QUESTIONNAIRE - PHQ9
SUM OF ALL RESPONSES TO PHQ QUESTIONS 1-9: 13
5. POOR APPETITE OR OVEREATING: NEARLY EVERY DAY

## 2020-01-21 ASSESSMENT — ANXIETY QUESTIONNAIRES
1. FEELING NERVOUS, ANXIOUS, OR ON EDGE: NEARLY EVERY DAY
6. BECOMING EASILY ANNOYED OR IRRITABLE: MORE THAN HALF THE DAYS
7. FEELING AFRAID AS IF SOMETHING AWFUL MIGHT HAPPEN: NEARLY EVERY DAY
GAD7 TOTAL SCORE: 20
3. WORRYING TOO MUCH ABOUT DIFFERENT THINGS: NEARLY EVERY DAY
5. BEING SO RESTLESS THAT IT IS HARD TO SIT STILL: NEARLY EVERY DAY
2. NOT BEING ABLE TO STOP OR CONTROL WORRYING: NEARLY EVERY DAY
IF YOU CHECKED OFF ANY PROBLEMS ON THIS QUESTIONNAIRE, HOW DIFFICULT HAVE THESE PROBLEMS MADE IT FOR YOU TO DO YOUR WORK, TAKE CARE OF THINGS AT HOME, OR GET ALONG WITH OTHER PEOPLE: SOMEWHAT DIFFICULT

## 2020-01-21 NOTE — PATIENT INSTRUCTIONS
Here is the plan from today's visit    1. Type 2 diabetes mellitus without complication, without long-term current use of insulin (H)  I'll put in a referral for stomach surgery  Start increasing veggies and decreasing fast food  Don't worry about extra medicines at this point  - Lipid [place] ([facility])  - BARIATRIC ADULT REFERRAL - INTERNAL    2. Class 3 severe obesity due to excess calories with serious comorbidity and body mass index (BMI) of 40.0 to 44.9 in adult (H)  - Lipid [place] ([facility])  - BARIATRIC ADULT REFERRAL - INTERNAL    3. Panniculitis  Keep the area dry and clean as you can and use the cream twice a day until it's better  - nystatin (MYCOSTATIN) 583761 UNIT/GM external cream; Apply topically 2 times daily  Dispense: 30 g; Refill: 1    Please call or return to clinic if your symptoms don't go away.    Follow up plan  Please make a clinic appointment for follow up with me (HELLEN ARREGUIN) in 1-2  weeks for follow up and pap    Thank you for coming to [facility] today.  Lab Testing:  **If you had lab testing today and your results are reassuring or normal they will be mailed to you or sent through [employer] within 7 days.   **If the lab tests need quick action we will call you with the results.  The phone number we will call with results is # [phone] (home) . If this is not the best number please call our clinic and change the number.  Medication Refills:  If you need any refills please call your pharmacy and they will contact us.   If you need to  your refill at a new pharmacy, please contact the new pharmacy directly. The new pharmacy will help you get your medications transferred faster.   Scheduling:  If you have any concerns about today's visit or wish to schedule another appointment please call our office during normal business hours [phone] (8-5:00 M-F)  If a referral was made to a [facility] and you don't get a call from [place]  scheduling please call [phone].  If a Mammogram was ordered for you at [facility] call [phone] to schedule or change your appointment.  If you had an XRay/CT/Ultrasound/MRI ordered the number is [phone] to schedule or change your radiology appointment.   Medical Concerns:  If you have urgent medical concerns please call [phone] at any time of the day.    David Vera, DO    Weight Management referral  Gissel Kam Christie R Hi Christie,     Our referrals department doesn't schedule for Weight Management but I did click on the referral though to see the outcome and it looks like the patient received 2 calls and voicemails.     Have a great day,   Gissel    Previous Messages      ----- Message -----   From: Autumn Dan   Sent: 2/20/2020   8:54 AM CST   To: Referral Specialist Team     Good Morning,     Please see weight management referral written 1/21/20 and let me know status for my provider. Thank you.     Autumn Kovacs

## 2020-01-21 NOTE — PROGRESS NOTES
Preceptor Attestation:   Patient seen, evaluated and discussed with the resident. I have verified the content of the note, which accurately reflects my assessment of the patient and the plan of care.   Supervising Physician:  Jen Kumari MD

## 2020-01-21 NOTE — PROGRESS NOTES
"SUBJECTIVE/OBJECTIVE:                           Misty Girard is a 49 year old female coming in for an initial visit for Medication Therapy Management.  She was referred to me from Dr. Vera.    Chief Complaint: Med rec.    Allergies/ADRs: Reviewed in Epic  Tobacco:   Alcohol: not currently using  Caffeine: NA  Activity: NA  PMH: Reviewed in Epic    Medication Adherence/Access:  no issues reported    HTN: currently takes lisiniopril 2.5 mg once daily and Proprnolol 5-10 mg daily  Betablocker is used for anxiety and used as needed.  Misty did not like how the lisinopril made her feel and has decreased her dose of the medication.       DM: Currently taking metformin 2000 mg once daily  Has in the past been prescribed Semaglutide and Liraglutide but did not take these medications because of both insurance non-covered and fear of the medications caused and ALEXIS.      Hyperlipidemia : Atorvastatin, has previously been prescribed 20 mg once daily, but the patient was concerned about the high dose.  She continues to take the atorvastatin 10 mg once daily at bedtime.  No new complaints or concerns with the current dose and has been taking the medication appropriately.        Today's Vitals: LMP  (LMP Unknown)   BP Readings from Last 1 Encounters:   01/30/20 (!) 148/98     Pulse Readings from Last 1 Encounters:   01/30/20 77     Wt Readings from Last 1 Encounters:   01/21/20 290 lb (131.5 kg)     Ht Readings from Last 1 Encounters:   01/21/20 5' 4.86\" (1.647 m)     Estimated body mass index is 48.46 kg/m  as calculated from the following:    Height as of an earlier encounter on 1/21/20: 5' 4.86\" (1.647 m).    Weight as of an earlier encounter on 1/21/20: 290 lb (131.5 kg).    Temp Readings from Last 1 Encounters:   01/30/20 98.1  F (36.7  C) (Oral)     Lab Results   Component Value Date    A1C 7.6 12/06/2019    A1C 6.7 09/21/2018     ASCVD 10 year RISK:  Calculated 01/30/20   The 10-year ASCVD risk score (Kalpesh ROTHMAN Jr., et " al., 2013) is: 4.3%    Values used to calculate the score:      Age: 49 years      Sex: Female      Is Non- : No      Diabetic: Yes      Tobacco smoker: No      Systolic Blood Pressure: 148 mmHg      Is BP treated: Yes      HDL Cholesterol: 38.7 mg/dL      Total Cholesterol: 145.2 mg/dL     Therapy Plan:Moderate Intensity  (atorvastatin 10-20mg, rosuvastatin 5-10mg, simvastatin 20-40mg, pravastatin 40-80mg, lovastatin 40 mg)    49 year old  White, Ethnic Group.American,     BP Readings from Last 3 Encounters:   01/30/20 (!) 148/98   01/21/20 131/88   12/06/19 121/86     Lab Results   Component Value Date    CHOL 145.2 01/21/2020     Lab Results   Component Value Date    HDL 38.7 01/21/2020     Lab Results   Component Value Date    LDL 27 01/21/2020     Lab Results   Component Value Date    TRIG 395.2 01/21/2020     Lab Results   Component Value Date    CHOLHDLRATIO 3.7 01/21/2020     Current Outpatient Medications   Medication     acetaminophen (TYLENOL) 500 MG tablet     albuterol (PROAIR HFA/PROVENTIL HFA/VENTOLIN HFA) 108 (90 Base) MCG/ACT inhaler     albuterol (PROVENTIL) (2.5 MG/3ML) 0.083% neb solution     aspirin (ASA) 81 MG tablet     atorvastatin (LIPITOR) 10 MG tablet     blood glucose (NO BRAND SPECIFIED) lancets standard     blood glucose (NO BRAND SPECIFIED) test strip     calcium polycarbophil (FIBERCON) 625 MG tablet     DULoxetine (CYMBALTA) 20 MG capsule     fluticasone (FLONASE) 50 MCG/ACT nasal spray     hydrocortisone (WESTCORT) 0.2 % external cream     hydroxypropyl methylcellulose (GENTEAL) 0.2 % SOLN ophthalmic solution     hydrOXYzine (VISTARIL) 25 MG capsule     levonorgestrel (MIRENA) 20 MCG/24HR IUD     lisinopril (PRINIVIL/ZESTRIL) 2.5 MG tablet     Menthol-Methyl Salicylate (OCTAVIO CONTI GREASELESS) cream     metFORMIN (GLUCOPHAGE) 500 MG tablet     mineral oil-hydrophilic petrolatum (AQUAPHOR) external ointment     nystatin (MYCOSTATIN) 047942 UNIT/GM external cream      nystatin (MYCOSTATIN) ointment     Omega-3 Fatty Acids (FISH OIL OMEGA-3) 1000 MG CAPS     order for DME     order for DME     order for DME     order for DME     Prenatal Vit-Fe Fumarate-FA (PRENATAL MULTIVITAMIN W/IRON) 27-0.8 MG tablet     propranolol (INDERAL) 10 MG tablet     spacer (OPTICHAMBER GENARO) holding chamber     vitamin D3 (CHOLECALCIFEROL) 2000 units (50 mcg) tablet     No current facility-administered medications for this visit.    }      ASSESSMENT:                              Medication Adherence: good, no issues identified      HTN: has been concerned about taking lisinopril.  I have explained that the medication does not line up with the patient's concerns.  I have explained that this is a low dose and appropriately needed for her blood pressure management.  Patient agrees to restart the medication after our discussion.     DM:   Generally controlled with an A1c less than 8%.  This patient has a potential to have a goal of less than 7%, but considering her current behavioral health status and living situation she is appropriately managed at less than 8%.  She also has the desire not to start new medication, and has been working hard at adjusting her lifestyle choices as a method for her treatment of her diabetes.    Hyperlipidemia :   Patient is overall ASCVD risk score is low..  This is below 7.5% greater statin therapy is considered optional.  The patient has responded well to the 10 mg dose of atorvastatin.  Cholesterol levels today demonstrate that the patient is at goal, and may be overly treated with LDL at 27 mg/dL today.  Generally, when a statin is causing an LDL to goal below 40 the provider may consider reducing the dose.  At this time the patient is a very low dose of this medication and I am not sure that there is a benefit in reducing the medication further.        PLAN:                              Continue atorvastatin 10 mg once daily    Continue metformin 2000 mg once a  day    Continue lisinopril 2.5 mg once daily.    I spent 30 minutes with this patient today. Dr. Vera was provided the recommendations above  in clinic today and Dr. Martin was available for supervision during this visit and is the authorizing prescriber for this visit through the pharmacist collaborative practice agreement.. A copy of the visit note was provided to the patient's primary care provider.    Will follow up in 3 weeks.    The patient was given a summary of these recommendations as an after visit summary.     Sherice MeridaD.

## 2020-01-22 ASSESSMENT — ASTHMA QUESTIONNAIRES: ACT_TOTALSCORE: 21

## 2020-01-22 ASSESSMENT — ANXIETY QUESTIONNAIRES: GAD7 TOTAL SCORE: 20

## 2020-01-28 NOTE — PROGRESS NOTES
"       HPI       Misty Girard is a 49 year old  who presents for   Chief Complaint   Patient presents with     RECHECK     Gene sight testing, would like to go over results     Gyn Exam     Testing     would like to discuss artery testing that starts at age 50     1. Psych meds  Brought her copy of her Genesight testing with her - was done with psych NP Rachell Stephenson. On the list of antidepressants, cymbalta is listed as well metabolized, and Misty notes her sister is on cymbalta and doing well with this.  Ready to finally start a med for her anxiety - feels like she can't keep living like this. When it was really bad a few months ago, she reports taking her BP in her arm 30-70 times per day until her arm turned blue. Knows when she worries more, BP goes up, which makes her worry more. Feels her coping strategies learned in [employer] and counseling are helping her a good amount, but she wants to change and not feel so afraid all the time.  OK with starting a med today as long as we start low and go extremely slowly. Hasn't seen Rachell Stephenson in a couple months, about due to see her in a month.  Additionally, worried about her result on the last page of [employer] for heterozygous MTHFR mutation of C677T, and what this means about her folate/homocysteine levels (written as possibly low/high on the printout), and wonders if she should take a B vitamin? And if so what is the best one? Also very worried that something is wrong with her arteries because of this, and would like carotid/femoral artery ultrasounds. No h/o stroke, no vision changes, no cramping in legs with walking.    PHQ-9 SCORE 12/14/2018 1/21/2020 1/30/2020   PHQ-9 Total Score 6 13 9     ALONA-7 SCORE 12/14/2018 1/21/2020 1/30/2020   Total Score 12 20 7     2. RUQ pain  Last 2 weeks noted some RUQ/lower R rib pain with palpation and deep forward flexion. Afraid something is \"popping in and out\". Had her GB out years ago. No nausea/vomiting, association " "with foods, change in bowel habits.    3. Pap  Would like pap done today, last one was \"about a year ago\". Never had an abnormal pap.      Problem, Medication and Allergy Lists were reviewed and updated if needed..    Patient is an established patient of this clinic..         Review of Systems:   Review of Systems         Physical Exam:     Vitals:    01/30/20 1004 01/30/20 1010   BP: (!) 138/95 (!) 148/98   BP Location: Left arm Left arm   Patient Position: Sitting Sitting   Cuff Size: Adult Regular Adult Regular   Pulse: 77    Resp: 16    Temp: 98.1  F (36.7  C)    TempSrc: Oral    SpO2: 96%      There is no height or weight on file to calculate BMI.  Vitals were reviewed and were normal except mildly elevated BP     Physical Exam  Exam conducted with a chaperone present (medical student present).   Constitutional:       General: She is not in acute distress.     Appearance: She is obese.   Cardiovascular:      Rate and Rhythm: Normal rate and regular rhythm.      Heart sounds: Normal heart sounds.      Comments: No pedal edema, limbs wwp  Pulmonary:      Effort: Pulmonary effort is normal. No respiratory distress.      Breath sounds: Normal breath sounds.   Abdominal:      General: There is no distension.      Palpations: Abdomen is soft. There is no mass.      Tenderness: There is no abdominal tenderness. There is no guarding. Negative signs include Santos's sign.      Hernia: A hernia (large ventral wall hernia) is present.       Genitourinary:     General: Normal vulva.      Comments: Introitus tightened, Vaginal mucosa atrophic with physiologic discharge, cervix deviated left with IUD strings visualized - no lesions or CMT, uterus midline, no adenexal ttp.  Skin:     General: Skin is warm and dry.   Neurological:      General: No focal deficit present.      Mental Status: She is alert and oriented to person, place, and time.   Psychiatric:      Comments: Anxious, occasionally rocking back and forth (still " much better than a year ago)           Results:   Results are ordered and pending    Assessment and Plan       Misty is a 50 yo cisgender woman who presents with:      1. Anxiety disorder, unspecified type  2. Generalized anxiety disorder  Long discussion today counseling about cymbalta pros/cons - patient finally agreeable to starting a medication. Reassured patient many times this is a very small dose, advised not to break apart as these are capsules, not tablets. Will keep patient on 20 mg and not titrate for quite some time - f/u in 2 weeks to monitor for SE. Advised patient to f/u with psych NP Rachell Stephenson in 1 month, and she can continue to manage low and slow titer. Will likely need to end on higher range of cymbalta dosing for severe anxiety.  [employer] testing copied and placed into scan basket.    - DULoxetine (CYMBALTA) 20 MG capsule; Take 1 capsule (20 mg) by mouth daily  Dispense: 30 capsule; Refill: 0      3. Heterozygous MTHFR mutation C677T (H)  Reassured patient she is asymptomatic and does not require carotid/femoral US testing. Will test for folate/B12 abnormalities given this mutation, which is fair since no recent CBC on file. Did not ask re: peripheral neuropathy symptoms as these can be also experienced during panic attack and with DM, and patient would likely fixate on this to an unhealthy degree. Results and recs by Mir[facility].  - CBC with platelets differential  - VITAMIN B12  - FOLATE    4. RUQ abdominal pain  Costochondritis  No hepatomegaly or abdominal ttp on exam today, no evidence of a hernia by her cholecystectomy scars. Likely costochondritis of R lower ribs due to excessive patient palpation of self. Will test hepatic panel to ensure no hepato-biliary abnormalities - results by mirta. Reassurance provided.   - Hepatic Panel ([facility])    5. Screening for cervical cancer  IUD in place  Difficult exam today 2/2 body habitus - use longest graves speculum for any future gyn exams.  Reassured/educated patient if this test is normal, next pap due in 5 years, not 1 as she'd thought. Mirena IUD well in place - appropriate to keep in for endometrial suppression given body habitus.  - Pap imaged thin layer screen with HPV - recommended age 30 - 65 years (select HPV order below)       There are no discontinued medications.    Options for treatment and follow-up care were reviewed with the patient. Misty Girard  engaged in the decision making process and verbalized understanding of the options discussed and agreed with the final plan.    DO [facility] Resident PGY-2  [phone]

## 2020-01-30 ENCOUNTER — OFFICE VISIT (OUTPATIENT)
Dept: FAMILY MEDICINE | Facility: CLINIC | Age: 50
End: 2020-01-30
Payer: COMMERCIAL

## 2020-01-30 VITALS
TEMPERATURE: 98.1 F | RESPIRATION RATE: 16 BRPM | HEART RATE: 77 BPM | SYSTOLIC BLOOD PRESSURE: 148 MMHG | OXYGEN SATURATION: 96 % | DIASTOLIC BLOOD PRESSURE: 98 MMHG

## 2020-01-30 DIAGNOSIS — Z15.89 HETEROZYGOUS MTHFR MUTATION C677T: ICD-10-CM

## 2020-01-30 DIAGNOSIS — R10.11 RUQ ABDOMINAL PAIN: ICD-10-CM

## 2020-01-30 DIAGNOSIS — Z12.4 SCREENING FOR CERVICAL CANCER: ICD-10-CM

## 2020-01-30 DIAGNOSIS — Z97.5 IUD (INTRAUTERINE DEVICE) IN PLACE: ICD-10-CM

## 2020-01-30 DIAGNOSIS — F41.9 ANXIETY DISORDER, UNSPECIFIED TYPE: Primary | ICD-10-CM

## 2020-01-30 DIAGNOSIS — F41.1 GENERALIZED ANXIETY DISORDER: ICD-10-CM

## 2020-01-30 DIAGNOSIS — M94.0 COSTOCHONDRITIS: ICD-10-CM

## 2020-01-30 LAB
ALBUMIN SERPL-MCNC: 4.8 MG/DL (ref 3.8–5)
ALP SERPL-CCNC: 77.6 U/L (ref 31.7–110.5)
ALT SERPL-CCNC: 43.5 U/L (ref 0–45)
AST SERPL-CCNC: 22.7 U/L (ref 0–45)
BASOPHILS # BLD AUTO: 0.1 10E9/L (ref 0–0.2)
BASOPHILS NFR BLD AUTO: 1 %
BILIRUB SERPL-MCNC: 0.6 MG/DL (ref 0.2–1.3)
BILIRUBIN DIRECT: 0.2 MG/DL (ref 0.1–0.3)
DIFFERENTIAL METHOD BLD: NORMAL
EOSINOPHIL # BLD AUTO: 0.2 10E9/L (ref 0–0.7)
EOSINOPHIL NFR BLD AUTO: 2.9 %
ERYTHROCYTE [DISTWIDTH] IN BLOOD BY AUTOMATED COUNT: 12.5 % (ref 10–15)
FOLATE SERPL-MCNC: 67.5 NG/ML
HCT VFR BLD AUTO: 44.8 % (ref 35–47)
HGB BLD-MCNC: 14.7 G/DL (ref 11.7–15.7)
IMM GRANULOCYTES # BLD: 0 10E9/L (ref 0–0.4)
IMM GRANULOCYTES NFR BLD: 0.1 %
LYMPHOCYTES # BLD AUTO: 2.7 10E9/L (ref 0.8–5.3)
LYMPHOCYTES NFR BLD AUTO: 37.2 %
MCH RBC QN AUTO: 29.3 PG (ref 26.5–33)
MCHC RBC AUTO-ENTMCNC: 32.8 G/DL (ref 31.5–36.5)
MCV RBC AUTO: 89 FL (ref 78–100)
MONOCYTES # BLD AUTO: 0.7 10E9/L (ref 0–1.3)
MONOCYTES NFR BLD AUTO: 8.9 %
NEUTROPHILS # BLD AUTO: 3.7 10E9/L (ref 1.6–8.3)
NEUTROPHILS NFR BLD AUTO: 49.9 %
NRBC # BLD AUTO: 0 10*3/UL
NRBC BLD AUTO-RTO: 0 /100
PLATELET # BLD AUTO: 287 10E9/L (ref 150–450)
PROT SERPL-MCNC: 7.1 G/DL (ref 6.8–8.8)
RBC # BLD AUTO: 5.01 10E12/L (ref 3.8–5.2)
VIT B12 SERPL-MCNC: 463 PG/ML (ref 193–986)
WBC # BLD AUTO: 7.3 10E9/L (ref 4–11)

## 2020-01-30 RX ORDER — DULOXETIN HYDROCHLORIDE 20 MG/1
20 CAPSULE, DELAYED RELEASE ORAL DAILY
Qty: 30 CAPSULE | Refills: 0 | Status: SHIPPED | OUTPATIENT
Start: 2020-01-30 | End: 2020-03-04

## 2020-01-30 ASSESSMENT — ANXIETY QUESTIONNAIRES
3. WORRYING TOO MUCH ABOUT DIFFERENT THINGS: MORE THAN HALF THE DAYS
5. BEING SO RESTLESS THAT IT IS HARD TO SIT STILL: NOT AT ALL
2. NOT BEING ABLE TO STOP OR CONTROL WORRYING: SEVERAL DAYS
1. FEELING NERVOUS, ANXIOUS, OR ON EDGE: MORE THAN HALF THE DAYS
7. FEELING AFRAID AS IF SOMETHING AWFUL MIGHT HAPPEN: NOT AT ALL
IF YOU CHECKED OFF ANY PROBLEMS ON THIS QUESTIONNAIRE, HOW DIFFICULT HAVE THESE PROBLEMS MADE IT FOR YOU TO DO YOUR WORK, TAKE CARE OF THINGS AT HOME, OR GET ALONG WITH OTHER PEOPLE: SOMEWHAT DIFFICULT
GAD7 TOTAL SCORE: 7
6. BECOMING EASILY ANNOYED OR IRRITABLE: SEVERAL DAYS

## 2020-01-30 ASSESSMENT — PATIENT HEALTH QUESTIONNAIRE - PHQ9
SUM OF ALL RESPONSES TO PHQ QUESTIONS 1-9: 9
5. POOR APPETITE OR OVEREATING: SEVERAL DAYS

## 2020-01-30 NOTE — PATIENT INSTRUCTIONS
Here is the plan from today's visit    1. Anxiety disorder, unspecified type  Start this medicine and take it once a day.  - DULoxetine (CYMBALTA) 20 MG capsule; Take 1 capsule (20 mg) by mouth daily  Dispense: 30 capsule; Refill: 0    2. Heterozygous MTHFR mutation C677T (H)    - CBC with platelets differential  - VITAMIN B12  - FOLATE    3. RUQ abdominal pain    - Hepatic Panel ([facility])    4. Screening for cervical cancer    - Pap imaged thin layer screen with HPV - recommended age 30 - 65 years (select HPV order below)      Please call or return to clinic if your symptoms don't go away.    Follow up plan  See me in 2 weeks    Thank you for coming to [facility] today.  Lab Testing:  **If you had lab testing today and your results are reassuring or normal they will be mailed to you or sent through [employer] within 7 days.   **If the lab tests need quick action we will call you with the results.  The phone number we will call with results is # [phone] (home) . If this is not the best number please call our clinic and change the number.  Medication Refills:  If you need any refills please call your pharmacy and they will contact us.   If you need to  your refill at a new pharmacy, please contact the new pharmacy directly. The new pharmacy will help you get your medications transferred faster.   Scheduling:  If you have any concerns about today's visit or wish to schedule another appointment please call our office during normal business hours [phone] (8-5:00 M-F)  If a referral was made to a [facility] and you don't get a call from central scheduling please call [phone].  If a Mammogram was ordered for you at [facility] call [phone] to schedule or change your appointment.  If you had an XRay/CT/Ultrasound/MRI ordered the number is [phone] to schedule or change your radiology appointment.   Medical Concerns:  If you have urgent medical concerns please  call [phone] at any time of the day.    Agueda Vera, DO

## 2020-01-30 NOTE — PROGRESS NOTES
Preceptor Attestation:   Patient seen and discussed with the resident. Assessment and plan reviewed with resident and agreed upon.   Supervising Physician:  DO [facility]

## 2020-01-31 ENCOUNTER — MYC MEDICAL ADVICE (OUTPATIENT)
Dept: FAMILY MEDICINE | Facility: CLINIC | Age: 50
End: 2020-01-31

## 2020-01-31 ASSESSMENT — ANXIETY QUESTIONNAIRES: GAD7 TOTAL SCORE: 7

## 2020-02-03 LAB
COPATH REPORT: NORMAL
PAP: NORMAL

## 2020-02-05 ENCOUNTER — TELEPHONE (OUTPATIENT)
Dept: FAMILY MEDICINE | Facility: CLINIC | Age: 50
End: 2020-02-05

## 2020-02-05 DIAGNOSIS — R87.616 SATISFACTORY CERVICAL SMEAR BUT LACKING TRANSFORMATION ZONE: Primary | ICD-10-CM

## 2020-02-05 LAB
FINAL DIAGNOSIS: ABNORMAL
HPV HR 12 DNA CVX QL NAA+PROBE: POSITIVE
HPV16 DNA SPEC QL NAA+PROBE: NEGATIVE
HPV18 DNA SPEC QL NAA+PROBE: NEGATIVE
SPECIMEN DESCRIPTION: ABNORMAL
SPECIMEN SOURCE CVX/VAG CYTO: ABNORMAL

## 2020-02-05 NOTE — TELEPHONE ENCOUNTER
PCP: Agueda Vera  49 year old    Pap on 1/30/20 Result: NIL  HPV positive for other HR type  Pertinent History: menopausal, IUD in place    Plan: Cot[facility] (pap and HPV) in 12 months    Problem list (and overview) has been updated: Yes  Health care Maintenance has been updated: Yes    Patient contacted with results and plan via Telephone and Mychart    DO [facility] Resident PGY-2  [phone]

## 2020-02-05 NOTE — RESULT ENCOUNTER NOTE
Haris Jay, here's the last part of your pap. It looks like there is one of the HPV viruses that can cause those pre-cancer changes present, but no pre-cancer changes to the cells themselves. About 90% of the time your immune system clears the virus on its own and folks are fine. What we'll do is repeat your pap and virus test in 1 year (nice close follow up) to make sure it's gone. If it's not gone by then, or we start to see changes to your cells, then we talk about procedures to remove abnormal cells. I am not worried about this, I see plenty of people do just fine. We just need that repeat test in a year.  Please message me if you have any concerns.  Sincerely,  David Vera, DO

## 2020-02-10 ENCOUNTER — TELEPHONE (OUTPATIENT)
Dept: FAMILY MEDICINE | Facility: CLINIC | Age: 50
End: 2020-02-10

## 2020-02-10 DIAGNOSIS — E11.9 TYPE 2 DIABETES MELLITUS WITHOUT COMPLICATION, WITHOUT LONG-TERM CURRENT USE OF INSULIN (H): Primary | ICD-10-CM

## 2020-02-10 NOTE — TELEPHONE ENCOUNTER
"Dr. Vera per [facility] pt lost her diabetes meter, I do not see one on the current medication list to pend. Please advise.     Request for medication refill:    Providers if patient needs an appointment and you are willing to give a one month supply please refill for one month and  send a letter/MyChart using \".SMILLIMITEDREFILL\" .smillimited and route chart to \"P SMI \" (Giving one month refill in non controlled medications is strongly recommended before denial)    If refill has been denied, meaning absolutely no refills without visit, please complete the smart phrase \".smirxrefuse\" and route it to the \"P [facility]MI MED REFILLS\"  pool to inform the patient and the pharmacy.    Cheri Farias, ARLET 2:28 PM February 10, 2020          "

## 2020-02-28 ENCOUNTER — TELEPHONE (OUTPATIENT)
Dept: FAMILY MEDICINE | Facility: CLINIC | Age: 50
End: 2020-02-28

## 2020-02-28 NOTE — TELEPHONE ENCOUNTER
Attempted to reach patient to reschedule monthly follow up appointment with . Patient cancelled 2/26/20 appointment. Left message on patient voicemail, along with direct number.    Autumn Dan  Care Coordinator

## 2020-03-03 DIAGNOSIS — M71.20 SYNOVIAL CYST OF KNEE, UNSPECIFIED LATERALITY: ICD-10-CM

## 2020-03-03 DIAGNOSIS — F41.9 ANXIETY DISORDER, UNSPECIFIED TYPE: ICD-10-CM

## 2020-03-03 DIAGNOSIS — E55.9 VITAMIN D DEFICIENCY: ICD-10-CM

## 2020-03-03 NOTE — TELEPHONE ENCOUNTER
"Request for medication refill: acetaminophen (TYLENOL) 500 MG tablet and DULoxetine (CYMBALTA) 20 MG capsule    Providers if patient needs an appointment and you are willing to give a one month supply please refill for one month and  send a letter/MyChart using \".SMILLIMITEDREFILL\" .smillimited and route chart to \"P [facility] \" (Giving one month refill in non controlled medications is strongly recommended before denial)    If refill has been denied, meaning absolutely no refills without visit, please complete the smart phrase \".smirxrefuse\" and route it to the \"P [facility] MED REFILLS\"  pool to inform the patient and the pharmacy.    VERÓNICA Robison MA        "

## 2020-03-03 NOTE — TELEPHONE ENCOUNTER
"Request for medication refill: vitamin D3 (CHOLECALCIFEROL) 2000 units (50 mcg) tablet    Providers if patient needs an appointment and you are willing to give a one month supply please refill for one month and  send a letter/MyChart using \".SMILLIMITEDREFILL\" .smillimited and route chart to \"P [facility] \" (Giving one month refill in non controlled medications is strongly recommended before denial)    If refill has been denied, meaning absolutely no refills without visit, please complete the smart phrase \".smirxrefuse\" and route it to the \"P SMI MED REFILLS\"  pool to inform the patient and the pharmacy.    Jean Long MA        "

## 2020-03-04 RX ORDER — ACETAMINOPHEN 500 MG
500-1000 TABLET ORAL EVERY 8 HOURS PRN
Qty: 100 TABLET | Refills: 3 | Status: SHIPPED | OUTPATIENT
Start: 2020-03-04 | End: 2020-08-10

## 2020-03-04 RX ORDER — DULOXETIN HYDROCHLORIDE 20 MG/1
20 CAPSULE, DELAYED RELEASE ORAL DAILY
Qty: 30 CAPSULE | Refills: 0 | Status: SHIPPED | OUTPATIENT
Start: 2020-03-04

## 2020-03-04 RX ORDER — CHOLECALCIFEROL (VITAMIN D3) 50 MCG
2000 TABLET ORAL DAILY
Qty: 90 TABLET | Refills: 1 | Status: SHIPPED | OUTPATIENT
Start: 2020-03-04

## 2020-03-05 NOTE — TELEPHONE ENCOUNTER
1 limited refill given for cymbalta. Mychart message sent to remind patient for follow up appt before further refills - will need to titrate up dose.  David Vera,

## 2020-03-25 DIAGNOSIS — Z00.00 HEALTHCARE MAINTENANCE: ICD-10-CM

## 2020-03-25 DIAGNOSIS — J45.909 REACTIVE AIRWAY DISEASE WITHOUT COMPLICATION, UNSPECIFIED ASTHMA SEVERITY, UNSPECIFIED WHETHER PERSISTENT: ICD-10-CM

## 2020-03-25 RX ORDER — ALBUTEROL SULFATE 90 UG/1
2 AEROSOL, METERED RESPIRATORY (INHALATION) EVERY 4 HOURS PRN
Qty: 2 INHALER | Refills: 3 | Status: SHIPPED | OUTPATIENT
Start: 2020-03-25 | End: 2020-06-10

## 2020-03-25 RX ORDER — PNV NO.95/FERROUS FUM/FOLIC AC 28MG-0.8MG
1000 TABLET ORAL DAILY PRN
Qty: 90 CAPSULE | Refills: 3 | Status: SHIPPED | OUTPATIENT
Start: 2020-03-25

## 2020-03-25 NOTE — TELEPHONE ENCOUNTER
"Request for medication refill: albuterol (PROAIR HFA/PROVENTIL HFA/VENTOLIN HFA) 108 (90 Base) MCG/ACT inhaler  AND Omega-3 Fatty Acids (FISH OIL OMEGA-3) 1000 MG CAPS     Providers if patient needs an appointment and you are willing to give a one month supply please refill for one month and  send a letter/MyChart using \".SMILLIMITEDREFILL\" .smillimited and route chart to \"P [facility]MI \" (Giving one month refill in non controlled medications is strongly recommended before denial)    If refill has been denied, meaning absolutely no refills without visit, please complete the smart phrase \".smirxrefuse\" and route it to the \"P [facility] MED REFILLS\"  pool to inform the patient and the pharmacy.    Jean Long MA        "

## 2020-05-06 ENCOUNTER — TELEPHONE (OUTPATIENT)
Dept: FAMILY MEDICINE | Facility: CLINIC | Age: 50
End: 2020-05-06

## 2020-05-06 DIAGNOSIS — U07.1 COVID-19 VIRUS INFECTION: Primary | ICD-10-CM

## 2020-05-06 NOTE — TELEPHONE ENCOUNTER
[facility] phone call message-patient reporting a symptom:     Symptom:COVID     When did symptoms begin? 05/022020    Characteristics: (location on body, intensity, what makes it better or worse, associated symptoms):      Additional Details: positive covid      Same Day Visit Offered: Yes, declined    Additional comments: pt want to speak with dr wilburn regards  pt went emergence 05/02/202 /05/03/2020/  was positive Covid 19 like to discuss her  pcp no a  detailed  provider would you please call    OK to leave message on voice mail? Yes    Advised patient that RN would call back within 3 hours, unless emergent   Primary language: English      needed? No    Call taken on May 6, 2020 at 1:08 PM by Kyler Castillo

## 2020-05-06 NOTE — TELEPHONE ENCOUNTER
"Called Misty back about COVID positive testing. Thinks her symptoms started 4/22 with profound myalgias after helping her parents move, which would make today Day 14 of symptoms. Overall she feels her symptoms are on the mend. She's staying isolated in [employer] in her room. She still has sore throat and a mild cough, breathing is doing quite well, no fever. When she gets worried, she lies on her stomach because of her sleep apnea.  Drinking tea and gatorade to stay hydrated  Has a home pulse ox: 92-94%, Pulse 80s    Feels very alone and scared.  Both her parents are in [facility] and just received serum to treat them for COVID-19. Thinks they're in the ICU, getting info from her sister. Says her mom has become delirious in the hospital and her dad is requiring high levels of O2, but she was able to speak to him on the phone yesterday.    Patient's breathing is not labored and she is able to speak in clear sentences without pausing or coughing, voice is hoarse. Mildly pressured speech and anxious affect.      Clarified interpretation of CRP and potassium from her ER visit.   Provided information and reassurance about course of COVID. Discussed sunlight and COVID-19 is likely more helpful for fomite disinfection rather than helping to shorten duration of symptoms in an individual, but sunlight is likely helpful for mental wellbeing.  Placed referral for [facility]- patient agreeable.    Thought she had a UTI - started 4 days ago with some urethral pain and burning, took 2 pyridium, then hasn't really noticed any further symptoms. Not sure if she has any dysuria now because she is so anxious. Has mild nausea x1, no vomiting. Just feels overall \"blah\" from COVID.  Plan: will hold off on further pyridium and monitor symptoms. Should dysuria or persistent pelvic pain return, would tx empirically with antibiotics given her h/o T2DM.    DO [facility] Resident PGY-2  [phone]    "

## 2020-05-06 NOTE — TELEPHONE ENCOUNTER
Called and spoke with patient to follow up and obtain more information. Pt not in acute distress, was treated/evalutated in ER/ED for COVID 19 on 05/02/2020 and discharged- Follow up  ED/ER appointment scheduled.    Message/Request for PCP-telephone request  routed to primary provider, will continue to follow up.    Unique Storm RN

## 2020-05-07 ENCOUNTER — MYC MEDICAL ADVICE (OUTPATIENT)
Dept: FAMILY MEDICINE | Facility: CLINIC | Age: 50
End: 2020-05-07

## 2020-05-07 DIAGNOSIS — U07.1 COVID-19 VIRUS INFECTION: Primary | ICD-10-CM

## 2020-05-11 RX ORDER — DIPHENHYDRAMINE HCL 25 MG
25 CAPSULE ORAL EVERY 6 HOURS PRN
Qty: 30 CAPSULE | Refills: 4 | Status: SHIPPED | OUTPATIENT
Start: 2020-05-11

## 2020-05-13 DIAGNOSIS — E11.9 TYPE 2 DIABETES MELLITUS WITHOUT COMPLICATION, WITHOUT LONG-TERM CURRENT USE OF INSULIN (H): ICD-10-CM

## 2020-05-13 NOTE — TELEPHONE ENCOUNTER

## 2020-05-15 DIAGNOSIS — I10 BENIGN ESSENTIAL HYPERTENSION: ICD-10-CM

## 2020-05-15 RX ORDER — PRENATAL VIT/IRON FUM/FOLIC AC 27MG-0.8MG
1 TABLET ORAL DAILY
Qty: 100 TABLET | Refills: 3 | Status: SHIPPED | OUTPATIENT
Start: 2020-05-15

## 2020-05-15 NOTE — TELEPHONE ENCOUNTER
"Request for medication refill: Prenatal Vit-Fe Fumarate-FA (PRENATAL MULTIVITAMIN W/IRON) 27-0.8 MG tablet     Providers if patient needs an appointment and you are willing to give a one month supply please refill for one month and  send a letter/MyChart using \".SMILLIMITEDREFILL\" .smillimited and route chart to \"P [facility] \" (Giving one month refill in non controlled medications is strongly recommended before denial)    If refill has been denied, meaning absolutely no refills without visit, please complete the smart phrase \".smirxrefuse\" and route it to the \"P [facility] MED REFILLS\"  pool to inform the patient and the pharmacy.    Sakshi Montez, [facility]        "

## 2020-06-01 ENCOUNTER — TELEPHONE (OUTPATIENT)
Dept: FAMILY MEDICINE | Facility: CLINIC | Age: 50
End: 2020-06-01

## 2020-06-01 DIAGNOSIS — M79.3 PANNICULITIS: ICD-10-CM

## 2020-06-01 NOTE — TELEPHONE ENCOUNTER
[facility] phone call message - order or referral request from patient:     Order or referral being requested: Requested order Patient called requesting to get her labs redone post COVID. She would like to know if she can have them done over to see if the COVID affected her results.     Additional Details: She would like to get her Folate, C-Reactive Protein, Lipids, Hemoglobin and Electrolytes tested        OK to leave a message on voice mail? Yes    Primary language: English      needed? No    Call taken on June 1, 2020 at 1:02 PM by April Terry    Order request route to [facility] TRIAGE   Referrals Route to [facility] (Green/[place]/Purple) CARE COORDINATOR

## 2020-06-01 NOTE — TELEPHONE ENCOUNTER
Routing to PCP to advise on wether it is appropriate for her labs to be re-drawn and if so, please order.  Nargis Pino RN

## 2020-06-02 DIAGNOSIS — E11.9 TYPE 2 DIABETES MELLITUS WITHOUT COMPLICATION, WITHOUT LONG-TERM CURRENT USE OF INSULIN (H): ICD-10-CM

## 2020-06-02 RX ORDER — NYSTATIN 100000 U/G
CREAM TOPICAL
Qty: 30 G | Refills: 1 | Status: SHIPPED | OUTPATIENT
Start: 2020-06-02

## 2020-06-02 NOTE — TELEPHONE ENCOUNTER

## 2020-06-02 NOTE — TELEPHONE ENCOUNTER

## 2020-06-02 NOTE — TELEPHONE ENCOUNTER
Not sure if the patient means the labs drawn in the ER? Either way, she does not need any routine blood work or any COVID-19 re-test. Please reassure the patient that all her recent blood work is fine and normal except for the COVID result, which we have already discussed.    David Vera, DO

## 2020-06-10 DIAGNOSIS — E78.1 HYPERTRIGLYCERIDEMIA: ICD-10-CM

## 2020-06-10 DIAGNOSIS — I10 HYPERTENSION, ESSENTIAL, BENIGN: ICD-10-CM

## 2020-06-10 DIAGNOSIS — E11.9 CONTROLLED TYPE 2 DIABETES MELLITUS WITHOUT COMPLICATION, WITHOUT LONG-TERM CURRENT USE OF INSULIN (H): Primary | ICD-10-CM

## 2020-06-10 DIAGNOSIS — J45.909 REACTIVE AIRWAY DISEASE WITHOUT COMPLICATION, UNSPECIFIED ASTHMA SEVERITY, UNSPECIFIED WHETHER PERSISTENT: ICD-10-CM

## 2020-06-10 RX ORDER — ATORVASTATIN CALCIUM 20 MG/1
20 TABLET, FILM COATED ORAL DAILY
Qty: 90 TABLET | Refills: 3 | Status: SHIPPED | OUTPATIENT
Start: 2020-06-10

## 2020-06-10 RX ORDER — LISINOPRIL 2.5 MG/1
2.5 TABLET ORAL DAILY
Qty: 90 TABLET | Refills: 3 | Status: SHIPPED | OUTPATIENT
Start: 2020-06-10

## 2020-06-10 RX ORDER — ALBUTEROL SULFATE 90 UG/1
2 AEROSOL, METERED RESPIRATORY (INHALATION) EVERY 4 HOURS PRN
Qty: 2 INHALER | Refills: 4 | Status: SHIPPED | OUTPATIENT
Start: 2020-06-10

## 2020-06-10 NOTE — TELEPHONE ENCOUNTER
"Request for medication refill:   1. atorvastatin (LIPITOR) 20 MG - I don't see this dosage in patient's chart. Please advise.  2. albuterol (PROAIR HFA/PROVENTIL HFA/VENTOLIN HFA) 108 (90 Base) MCG/ACT inhaler   3. lisinopril (PRINIVIL/ZESTRIL) 2.5 MG tablet    Providers if patient needs an appointment and you are willing to give a one month supply please refill for one month and  send a letter/MyChart using \".SMILLIMITEDREFILL\" .smillimited and route chart to \"P [facility] \" (Giving one month refill in non controlled medications is strongly recommended before denial)    If refill has been denied, meaning absolutely no refills without visit, please complete the smart phrase \".smirxrefuse\" and route it to the \"P [facility] MED REFILLS\"  pool to inform the patient and the pharmacy.    Sakshi Montez, CMA        "

## 2020-06-16 ENCOUNTER — TELEPHONE (OUTPATIENT)
Dept: SURGERY | Facility: CLINIC | Age: 50
End: 2020-06-16

## 2020-06-16 NOTE — TELEPHONE ENCOUNTER
Patient interested in weight loss surgery    Misty Girard    Spoke to patient regarding appointment on 06/17/2020.    Scheduled to see yKra Tuttle PA-C at 1:45pm, followed by an appointment with a dietitian at 3:00pm.    Instructed to check with insurance company regarding exclusions prior to first appt.    Can find information on bariatrix products at: [email or web address]    Instructed to view seminar and complete pre-visit questionnaire prior to visit at [email or web address].    [phone] contact center phone number      Winnie Basurto CMA

## 2020-06-17 ENCOUNTER — TELEPHONE (OUTPATIENT)
Dept: ENDOCRINOLOGY | Facility: CLINIC | Age: 50
End: 2020-06-17

## 2020-06-17 ENCOUNTER — VIRTUAL VISIT (OUTPATIENT)
Dept: SURGERY | Facility: CLINIC | Age: 50
End: 2020-06-17
Payer: COMMERCIAL

## 2020-06-17 VITALS — HEIGHT: 66 IN | BODY MASS INDEX: 46.45 KG/M2 | WEIGHT: 289 LBS

## 2020-06-17 DIAGNOSIS — E66.01 MORBID OBESITY (H): ICD-10-CM

## 2020-06-17 DIAGNOSIS — E11.9 TYPE 2 DIABETES MELLITUS WITHOUT COMPLICATION, WITHOUT LONG-TERM CURRENT USE OF INSULIN (H): Primary | ICD-10-CM

## 2020-06-17 RX ORDER — LIRAGLUTIDE 6 MG/ML
INJECTION SUBCUTANEOUS
Qty: 9 ML | Refills: 1 | Status: SHIPPED | OUTPATIENT
Start: 2020-06-17

## 2020-06-17 ASSESSMENT — PAIN SCALES - GENERAL: PAINLEVEL: NO PAIN (0)

## 2020-06-17 ASSESSMENT — MIFFLIN-ST. JEOR: SCORE: 1939.71

## 2020-06-17 NOTE — PROGRESS NOTES
"Misty Girard is a 49 year old female who is being evaluated via a billable video visit.      The patient has been notified of following:     \"This video visit will be conducted via a call between you and your physician/provider. We have found that certain health care needs can be provided without the need for an in-person physical exam.  This service lets us provide the care you need with a video conversation.  If a prescription is necessary we can send it directly to your pharmacy.  If lab work is needed we can place an order for that and you can then stop by our lab to have the test done at a later time.    Video visits are billed at different rates depending on your insurance coverage.  Please reach out to your insurance provider with any questions.    If during the course of the call the physician/provider feels a video visit is not appropriate, you will not be charged for this service.\"    Patient has given verbal consent for Video visit? Yes    Will anyone else be joining your video visit? No    PATIENT:  Misty Girard  :          1970  ROLF:          2020    The patient is seen at the consultation request of Dr. Vera for obesity associated with DM-2.    Patient is interested in attaining a healthier weight to diminish current health problems related to co-morbid conditions.    She describes her weight history as a gradual gain, and is secondary to treatment for mental health issues with psychotropics: . Her weight gain started as an adult.    Her previous attempts at weight loss include a self-imposed calorie restriction and exercise. Patient has had minimal success.    2 years sober, went through [place] [employer] hx of meth use.  Severe anxiety/panic attacks  Anxiety about taking meds PTSD seeing people overdose in intermediate  Type 2 DM has endocrinologist. Last A1C 7.6 19. Taking metformin.      Patient Active Problem List   Diagnosis     Generalized anxiety disorder     Secondary " dysmenorrhea     Fatigue     Hypothyroidism     CARDIOVASCULAR SCREENING; LDL GOAL LESS THAN 160     Methamphetamine use (H)     Controlled type 2 diabetes mellitus without complication, without long-term current use of insulin (H)     Borderline personality disorder (H)     ACP (advance care planning)     Diabetic peripheral neuropathy (H)     Family history of coronary artery disease     History of tobacco abuse     Hypertension, essential, benign     Hypertriglyceridemia     Morbid obesity with BMI of 40.0-44.9, adult (H)     Osteoarthritis of left knee     Panic disorder without agoraphobia     PTSD (post-traumatic stress disorder)     Vitamin D deficiency     IUD (intrauterine device) in place     Persistent depressive disorder     Eczema, unspecified type     Abnormal x-ray of lung     Methamphetamine use disorder, severe, in sustained remission (H)     History of incarceration     Heterozygous MTHFR mutation C677T (H)     Satisfactory cervical smear but lacking transformation zone     Current Outpatient Medications   Medication     acetaminophen (TYLENOL) 500 MG tablet     albuterol (PROAIR HFA/PROVENTIL HFA/VENTOLIN HFA) 108 (90 Base) MCG/ACT inhaler     albuterol (PROVENTIL) (2.5 MG/3ML) 0.083% neb solution     aspirin (ASA) 81 MG EC tablet     atorvastatin (LIPITOR) 20 MG tablet     blood glucose (NO BRAND SPECIFIED) lancets standard     blood glucose (NO BRAND SPECIFIED) test strip     blood glucose monitoring (NO BRAND SPECIFIED) meter device kit     calcium polycarbophil (FIBERCON) 625 MG tablet     diphenhydrAMINE (BENADRYL) 25 MG capsule     DULoxetine (CYMBALTA) 20 MG capsule     fluticasone (FLONASE) 50 MCG/ACT nasal spray     hydrocortisone (WESTCORT) 0.2 % external cream     hydroxypropyl methylcellulose (GENTEAL) 0.2 % SOLN ophthalmic solution     hydrOXYzine (VISTARIL) 25 MG capsule     levonorgestrel (MIRENA) 20 MCG/24HR IUD     lisinopril (ZESTRIL) 2.5 MG tablet     Menthol-Methyl Salicylate  (OCTAVIO CONTI GREASELESS) cream     metFORMIN (GLUCOPHAGE) 500 MG tablet     mineral oil-hydrophilic petrolatum (AQUAPHOR) external ointment     nystatin (MYCOSTATIN) 429974 UNIT/GM external cream     nystatin (MYCOSTATIN) ointment     Omega-3 Fatty Acids (FISH OIL OMEGA-3) 1000 MG CAPS     order for DME     order for DME     order for DME     order for DME     phenol-menthol (CEPASTAT) 14.5 MG lozenge     Prenatal Vit-Fe Fumarate-FA (PRENATAL MULTIVITAMIN W/IRON) 27-0.8 MG tablet     propranolol (INDERAL) 10 MG tablet     spacer (OPTICHAMBER GENARO) holding chamber     vitamin D3 (CHOLECALCIFEROL) 2000 units (50 mcg) tablet     No current facility-administered medications for this visit.        Today's weight: 289 lbs 0 oz  Current Body Mass Index:  Body mass index is 47.36 kg/m .    Most weight loss:  7 lbs lbs.  Highest weight:  289 lbs.    Work/Social History: Misty is on disablitity for mental health. She describes her home life as stable. Resides in a structured environment in sober housing. Her marital status is unknown. She has no overweight/obese child or spouse. She reports that she quit smoking about 2 years ago. She has never used smokeless tobacco. She reports that she does not drink alcohol or use drugs.    Food Life: Misty eats a high carb diet, eats a high fat diet, uses food as a reward, uses food as mood management, has perception of intense hunger and eats to obtain specific degree of fullness.    Misty has an activity pattern that is sedentary.     ASSESSMENT:  Misty is a patient with mature onset obesity with significant element of familial/genetic influence and with current health consequences. She does need aggressive weight loss plan due to BMI 47.    Her problem is complicated by a hunger disorder, strong craving/reward pathways and mental health/psychopharmacological barriers    Her ability to lose weight is impacted by inability to perceive that food intake is at a level that prevents weight  loss, lack of confidence and lack of motivation.    PLAN:      Start Ozempic .25mg weekly  Follow up in 1 month with Kyra and dietitian      Video-Visit Details    Type of service:  Video Visit    Video Start Time: 152  Video End Time: 210    Originating Location (pt. Location): Home    Distant Location (provider location):  [facility]NAG[facility]     Platform used for Video Visit: Caesar Tuttle PA-C

## 2020-06-17 NOTE — LETTER
2020       RE: Misty Girard  [address]  [place] [address]     Dear Colleague,    Thank you for referring your patient, Misty Girard, to the [facility] MANAGEMENT at [facility]. Please see a copy of my visit note below.    Misty Girard is a 49 year old female who is being evaluated via a billable video visit.      PATIENT:  Misty Girard  :          1970  ROLF:          2020    The patient is seen at the consultation request of Dr. Vera for obesity associated with DM-2.    Patient is interested in attaining a healthier weight to diminish current health problems related to co-morbid conditions.    She describes her weight history as a gradual gain, and is secondary to treatment for mental health issues with psychotropics: . Her weight gain started as an adult.    Her previous attempts at weight loss include a self-imposed calorie restriction and exercise. Patient has had minimal success.    2 years sober, went through [place] [employer] hx of meth use.  Severe anxiety/panic attacks  Anxiety about taking meds PTSD seeing people overdose in senior living  Type 2 DM has endocrinologist. Last A1C 7.6 19. Taking metformin.      Patient Active Problem List   Diagnosis     Generalized anxiety disorder     Secondary dysmenorrhea     Fatigue     Hypothyroidism     CARDIOVASCULAR SCREENING; LDL GOAL LESS THAN 160     Methamphetamine use (H)     Controlled type 2 diabetes mellitus without complication, without long-term current use of insulin (H)     Borderline personality disorder (H)     ACP (advance care planning)     Diabetic peripheral neuropathy (H)     Family history of coronary artery disease     History of tobacco abuse     Hypertension, essential, benign     Hypertriglyceridemia     Morbid obesity with BMI of 40.0-44.9, adult (H)     Osteoarthritis of left knee     Panic disorder without agoraphobia     PTSD (post-traumatic  stress disorder)     Vitamin D deficiency     IUD (intrauterine device) in place     Persistent depressive disorder     Eczema, unspecified type     Abnormal x-ray of lung     Methamphetamine use disorder, severe, in sustained remission (H)     History of incarceration     Heterozygous MTHFR mutation C677T (H)     Satisfactory cervical smear but lacking transformation zone     Current Outpatient Medications   Medication     acetaminophen (TYLENOL) 500 MG tablet     albuterol (PROAIR HFA/PROVENTIL HFA/VENTOLIN HFA) 108 (90 Base) MCG/ACT inhaler     albuterol (PROVENTIL) (2.5 MG/3ML) 0.083% neb solution     aspirin (ASA) 81 MG EC tablet     atorvastatin (LIPITOR) 20 MG tablet     blood glucose (NO BRAND SPECIFIED) lancets standard     blood glucose (NO BRAND SPECIFIED) test strip     blood glucose monitoring (NO BRAND SPECIFIED) meter device kit     calcium polycarbophil (FIBERCON) 625 MG tablet     diphenhydrAMINE (BENADRYL) 25 MG capsule     DULoxetine (CYMBALTA) 20 MG capsule     fluticasone (FLONASE) 50 MCG/ACT nasal spray     hydrocortisone (WESTCORT) 0.2 % external cream     hydroxypropyl methylcellulose (GENTEAL) 0.2 % SOLN ophthalmic solution     hydrOXYzine (VISTARIL) 25 MG capsule     levonorgestrel (MIRENA) 20 MCG/24HR IUD     lisinopril (ZESTRIL) 2.5 MG tablet     Menthol-Methyl Salicylate (OCTAVIO CONTI GREASELESS) cream     metFORMIN (GLUCOPHAGE) 500 MG tablet     mineral oil-hydrophilic petrolatum (AQUAPHOR) external ointment     nystatin (MYCOSTATIN) 203221 UNIT/GM external cream     nystatin (MYCOSTATIN) ointment     Omega-3 Fatty Acids (FISH OIL OMEGA-3) 1000 MG CAPS     order for DME     order for DME     order for DME     order for DME     phenol-menthol (CEPASTAT) 14.5 MG lozenge     Prenatal Vit-Fe Fumarate-FA (PRENATAL MULTIVITAMIN W/IRON) 27-0.8 MG tablet     propranolol (INDERAL) 10 MG tablet     spacer (OPTICHAMBER GENARO) holding chamber     vitamin D3 (CHOLECALCIFEROL) 2000 units (50 mcg) tablet      No current facility-administered medications for this visit.        Today's weight: 289 lbs 0 oz  Current Body Mass Index:  Body mass index is 47.36 kg/m .    Most weight loss:  7 lbs lbs.  Highest weight:  289 lbs.    Work/Social History: Misty is on disablitity for mental health. She describes her home life as stable. Resides in a structured environment in sober housing. Her marital status is unknown. She has no overweight/obese child or spouse. She reports that she quit smoking about 2 years ago. She has never used smokeless tobacco. She reports that she does not drink alcohol or use drugs.    Food Life: Misty eats a high carb diet, eats a high fat diet, uses food as a reward, uses food as mood management, has perception of intense hunger and eats to obtain specific degree of fullness.    Misty has an activity pattern that is sedentary.     ASSESSMENT:  Misty is a patient with mature onset obesity with significant element of familial/genetic influence and with current health consequences. She does need aggressive weight loss plan due to BMI 47.    Her problem is complicated by a hunger disorder, strong craving/reward pathways and mental health/psychopharmacological barriers    Her ability to lose weight is impacted by inability to perceive that food intake is at a level that prevents weight loss, lack of confidence and lack of motivation.    PLAN:      Start Ozempic .25mg weekly  Follow up in 1 month with Kyra and dietitian      Video-Visit Details    Type of service:  Video Visit    Video Start Time: 152  Video End Time: 210    Originating Location (pt. Location): Home    Distant Location (provider location):  [facility] SURGICAL WEIGHT MANAGEMENT     Platform used for Video Visit: Caesar Tuttle PA-C

## 2020-06-17 NOTE — PATIENT INSTRUCTIONS
Start Ozempic .25mg weekly  Follow up in 1 month with Kyra and dietitian    MEDICATION STARTED AT THIS APPOINTMENT    We are starting a GLP-1 (Glucagon-like Peptide-1) medication. Examples of this medication include Byetta, Bydureon, or Victoza, etc. The medication chosen will depend on insurance coverage, and can be changed to the medication that is covered under your plan. These medications work the same, in that they can help you lose weight and control your blood sugar. One of the ways it works is by slowing down the rate that food leaves your stomach. You feel barcenas and will eat less.  It also helps regulate hormones that can help improve your blood sugars.    Usually short acting insulins or oral agents are stopped or decreased by the doctor at the appointment. Low blood sugars are rare but can happen if patients are on insulin or other oral agents. If you notice consistent low sugars or high sugars, your medication may need to be adjusted after your appointment. If this is the case, please call RN and provide her your blood sugar record from the last 3-4 days. The RN will get in touch with the doctor and call you back/Artsicle message with recommendations. We tolerate high sugars for a bit, so if sugars are running 180-200, this is ok. As weight starts dropping the blood sugars should too. If readings are consistently over 200 for 1-2 weeks, then you should call the doctor/nurse.    Types of GLP-1 medications include:    Victoza: Starting dose is 0.6 mg for a week, then 1.2 mg for a week, and then 1.8 mg thereafter (if prescribed by doctor). This medication is given daily. Pen needles need to be ordered also.      Side effects of GLP- Medications include: The most common side effects are all GI related and consist of: nausea, constipation, diarrhea, burping, or gassiness. Patients are advised to eat slowly and less, and nausea typically passes if people can stick it out.     The risk of pancreatitis  (inflammation of the pancreas) has been associated with this type of medication, but is very rare.  If you have had pancreatitis in the past, this medication may not be for you. Please let us know about any past history of pancreas problems.      Symptoms of pancreatitis include: Pain in your upper stomach area which  may travel to your back and be worse after eating. Your stomach area may be tender to the touch.  You may have vomiting or nausea and/or have a fever. If you should develop any of these symptoms, stop the medication and contact your primary care doctor. They will do a blood test to check for pancreatitis.         There is a small chance you may have some low blood sugar after taking the medication.   The signs of low blood sugar are:  o Weakness  o Shaky   o Hungry  o Sweating  o Confusion      See below for ways to treat low blood sugar without adding in lots of extra calories.      Treating Low Blood Sugar    If you have symptoms of low blood sugar (sweating, shaking, dizzy, confused) eat 15 grams of carbs and wait 15 minutes:      Glucose Tabs are best for sugars under 70 -  Dex4 or BD Glucose tablets are good, you will need to take 3-4 of these to equal 15 grams.       One small box of raisins    4 oz fruit juice box or   cup fruit juice    1 small apple    1 small banana      cup canned fruit in water      English muffin or a slice of bread with jelly     1 low fat frozen waffle with sugar-free syrup      cup cottage cheese with   cup frozen or fresh blueberries    1 cup skim or low-fat milk      cup whole grain cereal    4-6 crackers such as Triscuits      This medication is usually covered by insurance for patients with a diagnosis of Type 2 Diabetes. Depending on insurance coverage, the medication may be changed to a different formulary, but they all work in the same way. Sometimes a prior authorization is required, which may take up to 1-2 weeks for an insurance company to make a decision if  they will cover the medication. Please be patient, you will be notified either way.     Call the nurse at [phone] if you have any questions or concerns. (Do not stop taking it if you don't think it's working. For some people it works without them knowing it.)     In order to get refills of this or any medication we prescribe you must be seen in the medical weight mgmt clinic every 2-4 months. Please have your pharmacy fax a refill request to [phone].

## 2020-06-17 NOTE — TELEPHONE ENCOUNTER
Called patient as she was not in waiting rook for video call, pt declined RD visit at this time.     Maria Elena Flor, MS, RD, LD

## 2020-06-17 NOTE — NURSING NOTE
"(   Chief Complaint   Patient presents with     Weight Problem     New bariatric surgery visit.    )    ( Weight: 131.1 kg (289 lb)(Pt reported) )  ( Height: 166.4 cm (5' 5.5\")(Pt reported) )  ( BMI (Calculated): 47.36 )  (   )  ( Cumulative weight loss (lbs): 0 )  (   )  (   )  (   )  (   )    (   )  (   )  (   )  (   )  (   )  (   )  (   )    (   Patient Active Problem List   Diagnosis     Generalized anxiety disorder     Secondary dysmenorrhea     Fatigue     Hypothyroidism     CARDIOVASCULAR SCREENING; LDL GOAL LESS THAN 160     Methamphetamine use (H)     Controlled type 2 diabetes mellitus without complication, without long-term current use of insulin (H)     Borderline personality disorder (H)     ACP (advance care planning)     Diabetic peripheral neuropathy (H)     Family history of coronary artery disease     History of tobacco abuse     Hypertension, essential, benign     Hypertriglyceridemia     Morbid obesity with BMI of 40.0-44.9, adult (H)     Osteoarthritis of left knee     Panic disorder without agoraphobia     PTSD (post-traumatic stress disorder)     Vitamin D deficiency     IUD (intrauterine device) in place     Persistent depressive disorder     Eczema, unspecified type     Abnormal x-ray of lung     Methamphetamine use disorder, severe, in sustained remission (H)     History of incarceration     Heterozygous MTHFR mutation C677T (H)     Satisfactory cervical smear but lacking transformation zone    )  (   Current Outpatient Medications   Medication Sig Dispense Refill     acetaminophen (TYLENOL) 500 MG tablet Take 1-2 tablets (500-1,000 mg) by mouth every 8 hours as needed for mild pain 100 tablet 3     albuterol (PROAIR HFA/PROVENTIL HFA/VENTOLIN HFA) 108 (90 Base) MCG/ACT inhaler Inhale 2 puffs into the lungs every 4 hours as needed for shortness of breath / dyspnea or wheezing 2 Inhaler 4     albuterol (PROVENTIL) (2.5 MG/3ML) 0.083% neb solution Take 1 vial (2.5 mg) by nebulization every 6 " hours as needed for shortness of breath / dyspnea or wheezing 3 mL 1     aspirin (ASA) 81 MG EC tablet Take 1 tablet (81 mg) by mouth daily 90 tablet 1     atorvastatin (LIPITOR) 20 MG tablet Take 1 tablet (20 mg) by mouth daily 90 tablet 3     blood glucose (NO BRAND SPECIFIED) lancets standard Use to test blood sugar 1-2 times daily or as directed. 100 each 11     blood glucose (NO BRAND SPECIFIED) test strip Use to test blood sugars 2 times daily or as directed 100 strip 11     blood glucose monitoring (NO BRAND SPECIFIED) meter device kit Use to test blood sugar one times daily or as directed. 1 kit 0     calcium polycarbophil (FIBERCON) 625 MG tablet Take 2 tablets (1,250 mg) by mouth daily 180 tablet 3     diphenhydrAMINE (BENADRYL) 25 MG capsule Take 1 capsule (25 mg) by mouth every 6 hours as needed for itching or allergies 30 capsule 4     DULoxetine (CYMBALTA) 20 MG capsule Take 1 capsule (20 mg) by mouth daily 30 capsule 0     fluticasone (FLONASE) 50 MCG/ACT nasal spray SPRAY 1 SPRAY INTO BOTH NOSTRILS DAILY 16 g 3     hydrocortisone (WESTCORT) 0.2 % external cream Apply topically 2 times daily 45 g 1     hydroxypropyl methylcellulose (GENTEAL) 0.2 % SOLN ophthalmic solution Place 1 drop into both eyes 4 times daily as needed for dry eyes 1 Bottle 5     hydrOXYzine (VISTARIL) 25 MG capsule Take 1-2 capsules (25-50 mg) by mouth 4 times daily as needed for anxiety 90 capsule 3     levonorgestrel (MIRENA) 20 MCG/24HR IUD 1 Device by Intrauterine route       lisinopril (ZESTRIL) 2.5 MG tablet Take 1 tablet (2.5 mg) by mouth daily 90 tablet 3     Menthol-Methyl Salicylate (OCTAVIO CONTI GREASELESS) cream Apply topically every 6 hours as needed (pain) 113 g 3     metFORMIN (GLUCOPHAGE) 500 MG tablet Take 2 tablets (1,000 mg) by mouth 2 times daily (with meals) 120 tablet 3     mineral oil-hydrophilic petrolatum (AQUAPHOR) external ointment APPLY TOPICALLY AS NEEDED FOR DRY SKIN 396 g 3     nystatin (MYCOSTATIN)  797927 UNIT/GM external cream APPLY TOPICALLY TWICE A DAY 30 g 1     nystatin (MYCOSTATIN) ointment Apply topically 2 times daily       Omega-3 Fatty Acids (FISH OIL OMEGA-3) 1000 MG CAPS Take 1,000 mg by mouth daily as needed (prn) 90 capsule 3     order for DME Equipment being ordered: abdominal binder 1 Units 0     order for DME Equipment being ordered: Digital home blood pressure monitor kit with cuff 1 Device 0     order for DME Equipment being ordered: Aquaphor cream jar 2 Container 20     order for DME Equipment being ordered: Full spectrum 10,000 lux light box (Procedure code )  Will use 30 min every morning and afternoon in fall and winter months. 1 Units 0     phenol-menthol (CEPASTAT) 14.5 MG lozenge Place 1 lozenge inside cheek every 2 hours as needed for moderate pain or sore throat 36 lozenge 4     Prenatal Vit-Fe Fumarate-FA (PRENATAL MULTIVITAMIN W/IRON) 27-0.8 MG tablet Take 1 tablet by mouth daily 100 tablet 3     propranolol (INDERAL) 10 MG tablet Take 0.5-1 tablets (5-10 mg) by mouth 3 times daily as needed (anxiety) 90 tablet 3     spacer (OPTICHAMBER GENARO) holding chamber Holding/spacer device to use with inhaler. 1 each 0     vitamin D3 (CHOLECALCIFEROL) 2000 units (50 mcg) tablet Take 1 tablet (2,000 Units) by mouth daily 90 tablet 1    )  ( Diabetes Eval:    )    ( Pain Eval:  No Pain (0) )    ( Wound Eval:       )    (   History   Smoking Status     Former Smoker     Quit date: 3/1/2018   Smokeless Tobacco     Never Used    )    ( Signed By:  Winnie Basurto CMA; June 17, 2020; 1:24 PM )

## 2020-06-20 ENCOUNTER — MYC MEDICAL ADVICE (OUTPATIENT)
Dept: FAMILY MEDICINE | Facility: CLINIC | Age: 50
End: 2020-06-20

## 2020-06-20 DIAGNOSIS — E78.1 HYPERTRIGLYCERIDEMIA: ICD-10-CM

## 2020-06-20 DIAGNOSIS — I10 HYPERTENSION, ESSENTIAL, BENIGN: Primary | ICD-10-CM

## 2020-06-20 DIAGNOSIS — E11.9 CONTROLLED TYPE 2 DIABETES MELLITUS WITHOUT COMPLICATION, WITHOUT LONG-TERM CURRENT USE OF INSULIN (H): ICD-10-CM

## 2020-06-20 DIAGNOSIS — J45.909 REACTIVE AIRWAY DISEASE WITHOUT COMPLICATION, UNSPECIFIED ASTHMA SEVERITY, UNSPECIFIED WHETHER PERSISTENT: ICD-10-CM

## 2020-07-02 ENCOUNTER — DOCUMENTATION ONLY (OUTPATIENT)
Dept: FAMILY MEDICINE | Facility: CLINIC | Age: 50
End: 2020-07-02

## 2020-07-02 NOTE — PROGRESS NOTES
"When opening a documentation only encounter, be sure to enter in \"Chief Complaint\" Forms and in \" Comments\" Title of form, description if needed.    Misty is a 50 year old  female  Form received via: Fax  Form now resides in: Provider Ready    Jean Long MA                  "

## 2020-07-06 NOTE — PROGRESS NOTES
Form has been completed by provider.     Form sent out via: Fax to [facility]  at Fax Number: [phone]  Patient informed: n/a  Output date: July 6, 2020    Jean Long MA      **Please close the encounter**

## 2020-07-06 NOTE — PROGRESS NOTES
Luz with [employer] calling to check status of order for pulse oximeter, which was faxed to [facility] on 6/26/20. Luz advised that provider needs to address 6 qualifying questions and provide a qualifying diagnosis.

## 2020-08-08 DIAGNOSIS — E66.01 MORBID OBESITY (H): ICD-10-CM

## 2020-08-08 DIAGNOSIS — E11.9 TYPE 2 DIABETES MELLITUS WITHOUT COMPLICATION, WITHOUT LONG-TERM CURRENT USE OF INSULIN (H): ICD-10-CM

## 2020-08-10 DIAGNOSIS — M71.20 SYNOVIAL CYST OF KNEE, UNSPECIFIED LATERALITY: ICD-10-CM

## 2020-08-10 RX ORDER — ACETAMINOPHEN 500 MG
500-1000 TABLET ORAL EVERY 8 HOURS PRN
Qty: 100 TABLET | Refills: 3 | Status: SHIPPED | OUTPATIENT
Start: 2020-08-10

## 2020-08-10 NOTE — TELEPHONE ENCOUNTER

## 2020-08-11 DIAGNOSIS — E11.9 TYPE 2 DIABETES MELLITUS WITHOUT COMPLICATION, WITHOUT LONG-TERM CURRENT USE OF INSULIN (H): ICD-10-CM

## 2020-08-11 NOTE — TELEPHONE ENCOUNTER

## 2020-08-12 RX ORDER — FLURBIPROFEN SODIUM 0.3 MG/ML
SOLUTION/ DROPS OPHTHALMIC
Qty: 100 EACH | Refills: 1 | Status: SHIPPED | OUTPATIENT
Start: 2020-08-12

## 2020-08-12 NOTE — TELEPHONE ENCOUNTER
6/17/2020  [facility]   Kyra Tuttle PA-C   Physician Assistant    insulin pen needle (31G X 5 MM) 31G X 5 MM miscellaneous

## 2020-08-25 ENCOUNTER — MYC MEDICAL ADVICE (OUTPATIENT)
Dept: FAMILY MEDICINE | Facility: CLINIC | Age: 50
End: 2020-08-25

## 2020-08-25 DIAGNOSIS — F41.9 ANXIETY: Primary | ICD-10-CM

## 2020-08-25 DIAGNOSIS — E11.9 CONTROLLED TYPE 2 DIABETES MELLITUS WITHOUT COMPLICATION, WITHOUT LONG-TERM CURRENT USE OF INSULIN (H): ICD-10-CM

## 2020-08-25 DIAGNOSIS — K76.0 FATTY LIVER: ICD-10-CM

## 2020-08-25 DIAGNOSIS — R87.616 SATISFACTORY CERVICAL SMEAR BUT LACKING TRANSFORMATION ZONE: ICD-10-CM

## 2020-08-25 NOTE — TELEPHONE ENCOUNTER
Called patient back per her request.  Staying in [place] to help out her brother recover from knee surgery, does plan to come back to [facility] and keep me as her PCP. She went to go see a primary there for a check up and felt scared/worried with a new provider. [place] her anxiety and panic come in and left.   Labs: A1C bumped to 9.0, LFTs increased 40s->50s, got a urine micro albumin test that she doesn't know how to interpret. Reading the disclaimers at the bottom of the test and worried she is going into liver failure. Has been rocking back and forth a whole lot more now, has pain under her R breast where her liver is.  Patient affect anxious, speech moderately pressured.  Interpreted picture provided of urine microalbumin ration as undetectable -> very good!  Discussed LFT bump likely due to fatty liver, A1C bump too I suspect is due to COVID-19 weight gain and inactivity.    Patient agrees. Saw a diabetic educator there and got put on jardiance. Is already on max dose metformin (I guess that means she self-discontinued her Victoza? Last ordered June 2020). Affirmed that I like her to keep working with diabetic educator on lifestyle management and I like the CV protection of jardiance.  Patient calmer by the end of our conversation, will stay with the [place] provider while she is there and keep to that plan.    Looks like [place] provider also got a pap, results pending (this is a repeat at 6 months from prior pap, plan had been to retest at 12 mo).  Also looks like she self-discontinued cymbalta.      When patient comes back to [facility], would like a med rec visit with MTM to avoid mixing up meds with this provider shifting.      DO [facility] Resident PGY-3  [phone]

## 2020-09-09 ENCOUNTER — MYC MEDICAL ADVICE (OUTPATIENT)
Dept: FAMILY MEDICINE | Facility: CLINIC | Age: 50
End: 2020-09-09

## 2020-09-09 DIAGNOSIS — R87.616 SATISFACTORY CERVICAL SMEAR BUT LACKING TRANSFORMATION ZONE: ICD-10-CM

## 2020-09-17 DIAGNOSIS — J30.89 SEASONAL ALLERGIC RHINITIS DUE TO OTHER ALLERGIC TRIGGER: ICD-10-CM

## 2020-09-17 RX ORDER — FLUTICASONE PROPIONATE 50 MCG
1 SPRAY, SUSPENSION (ML) NASAL DAILY
Qty: 16 G | Refills: 3 | Status: SHIPPED | OUTPATIENT
Start: 2020-09-17

## 2020-09-18 ENCOUNTER — MYC MEDICAL ADVICE (OUTPATIENT)
Dept: FAMILY MEDICINE | Facility: CLINIC | Age: 50
End: 2020-09-18

## 2020-09-30 DIAGNOSIS — K59.00 CONSTIPATION, UNSPECIFIED CONSTIPATION TYPE: ICD-10-CM

## 2020-09-30 RX ORDER — CALCIUM POLYCARBOPHIL 625 MG 625 MG/1
2 TABLET ORAL DAILY
Qty: 180 TABLET | Refills: 3 | Status: SHIPPED | OUTPATIENT
Start: 2020-09-30 | End: 2021-08-30

## 2020-09-30 NOTE — TELEPHONE ENCOUNTER

## 2020-11-16 ENCOUNTER — DOCUMENTATION ONLY (OUTPATIENT)
Dept: FAMILY MEDICINE | Facility: CLINIC | Age: 50
End: 2020-11-16

## 2020-11-16 DIAGNOSIS — E11.9 TYPE 2 DIABETES MELLITUS WITHOUT COMPLICATION, WITHOUT LONG-TERM CURRENT USE OF INSULIN (H): ICD-10-CM

## 2020-12-13 ENCOUNTER — HEALTH MAINTENANCE LETTER (OUTPATIENT)
Age: 50
End: 2020-12-13

## 2021-02-21 ENCOUNTER — HEALTH MAINTENANCE LETTER (OUTPATIENT)
Age: 51
End: 2021-02-21

## 2021-04-17 ENCOUNTER — HEALTH MAINTENANCE LETTER (OUTPATIENT)
Age: 51
End: 2021-04-17

## 2021-08-07 ENCOUNTER — HEALTH MAINTENANCE LETTER (OUTPATIENT)
Age: 51
End: 2021-08-07

## 2021-08-30 DIAGNOSIS — K59.00 CONSTIPATION, UNSPECIFIED CONSTIPATION TYPE: ICD-10-CM

## 2021-08-30 RX ORDER — CALCIUM POLYCARBOPHIL 625 MG 625 MG/1
2 TABLET ORAL DAILY
Qty: 180 TABLET | Refills: 1 | Status: SHIPPED | OUTPATIENT
Start: 2021-08-30 | End: 2021-09-19

## 2021-08-30 NOTE — TELEPHONE ENCOUNTER

## 2021-10-02 ENCOUNTER — HEALTH MAINTENANCE LETTER (OUTPATIENT)
Age: 51
End: 2021-10-02

## 2022-01-16 ENCOUNTER — HEALTH MAINTENANCE LETTER (OUTPATIENT)
Age: 52
End: 2022-01-16

## 2022-03-13 ENCOUNTER — HEALTH MAINTENANCE LETTER (OUTPATIENT)
Age: 52
End: 2022-03-13

## 2022-10-05 ENCOUNTER — LAB REQUISITION (OUTPATIENT)
Dept: LAB | Facility: CLINIC | Age: 52
End: 2022-10-05
Payer: COMMERCIAL

## 2022-10-05 DIAGNOSIS — D48.5 NEOPLASM OF UNCERTAIN BEHAVIOR OF SKIN: ICD-10-CM

## 2022-10-05 PROCEDURE — 88305 TISSUE EXAM BY PATHOLOGIST: CPT | Mod: TC,ORL | Performed by: OPHTHALMOLOGY

## 2022-10-05 PROCEDURE — 88305 TISSUE EXAM BY PATHOLOGIST: CPT | Mod: 26 | Performed by: PATHOLOGY

## 2022-10-06 LAB
PATH REPORT.COMMENTS IMP SPEC: NORMAL
PATH REPORT.COMMENTS IMP SPEC: NORMAL
PATH REPORT.FINAL DX SPEC: NORMAL
PATH REPORT.GROSS SPEC: NORMAL
PATH REPORT.MICROSCOPIC SPEC OTHER STN: NORMAL
PATH REPORT.RELEVANT HX SPEC: NORMAL
PHOTO IMAGE: NORMAL

## 2023-01-14 ENCOUNTER — HEALTH MAINTENANCE LETTER (OUTPATIENT)
Age: 53
End: 2023-01-14

## 2023-04-23 ENCOUNTER — HEALTH MAINTENANCE LETTER (OUTPATIENT)
Age: 53
End: 2023-04-23

## 2023-09-24 ENCOUNTER — HEALTH MAINTENANCE LETTER (OUTPATIENT)
Age: 53
End: 2023-09-24

## 2024-04-21 ENCOUNTER — HEALTH MAINTENANCE LETTER (OUTPATIENT)
Age: 54
End: 2024-04-21